# Patient Record
Sex: FEMALE | Race: WHITE | NOT HISPANIC OR LATINO | URBAN - METROPOLITAN AREA
[De-identification: names, ages, dates, MRNs, and addresses within clinical notes are randomized per-mention and may not be internally consistent; named-entity substitution may affect disease eponyms.]

---

## 2024-06-07 ENCOUNTER — HOSPITAL ENCOUNTER (INPATIENT)
Facility: HOSPITAL | Age: 59
LOS: 3 days | Discharge: HOME | DRG: 661 | End: 2024-06-10
Attending: STUDENT IN AN ORGANIZED HEALTH CARE EDUCATION/TRAINING PROGRAM | Admitting: INTERNAL MEDICINE
Payer: MEDICAID

## 2024-06-07 ENCOUNTER — ANESTHESIA (OUTPATIENT)
Dept: SURGERY | Facility: HOSPITAL | Age: 59
End: 2024-06-07
Payer: MEDICAID

## 2024-06-07 ENCOUNTER — ANESTHESIA EVENT (OUTPATIENT)
Dept: SURGERY | Facility: HOSPITAL | Age: 59
End: 2024-06-07
Payer: MEDICAID

## 2024-06-07 DIAGNOSIS — N20.1 LEFT URETERAL STONE: ICD-10-CM

## 2024-06-07 DIAGNOSIS — T83.511A URINARY TRACT INFECTION ASSOCIATED WITH CATHETERIZATION OF URINARY TRACT, UNSPECIFIED INDWELLING URINARY CATHETER TYPE, INITIAL ENCOUNTER: Primary | ICD-10-CM

## 2024-06-07 DIAGNOSIS — N39.0 URINARY TRACT INFECTION ASSOCIATED WITH CATHETERIZATION OF URINARY TRACT, UNSPECIFIED INDWELLING URINARY CATHETER TYPE, INITIAL ENCOUNTER: Primary | ICD-10-CM

## 2024-06-07 DIAGNOSIS — N20.1 CALCULUS OF URETER: ICD-10-CM

## 2024-06-07 DIAGNOSIS — N20.1 LEFT URETERAL STONE: Primary | ICD-10-CM

## 2024-06-07 DIAGNOSIS — R07.9 CHEST PAIN: ICD-10-CM

## 2024-06-07 PROBLEM — N13.30 HYDRONEPHROSIS: Status: ACTIVE | Noted: 2024-06-07

## 2024-06-07 PROBLEM — N20.0 NEPHROLITHIASIS: Status: ACTIVE | Noted: 2024-06-07

## 2024-06-07 PROBLEM — E11.9 TYPE 2 DIABETES MELLITUS, WITHOUT LONG-TERM CURRENT USE OF INSULIN: Status: ACTIVE | Noted: 2024-06-07

## 2024-06-07 PROBLEM — N20.0 NEPHROLITHIASIS: Chronic | Status: ACTIVE | Noted: 2024-06-07

## 2024-06-07 PROBLEM — D35.00 ADRENAL ADENOMA: Status: ACTIVE | Noted: 2024-06-07

## 2024-06-07 LAB
ALBUMIN SERPL BCP-MCNC: 3.3 G/DL (ref 3.5–5.2)
ALP SERPL-CCNC: 96 U/L (ref 55–135)
ALT SERPL W/O P-5'-P-CCNC: 12 U/L (ref 10–44)
ANION GAP SERPL CALC-SCNC: 14 MMOL/L (ref 8–16)
AST SERPL-CCNC: 17 U/L (ref 10–40)
BACTERIA #/AREA URNS HPF: ABNORMAL /HPF
BACTERIA #/AREA URNS HPF: ABNORMAL /HPF
BASOPHILS # BLD AUTO: 0.06 K/UL (ref 0–0.2)
BASOPHILS NFR BLD: 0.3 % (ref 0–1.9)
BILIRUB SERPL-MCNC: 0.3 MG/DL (ref 0.1–1)
BILIRUB UR QL STRIP: NEGATIVE
BILIRUB UR QL STRIP: NEGATIVE
BUN SERPL-MCNC: 6 MG/DL (ref 6–20)
CALCIUM SERPL-MCNC: 9.3 MG/DL (ref 8.7–10.5)
CHLORIDE SERPL-SCNC: 107 MMOL/L (ref 95–110)
CLARITY UR: ABNORMAL
CLARITY UR: ABNORMAL
CO2 SERPL-SCNC: 19 MMOL/L (ref 23–29)
COLOR UR: YELLOW
COLOR UR: YELLOW
CREAT SERPL-MCNC: 1 MG/DL (ref 0.5–1.4)
DIFFERENTIAL METHOD BLD: ABNORMAL
EOSINOPHIL # BLD AUTO: 0.1 K/UL (ref 0–0.5)
EOSINOPHIL NFR BLD: 0.3 % (ref 0–8)
ERYTHROCYTE [DISTWIDTH] IN BLOOD BY AUTOMATED COUNT: 14.9 % (ref 11.5–14.5)
EST. GFR  (NO RACE VARIABLE): >60 ML/MIN/1.73 M^2
GLUCOSE SERPL-MCNC: 157 MG/DL (ref 70–110)
GLUCOSE UR QL STRIP: ABNORMAL
GLUCOSE UR QL STRIP: NEGATIVE
HCT VFR BLD AUTO: 39.5 % (ref 37–48.5)
HGB BLD-MCNC: 13.5 G/DL (ref 12–16)
HGB UR QL STRIP: ABNORMAL
HGB UR QL STRIP: ABNORMAL
HYALINE CASTS #/AREA URNS LPF: 0 /LPF
HYALINE CASTS #/AREA URNS LPF: 0 /LPF
IMM GRANULOCYTES # BLD AUTO: 0.06 K/UL (ref 0–0.04)
IMM GRANULOCYTES NFR BLD AUTO: 0.3 % (ref 0–0.5)
KETONES UR QL STRIP: ABNORMAL
KETONES UR QL STRIP: NEGATIVE
LEUKOCYTE ESTERASE UR QL STRIP: ABNORMAL
LEUKOCYTE ESTERASE UR QL STRIP: ABNORMAL
LIPASE SERPL-CCNC: 24 U/L (ref 4–60)
LYMPHOCYTES # BLD AUTO: 1.1 K/UL (ref 1–4.8)
LYMPHOCYTES NFR BLD: 6.2 % (ref 18–48)
MCH RBC QN AUTO: 28.7 PG (ref 27–31)
MCHC RBC AUTO-ENTMCNC: 34.2 G/DL (ref 32–36)
MCV RBC AUTO: 84 FL (ref 82–98)
MICROSCOPIC COMMENT: ABNORMAL
MICROSCOPIC COMMENT: ABNORMAL
MONOCYTES # BLD AUTO: 0.9 K/UL (ref 0.3–1)
MONOCYTES NFR BLD: 5 % (ref 4–15)
NEUTROPHILS # BLD AUTO: 15.4 K/UL (ref 1.8–7.7)
NEUTROPHILS NFR BLD: 87.9 % (ref 38–73)
NITRITE UR QL STRIP: POSITIVE
NITRITE UR QL STRIP: POSITIVE
NRBC BLD-RTO: 0 /100 WBC
PH UR STRIP: 6 [PH] (ref 5–8)
PH UR STRIP: 6 [PH] (ref 5–8)
PLATELET # BLD AUTO: 289 K/UL (ref 150–450)
PMV BLD AUTO: 10.2 FL (ref 9.2–12.9)
POCT GLUCOSE: 110 MG/DL (ref 70–110)
POCT GLUCOSE: 94 MG/DL (ref 70–110)
POTASSIUM SERPL-SCNC: 3.6 MMOL/L (ref 3.5–5.1)
PROT SERPL-MCNC: 7.3 G/DL (ref 6–8.4)
PROT UR QL STRIP: ABNORMAL
PROT UR QL STRIP: ABNORMAL
RBC # BLD AUTO: 4.71 M/UL (ref 4–5.4)
RBC #/AREA URNS HPF: 30 /HPF (ref 0–4)
RBC #/AREA URNS HPF: 43 /HPF (ref 0–4)
SODIUM SERPL-SCNC: 140 MMOL/L (ref 136–145)
SP GR UR STRIP: 1.02 (ref 1–1.03)
SP GR UR STRIP: 1.02 (ref 1–1.03)
SQUAMOUS #/AREA URNS HPF: 10 /HPF
UNIDENT CRYS URNS QL MICRO: 3
URN SPEC COLLECT METH UR: ABNORMAL
URN SPEC COLLECT METH UR: ABNORMAL
UROBILINOGEN UR STRIP-ACNC: NEGATIVE EU/DL
UROBILINOGEN UR STRIP-ACNC: NEGATIVE EU/DL
WBC # BLD AUTO: 17.5 K/UL (ref 3.9–12.7)
WBC #/AREA URNS HPF: >100 /HPF (ref 0–5)
WBC #/AREA URNS HPF: >100 /HPF (ref 0–5)
WBC CLUMPS URNS QL MICRO: ABNORMAL
WBC CLUMPS URNS QL MICRO: ABNORMAL

## 2024-06-07 PROCEDURE — 25500020 PHARM REV CODE 255: Performed by: UROLOGY

## 2024-06-07 PROCEDURE — 80053 COMPREHEN METABOLIC PANEL: CPT | Performed by: STUDENT IN AN ORGANIZED HEALTH CARE EDUCATION/TRAINING PROGRAM

## 2024-06-07 PROCEDURE — 85025 COMPLETE CBC W/AUTO DIFF WBC: CPT | Performed by: STUDENT IN AN ORGANIZED HEALTH CARE EDUCATION/TRAINING PROGRAM

## 2024-06-07 PROCEDURE — 94761 N-INVAS EAR/PLS OXIMETRY MLT: CPT

## 2024-06-07 PROCEDURE — 37000009 HC ANESTHESIA EA ADD 15 MINS: Performed by: UROLOGY

## 2024-06-07 PROCEDURE — 71000039 HC RECOVERY, EACH ADD'L HOUR: Performed by: UROLOGY

## 2024-06-07 PROCEDURE — 25000003 PHARM REV CODE 250: Performed by: INTERNAL MEDICINE

## 2024-06-07 PROCEDURE — 71000033 HC RECOVERY, INTIAL HOUR: Performed by: UROLOGY

## 2024-06-07 PROCEDURE — 36000706: Performed by: UROLOGY

## 2024-06-07 PROCEDURE — 96365 THER/PROPH/DIAG IV INF INIT: CPT

## 2024-06-07 PROCEDURE — 25000003 PHARM REV CODE 250: Performed by: UROLOGY

## 2024-06-07 PROCEDURE — C2617 STENT, NON-COR, TEM W/O DEL: HCPCS | Performed by: UROLOGY

## 2024-06-07 PROCEDURE — C1769 GUIDE WIRE: HCPCS | Performed by: UROLOGY

## 2024-06-07 PROCEDURE — 25000003 PHARM REV CODE 250: Performed by: STUDENT IN AN ORGANIZED HEALTH CARE EDUCATION/TRAINING PROGRAM

## 2024-06-07 PROCEDURE — 63600175 PHARM REV CODE 636 W HCPCS: Performed by: STUDENT IN AN ORGANIZED HEALTH CARE EDUCATION/TRAINING PROGRAM

## 2024-06-07 PROCEDURE — 81000 URINALYSIS NONAUTO W/SCOPE: CPT | Performed by: STUDENT IN AN ORGANIZED HEALTH CARE EDUCATION/TRAINING PROGRAM

## 2024-06-07 PROCEDURE — 0T778DZ DILATION OF LEFT URETER WITH INTRALUMINAL DEVICE, VIA NATURAL OR ARTIFICIAL OPENING ENDOSCOPIC: ICD-10-PCS | Performed by: UROLOGY

## 2024-06-07 PROCEDURE — 52332 CYSTOSCOPY AND TREATMENT: CPT | Mod: LT,,, | Performed by: UROLOGY

## 2024-06-07 PROCEDURE — C1758 CATHETER, URETERAL: HCPCS | Performed by: UROLOGY

## 2024-06-07 PROCEDURE — 87184 SC STD DISK METHOD PER PLATE: CPT | Performed by: STUDENT IN AN ORGANIZED HEALTH CARE EDUCATION/TRAINING PROGRAM

## 2024-06-07 PROCEDURE — 36000707: Performed by: UROLOGY

## 2024-06-07 PROCEDURE — 83690 ASSAY OF LIPASE: CPT | Performed by: STUDENT IN AN ORGANIZED HEALTH CARE EDUCATION/TRAINING PROGRAM

## 2024-06-07 PROCEDURE — 87186 SC STD MICRODIL/AGAR DIL: CPT | Performed by: STUDENT IN AN ORGANIZED HEALTH CARE EDUCATION/TRAINING PROGRAM

## 2024-06-07 PROCEDURE — 87086 URINE CULTURE/COLONY COUNT: CPT | Mod: 59 | Performed by: UROLOGY

## 2024-06-07 PROCEDURE — 37000008 HC ANESTHESIA 1ST 15 MINUTES: Performed by: UROLOGY

## 2024-06-07 PROCEDURE — 87077 CULTURE AEROBIC IDENTIFY: CPT | Performed by: STUDENT IN AN ORGANIZED HEALTH CARE EDUCATION/TRAINING PROGRAM

## 2024-06-07 PROCEDURE — 81000 URINALYSIS NONAUTO W/SCOPE: CPT | Mod: 91 | Performed by: UROLOGY

## 2024-06-07 PROCEDURE — 63600175 PHARM REV CODE 636 W HCPCS: Performed by: UROLOGY

## 2024-06-07 PROCEDURE — 11000001 HC ACUTE MED/SURG PRIVATE ROOM

## 2024-06-07 PROCEDURE — 87088 URINE BACTERIA CULTURE: CPT | Performed by: STUDENT IN AN ORGANIZED HEALTH CARE EDUCATION/TRAINING PROGRAM

## 2024-06-07 PROCEDURE — BT1F1ZZ FLUOROSCOPY OF LEFT KIDNEY, URETER AND BLADDER USING LOW OSMOLAR CONTRAST: ICD-10-PCS | Performed by: UROLOGY

## 2024-06-07 PROCEDURE — 63600175 PHARM REV CODE 636 W HCPCS: Performed by: ANESTHESIOLOGY

## 2024-06-07 PROCEDURE — 99291 CRITICAL CARE FIRST HOUR: CPT

## 2024-06-07 PROCEDURE — 87086 URINE CULTURE/COLONY COUNT: CPT | Performed by: STUDENT IN AN ORGANIZED HEALTH CARE EDUCATION/TRAINING PROGRAM

## 2024-06-07 PROCEDURE — 25000003 PHARM REV CODE 250: Performed by: ANESTHESIOLOGY

## 2024-06-07 PROCEDURE — 99222 1ST HOSP IP/OBS MODERATE 55: CPT | Mod: 25,,, | Performed by: UROLOGY

## 2024-06-07 PROCEDURE — 96375 TX/PRO/DX INJ NEW DRUG ADDON: CPT

## 2024-06-07 PROCEDURE — 74420 UROGRAPHY RTRGR +-KUB: CPT | Mod: 26,,, | Performed by: UROLOGY

## 2024-06-07 DEVICE — STENT URETERAL UNIV 6FR 26CM: Type: IMPLANTABLE DEVICE | Site: URETER | Status: FUNCTIONAL

## 2024-06-07 RX ORDER — GLUCAGON 1 MG
1 KIT INJECTION
Status: DISCONTINUED | OUTPATIENT
Start: 2024-06-07 | End: 2024-06-10 | Stop reason: HOSPADM

## 2024-06-07 RX ORDER — SODIUM CHLORIDE 0.9 % (FLUSH) 0.9 %
10 SYRINGE (ML) INJECTION EVERY 12 HOURS PRN
Status: DISCONTINUED | OUTPATIENT
Start: 2024-06-07 | End: 2024-06-10 | Stop reason: HOSPADM

## 2024-06-07 RX ORDER — ALUMINUM HYDROXIDE, MAGNESIUM HYDROXIDE, AND SIMETHICONE 1200; 120; 1200 MG/30ML; MG/30ML; MG/30ML
30 SUSPENSION ORAL
Status: DISCONTINUED | OUTPATIENT
Start: 2024-06-07 | End: 2024-06-10 | Stop reason: HOSPADM

## 2024-06-07 RX ORDER — MIDAZOLAM HYDROCHLORIDE 1 MG/ML
INJECTION INTRAMUSCULAR; INTRAVENOUS
Status: DISCONTINUED | OUTPATIENT
Start: 2024-06-07 | End: 2024-06-07

## 2024-06-07 RX ORDER — ACETAMINOPHEN 325 MG/1
650 TABLET ORAL EVERY 8 HOURS PRN
Status: DISCONTINUED | OUTPATIENT
Start: 2024-06-07 | End: 2024-06-10 | Stop reason: HOSPADM

## 2024-06-07 RX ORDER — IBUPROFEN 200 MG
16 TABLET ORAL
Status: DISCONTINUED | OUTPATIENT
Start: 2024-06-07 | End: 2024-06-10 | Stop reason: HOSPADM

## 2024-06-07 RX ORDER — MORPHINE SULFATE 4 MG/ML
4 INJECTION, SOLUTION INTRAMUSCULAR; INTRAVENOUS
Status: COMPLETED | OUTPATIENT
Start: 2024-06-07 | End: 2024-06-07

## 2024-06-07 RX ORDER — ONDANSETRON HYDROCHLORIDE 2 MG/ML
4 INJECTION, SOLUTION INTRAVENOUS DAILY PRN
Status: DISCONTINUED | OUTPATIENT
Start: 2024-06-07 | End: 2024-06-07 | Stop reason: HOSPADM

## 2024-06-07 RX ORDER — OXYBUTYNIN CHLORIDE 5 MG/1
5 TABLET ORAL 3 TIMES DAILY PRN
Status: DISCONTINUED | OUTPATIENT
Start: 2024-06-07 | End: 2024-06-07 | Stop reason: HOSPADM

## 2024-06-07 RX ORDER — NALOXONE HCL 0.4 MG/ML
0.02 VIAL (ML) INJECTION
Status: DISCONTINUED | OUTPATIENT
Start: 2024-06-07 | End: 2024-06-10 | Stop reason: HOSPADM

## 2024-06-07 RX ORDER — OXYBUTYNIN CHLORIDE 5 MG/1
5 TABLET ORAL 3 TIMES DAILY PRN
Status: DISCONTINUED | OUTPATIENT
Start: 2024-06-07 | End: 2024-06-07

## 2024-06-07 RX ORDER — PHENAZOPYRIDINE HYDROCHLORIDE 100 MG/1
100 TABLET, FILM COATED ORAL 3 TIMES DAILY PRN
Status: DISCONTINUED | OUTPATIENT
Start: 2024-06-07 | End: 2024-06-07

## 2024-06-07 RX ORDER — IBUPROFEN 200 MG
24 TABLET ORAL
Status: DISCONTINUED | OUTPATIENT
Start: 2024-06-07 | End: 2024-06-10 | Stop reason: HOSPADM

## 2024-06-07 RX ORDER — ONDANSETRON HYDROCHLORIDE 2 MG/ML
4 INJECTION, SOLUTION INTRAVENOUS
Status: COMPLETED | OUTPATIENT
Start: 2024-06-07 | End: 2024-06-07

## 2024-06-07 RX ORDER — PROPOFOL 10 MG/ML
VIAL (ML) INTRAVENOUS CONTINUOUS PRN
Status: DISCONTINUED | OUTPATIENT
Start: 2024-06-07 | End: 2024-06-07

## 2024-06-07 RX ORDER — SUCRALFATE 1 G/10ML
1 SUSPENSION ORAL EVERY 6 HOURS
Status: DISCONTINUED | OUTPATIENT
Start: 2024-06-07 | End: 2024-06-10 | Stop reason: HOSPADM

## 2024-06-07 RX ORDER — PHENAZOPYRIDINE HYDROCHLORIDE 100 MG/1
100 TABLET, FILM COATED ORAL 3 TIMES DAILY PRN
Status: DISCONTINUED | OUTPATIENT
Start: 2024-06-07 | End: 2024-06-07 | Stop reason: HOSPADM

## 2024-06-07 RX ORDER — ACETAMINOPHEN 325 MG/1
650 TABLET ORAL EVERY 4 HOURS PRN
Status: DISCONTINUED | OUTPATIENT
Start: 2024-06-07 | End: 2024-06-10 | Stop reason: HOSPADM

## 2024-06-07 RX ORDER — PROPOFOL 10 MG/ML
VIAL (ML) INTRAVENOUS
Status: DISCONTINUED | OUTPATIENT
Start: 2024-06-07 | End: 2024-06-07

## 2024-06-07 RX ORDER — ONDANSETRON HYDROCHLORIDE 2 MG/ML
4 INJECTION, SOLUTION INTRAVENOUS EVERY 6 HOURS PRN
Status: DISCONTINUED | OUTPATIENT
Start: 2024-06-07 | End: 2024-06-10 | Stop reason: HOSPADM

## 2024-06-07 RX ORDER — OXYCODONE HYDROCHLORIDE 5 MG/1
5 TABLET ORAL
Status: DISCONTINUED | OUTPATIENT
Start: 2024-06-07 | End: 2024-06-07 | Stop reason: HOSPADM

## 2024-06-07 RX ORDER — HYDROMORPHONE HYDROCHLORIDE 2 MG/ML
0.5 INJECTION, SOLUTION INTRAMUSCULAR; INTRAVENOUS; SUBCUTANEOUS EVERY 5 MIN PRN
Status: COMPLETED | OUTPATIENT
Start: 2024-06-07 | End: 2024-06-07

## 2024-06-07 RX ORDER — HYDROCODONE BITARTRATE AND ACETAMINOPHEN 5; 325 MG/1; MG/1
1 TABLET ORAL EVERY 6 HOURS PRN
Status: DISCONTINUED | OUTPATIENT
Start: 2024-06-07 | End: 2024-06-10 | Stop reason: HOSPADM

## 2024-06-07 RX ORDER — TALC
6 POWDER (GRAM) TOPICAL NIGHTLY PRN
Status: DISCONTINUED | OUTPATIENT
Start: 2024-06-07 | End: 2024-06-10 | Stop reason: HOSPADM

## 2024-06-07 RX ORDER — KETOROLAC TROMETHAMINE 30 MG/ML
15 INJECTION, SOLUTION INTRAMUSCULAR; INTRAVENOUS
Status: COMPLETED | OUTPATIENT
Start: 2024-06-07 | End: 2024-06-07

## 2024-06-07 RX ADMIN — HYDROMORPHONE HYDROCHLORIDE 0.5 MG: 2 INJECTION, SOLUTION INTRAMUSCULAR; INTRAVENOUS; SUBCUTANEOUS at 08:06

## 2024-06-07 RX ADMIN — MIDAZOLAM 2 MG: 1 INJECTION INTRAMUSCULAR; INTRAVENOUS at 07:06

## 2024-06-07 RX ADMIN — KETOROLAC TROMETHAMINE 15 MG: 30 INJECTION, SOLUTION INTRAMUSCULAR; INTRAVENOUS at 02:06

## 2024-06-07 RX ADMIN — OXYCODONE 5 MG: 5 TABLET ORAL at 08:06

## 2024-06-07 RX ADMIN — PROPOFOL 125 MCG/KG/MIN: 10 INJECTION, EMULSION INTRAVENOUS at 07:06

## 2024-06-07 RX ADMIN — ONDANSETRON 4 MG: 2 INJECTION INTRAMUSCULAR; INTRAVENOUS at 01:06

## 2024-06-07 RX ADMIN — PROPOFOL 30 MG: 10 INJECTION, EMULSION INTRAVENOUS at 07:06

## 2024-06-07 RX ADMIN — PHENAZOPYRIDINE HYDROCHLORIDE 100 MG: 100 TABLET ORAL at 08:06

## 2024-06-07 RX ADMIN — SUCRALFATE 1 G: 1 SUSPENSION ORAL at 11:06

## 2024-06-07 RX ADMIN — ALUMINUM HYDROXIDE, MAGNESIUM HYDROXIDE, AND SIMETHICONE 30 ML: 1200; 120; 1200 SUSPENSION ORAL at 11:06

## 2024-06-07 RX ADMIN — MORPHINE SULFATE 4 MG: 4 INJECTION INTRAVENOUS at 01:06

## 2024-06-07 RX ADMIN — OXYBUTYNIN CHLORIDE 5 MG: 5 TABLET ORAL at 08:06

## 2024-06-07 RX ADMIN — CEFTRIAXONE SODIUM 1 G: 1 INJECTION, POWDER, FOR SOLUTION INTRAMUSCULAR; INTRAVENOUS at 08:06

## 2024-06-07 RX ADMIN — ACETAMINOPHEN 650 MG: 325 TABLET ORAL at 07:06

## 2024-06-07 RX ADMIN — HYDROCODONE BITARTRATE AND ACETAMINOPHEN 1 TABLET: 5; 325 TABLET ORAL at 11:06

## 2024-06-07 RX ADMIN — HYDROCODONE BITARTRATE AND ACETAMINOPHEN 1 TABLET: 5; 325 TABLET ORAL at 04:06

## 2024-06-07 RX ADMIN — SUCRALFATE 1 G: 1 SUSPENSION ORAL at 05:06

## 2024-06-07 RX ADMIN — HYDROCODONE BITARTRATE AND ACETAMINOPHEN 1 TABLET: 5; 325 TABLET ORAL at 06:06

## 2024-06-07 RX ADMIN — CEFTRIAXONE SODIUM 1 G: 1 INJECTION, POWDER, FOR SOLUTION INTRAMUSCULAR; INTRAVENOUS at 03:06

## 2024-06-07 RX ADMIN — ALUMINUM HYDROXIDE, MAGNESIUM HYDROXIDE, AND SIMETHICONE 30 ML: 1200; 120; 1200 SUSPENSION ORAL at 08:06

## 2024-06-07 RX ADMIN — ALUMINUM HYDROXIDE, MAGNESIUM HYDROXIDE, AND SIMETHICONE 30 ML: 1200; 120; 1200 SUSPENSION ORAL at 04:06

## 2024-06-07 NOTE — SUBJECTIVE & OBJECTIVE
No past medical history on file.    No past surgical history on file.    Review of patient's allergies indicates:   Allergen Reactions    Sulfa (sulfonamide antibiotics)        No current facility-administered medications on file prior to encounter.     No current outpatient medications on file prior to encounter.     Family History    None       Tobacco Use    Smoking status: Not on file    Smokeless tobacco: Not on file   Substance and Sexual Activity    Alcohol use: Not on file    Drug use: Not on file    Sexual activity: Not on file     Review of Systems   Constitutional:  Negative for appetite change and chills.   HENT:  Negative for ear discharge and ear pain.    Respiratory:  Negative for cough and choking.    Cardiovascular:  Negative for chest pain and leg swelling.   Gastrointestinal:  Positive for abdominal pain. Negative for anal bleeding and blood in stool.   Endocrine: Negative for polydipsia and polyphagia.   Genitourinary:  Positive for flank pain. Negative for hematuria.   Musculoskeletal:  Negative for back pain and gait problem.   Skin:  Negative for pallor and rash.   Allergic/Immunologic: Negative for food allergies and immunocompromised state.   Neurological:  Negative for seizures and speech difficulty.   Hematological:  Negative for adenopathy. Does not bruise/bleed easily.   Psychiatric/Behavioral:  Negative for decreased concentration and dysphoric mood.      Objective:     Vital Signs (Most Recent):  Temp: 98.8 °F (37.1 °C) (06/07/24 0159)  Pulse: 81 (06/07/24 0220)  Resp: 16 (06/07/24 0136)  BP: 139/78 (06/07/24 0204)  SpO2: 96 % (06/07/24 0220) Vital Signs (24h Range):  Temp:  [98.8 °F (37.1 °C)-99.4 °F (37.4 °C)] 98.8 °F (37.1 °C)  Pulse:  [78-86] 81  Resp:  [16-18] 16  SpO2:  [95 %-98 %] 96 %  BP: (136-165)/(78-98) 139/78     Weight: 90.7 kg (200 lb)  Body mass index is 29.53 kg/m².     Physical Exam  Vitals reviewed.   Constitutional:       General: She is not in acute distress.      "Appearance: She is not toxic-appearing.   HENT:      Right Ear: There is no impacted cerumen.      Left Ear: There is no impacted cerumen.      Nose: No congestion or rhinorrhea.      Mouth/Throat:      Pharynx: No oropharyngeal exudate or posterior oropharyngeal erythema.   Eyes:      General:         Right eye: No discharge.         Left eye: No discharge.   Cardiovascular:      Rate and Rhythm: Normal rate.      Heart sounds: No murmur heard.     No gallop.   Pulmonary:      Breath sounds: No wheezing or rales.   Abdominal:      General: There is no distension.      Tenderness: There is abdominal tenderness. There is left CVA tenderness.   Musculoskeletal:         General: No swelling or deformity.      Cervical back: No rigidity.   Lymphadenopathy:      Cervical: No cervical adenopathy.   Skin:     Coloration: Skin is not jaundiced.      Findings: No bruising.   Neurological:      General: No focal deficit present.      Cranial Nerves: No cranial nerve deficit.      Motor: No weakness.   Psychiatric:         Mood and Affect: Mood normal.                Significant Labs: All pertinent labs within the past 24 hours have been reviewed.  Blood Culture: No results for input(s): "LABBLOO" in the last 48 hours.  BMP:   Recent Labs   Lab 06/07/24  0137   *      K 3.6      CO2 19*   BUN 6   CREATININE 1.0   CALCIUM 9.3     CBC:   Recent Labs   Lab 06/07/24  0137   WBC 17.50*   HGB 13.5   HCT 39.5        CMP:   Recent Labs   Lab 06/07/24 0137      K 3.6      CO2 19*   *   BUN 6   CREATININE 1.0   CALCIUM 9.3   PROT 7.3   ALBUMIN 3.3*   BILITOT 0.3   ALKPHOS 96   AST 17   ALT 12   ANIONGAP 14     Troponin: No results for input(s): "TROPONINI", "TROPONINIHS" in the last 48 hours.  Urine Culture: No results for input(s): "LABURIN" in the last 48 hours.    Significant Imaging: I have reviewed all pertinent imaging results/findings within the past 24 hours.  I have reviewed and " interpreted all pertinent imaging results/findings within the past 24 hours.

## 2024-06-07 NOTE — ASSESSMENT & PLAN NOTE
CT abdominal pelvis significant for obstructing renal stone on left side, bilateral nonobstructing stones    Plan:  Consult Urology for possible lithotripsy

## 2024-06-07 NOTE — HPI
Josselyn Tarango is a 58-year-old female with a past medical history of stomach ulcer, diabetes who presents with abdominal pain diarrhea.    Patient states that over the past few days she has had several loose nonbloody non mucous bowel movements as well as left lower abdominal pain.  Due to worsening pain she decided to call the ambulance, where she noticed that the lower abdominal pain also radiated to the back.    Triage vitals were remarkable for elevated blood pressures.  Review of systems significant for abdominal pain and diarrhea.  Physical exam significant for some tenderness to palpation in the lower left abdominal and flank pain area.    CBC significant for leukocytosis.  CMP significant for elevated sugars.    UA significant for UTI, with several WBCs, nitrates, leukocyte esterase.  Urine culture pending.    CT abdomen and pelvis was concerning for obstructing calculus in the distal left ureter with moderate hydronephrosis, bilateral nonobstructing calculi, left adrenal adenoma, diverticulosis.    Patient was given dose of IV Rocephin.    Patient admitted for UTI, obstructing nephrolithiasis, and hydronephrosis management.  Urology consultation in the a.m.

## 2024-06-07 NOTE — ED NOTES
PT AMB TO BATHROOM AND BACK WITHOUT COMPLICATION.  VSS PER MONITOR.  NO NEEDS VOICED, COVERED WITH WARM BLANKET X2, CALL LIGHT IN REACH.  WILL CONT TO MONITOR

## 2024-06-07 NOTE — PLAN OF CARE
Pacu discharge criteria met. Report called to Migel Veronica/5A. Transported to room 522 via stretcher,sr upx2 in care of transport staff.

## 2024-06-07 NOTE — ED NOTES
Pt c/o left side/low abd pain.  Medicated with hydrocodone.  Will monitor for effect. Call light in hand

## 2024-06-07 NOTE — H&P
[facility]  History & Physical    Patient Name: Josselyn Tarango  MRN: [id number]  Patient Class: OP- Observation  Admission Date: 6/7/2024  Attending Physician: Edison Echevarria MD   Primary Care Provider: No primary care provider on file.         Patient information was obtained from patient and ER records.     Subjective:     Principal Problem:UTI (urinary tract infection)    Chief Complaint:   Chief Complaint   Patient presents with    Abdominal Pain     Brought to ED from home for abd pain and diarrhea x 2 days. Pt also c/o of trouble urinating but states that this is chronic and unchanged since 2yrs of age.         HPI: Josselyn Tarango is a 58-year-old female with a past medical history of stomach ulcer, diabetes who presents with abdominal pain diarrhea.    Patient states that over the past few days she has had several loose nonbloody non mucous bowel movements as well as left lower abdominal pain.  Due to worsening pain she decided to call the ambulance, where she noticed that the lower abdominal pain also radiated to the back.    Triage vitals were remarkable for elevated blood pressures.  Review of systems significant for abdominal pain and diarrhea.  Physical exam significant for some tenderness to palpation in the lower left abdominal and flank pain area.    CBC significant for leukocytosis.  CMP significant for elevated sugars.    UA significant for UTI, with several WBCs, nitrates, leukocyte esterase.  Urine culture pending.    CT abdomen and pelvis was concerning for obstructing calculus in the distal left ureter with moderate hydronephrosis, bilateral nonobstructing calculi, left adrenal adenoma, diverticulosis.    Patient was given dose of IV Rocephin.    Patient admitted for UTI, obstructing nephrolithiasis, and hydronephrosis management.  Urology consultation in the a.m.    No past medical history on file.    No past surgical history on file.    Review of  patient's allergies indicates:   Allergen Reactions    Sulfa (sulfonamide antibiotics)        No current facility-administered medications on file prior to encounter.     No current outpatient medications on file prior to encounter.     Family History    None       Tobacco Use    Smoking status: Not on file    Smokeless tobacco: Not on file   Substance and Sexual Activity    Alcohol use: Not on file    Drug use: Not on file    Sexual activity: Not on file     Review of Systems   Constitutional:  Negative for appetite change and chills.   HENT:  Negative for ear discharge and ear pain.    Respiratory:  Negative for cough and choking.    Cardiovascular:  Negative for chest pain and leg swelling.   Gastrointestinal:  Positive for abdominal pain. Negative for anal bleeding and blood in stool.   Endocrine: Negative for polydipsia and polyphagia.   Genitourinary:  Positive for flank pain. Negative for hematuria.   Musculoskeletal:  Negative for back pain and gait problem.   Skin:  Negative for pallor and rash.   Allergic/Immunologic: Negative for food allergies and immunocompromised state.   Neurological:  Negative for seizures and speech difficulty.   Hematological:  Negative for adenopathy. Does not bruise/bleed easily.   Psychiatric/Behavioral:  Negative for decreased concentration and dysphoric mood.      Objective:     Vital Signs (Most Recent):  Temp: 98.8 °F (37.1 °C) (06/07/24 0159)  Pulse: 81 (06/07/24 0220)  Resp: 16 (06/07/24 0136)  BP: 139/78 (06/07/24 0204)  SpO2: 96 % (06/07/24 0220) Vital Signs (24h Range):  Temp:  [98.8 °F (37.1 °C)-99.4 °F (37.4 °C)] 98.8 °F (37.1 °C)  Pulse:  [78-86] 81  Resp:  [16-18] 16  SpO2:  [95 %-98 %] 96 %  BP: (136-165)/(78-98) 139/78     Weight: 90.7 kg (200 lb)  Body mass index is 29.53 kg/m².     Physical Exam  Vitals reviewed.   Constitutional:       General: She is not in acute distress.     Appearance: She is not toxic-appearing.   HENT:      Right Ear: There is no impacted  "cerumen.      Left Ear: There is no impacted cerumen.      Nose: No congestion or rhinorrhea.      Mouth/Throat:      Pharynx: No oropharyngeal exudate or posterior oropharyngeal erythema.   Eyes:      General:         Right eye: No discharge.         Left eye: No discharge.   Cardiovascular:      Rate and Rhythm: Normal rate.      Heart sounds: No murmur heard.     No gallop.   Pulmonary:      Breath sounds: No wheezing or rales.   Abdominal:      General: There is no distension.      Tenderness: There is abdominal tenderness. There is left CVA tenderness.   Musculoskeletal:         General: No swelling or deformity.      Cervical back: No rigidity.   Lymphadenopathy:      Cervical: No cervical adenopathy.   Skin:     Coloration: Skin is not jaundiced.      Findings: No bruising.   Neurological:      General: No focal deficit present.      Cranial Nerves: No cranial nerve deficit.      Motor: No weakness.   Psychiatric:         Mood and Affect: Mood normal.                Significant Labs: All pertinent labs within the past 24 hours have been reviewed.  Blood Culture: No results for input(s): "LABBLOO" in the last 48 hours.  BMP:   Recent Labs   Lab 06/07/24  0137   *      K 3.6      CO2 19*   BUN 6   CREATININE 1.0   CALCIUM 9.3     CBC:   Recent Labs   Lab 06/07/24 0137   WBC 17.50*   HGB 13.5   HCT 39.5        CMP:   Recent Labs   Lab 06/07/24 0137      K 3.6      CO2 19*   *   BUN 6   CREATININE 1.0   CALCIUM 9.3   PROT 7.3   ALBUMIN 3.3*   BILITOT 0.3   ALKPHOS 96   AST 17   ALT 12   ANIONGAP 14     Troponin: No results for input(s): "TROPONINI", "TROPONINIHS" in the last 48 hours.  Urine Culture: No results for input(s): "LABURIN" in the last 48 hours.    Significant Imaging: I have reviewed all pertinent imaging results/findings within the past 24 hours.  I have reviewed and interpreted all pertinent imaging results/findings within the past 24 " hours.  Assessment/Plan:     * UTI (urinary tract infection)  UA significant for UTI    Plan:  Urine culture pending  IV ceftriaxone daily for 3-5 day total course, transition to oral agents when sensitivities arrive      Adrenal adenoma  Incidental finding on CT scan  Likely benign    Plan:  Continue to monitor outpatient      Nephrolithiasis  CT abdominal pelvis significant for obstructing renal stone on left side, bilateral nonobstructing stones    Plan:  Consult Urology for possible lithotripsy    Type 2 diabetes mellitus, without long-term current use of insulin  Patient states that she was diagnosed with diabetes over the past year with a provider in [place]    Plan:  Order hemoglobin A1c  Consider sliding scale if blood glucose is greatly above 180      Hydronephrosis  Left-sided hydronephrosis noted    Plan:  Consult Urology for possible PCN due to obstructing stone        VTE Risk Mitigation (From admission, onward)           Ordered     IP VTE LOW RISK PATIENT  Once         06/07/24 0459     Place sequential compression device  Until discontinued         06/07/24 0459                       On 06/07/2024, patient should be placed in hospital observation services under my care.             Steven Encarnacion MD  Department of Hospital Medicine  [facility] - Emergency Dept

## 2024-06-07 NOTE — ED PROVIDER NOTES
"ED Provider Note - 6/7/2024    History     Chief Complaint   Patient presents with    Abdominal Pain     Brought to ED from home for abd pain and diarrhea x 2 days. Pt also c/o of trouble urinating but states that this is chronic and unchanged since 2yrs of age.        HPI     Josselyn Tarango is a 58 y.o. year old female with past medical and surgical history as seen below, presenting with chief complaint of left lower quadrant abdominal pain.  Associated with nausea, vomiting, diarrhea.  Started today.  States history of appendicitis with appendectomy several years ago.      No past medical history on file.  No past surgical history on file.      No family history on file.     Social Determinants of Health with Concerns     Tobacco Use: Not on file   Alcohol Use: Not on file   Financial Resource Strain: Not on file   Food Insecurity: Not on file   Transportation Needs: Not on file   Physical Activity: Not on file   Stress: Not on file   Housing Stability: Not on file   Depression: Not on file   Utilities: Not on file   Health Literacy: Not on file   Social Isolation: Not on file      Review of patient's allergies indicates:   Allergen Reactions    Sulfa (sulfonamide antibiotics)        Review of Systems     A full Review of Systems (ROS) was performed and was negative unless otherwise stated in the HPI.      Physical Exam     Vitals:    06/07/24 0034   BP: (!) 165/98   BP Location: Right arm   Patient Position: Sitting   Pulse: 78   Resp: 18   Temp: 99.4 °F (37.4 °C)   TempSrc: Oral   SpO2: 98%   Weight: 90.7 kg (200 lb)   Height: 5' 9" (1.753 m)        Physical Exam    Nursing note and vitals reviewed.  Constitutional: She appears well-developed and well-nourished. No distress.   HENT:   Head: Normocephalic and atraumatic.   Right Ear: External ear normal.   Left Ear: External ear normal.   Nose: Nose normal.   Mouth/Throat: Oropharynx is clear and moist.   Eyes: Conjunctivae and EOM are normal. Pupils are equal, " round, and reactive to light.   Neck: Neck supple.   Normal range of motion.  Cardiovascular:  Normal rate, regular rhythm, normal heart sounds and intact distal pulses.           Pulmonary/Chest: Breath sounds normal. No stridor. No respiratory distress. She has no wheezes. She has no rhonchi. She has no rales.   Abdominal: Abdomen is soft. Bowel sounds are normal. There is abdominal tenderness in the left lower quadrant. No hernia.   Musculoskeletal:         General: No tenderness or edema. Normal range of motion.      Cervical back: Normal range of motion and neck supple.     Neurological: She is alert and oriented to person, place, and time. She has normal strength. No cranial nerve deficit or sensory deficit.   Skin: Skin is warm and dry. No rash noted.   Psychiatric: She has a normal mood and affect. Thought content normal.         Lab Results- Independently reviewed by myself      Labs Reviewed   CBC W/ AUTO DIFFERENTIAL - Abnormal; Notable for the following components:       Result Value    WBC 17.50 (*)     RDW 14.9 (*)     Gran # (ANC) 15.4 (*)     Immature Grans (Abs) 0.06 (*)     Gran % 87.9 (*)     Lymph % 6.2 (*)     All other components within normal limits   COMPREHENSIVE METABOLIC PANEL - Abnormal; Notable for the following components:    CO2 19 (*)     Glucose 157 (*)     Albumin 3.3 (*)     All other components within normal limits   URINALYSIS, REFLEX TO URINE CULTURE - Abnormal; Notable for the following components:    Appearance, UA Cloudy (*)     Protein, UA 2+ (*)     Ketones, UA Trace (*)     Occult Blood UA 2+ (*)     Nitrite, UA Positive (*)     Leukocytes, UA 3+ (*)     All other components within normal limits    Narrative:     Specimen Source->Urine   URINALYSIS MICROSCOPIC - Abnormal; Notable for the following components:    RBC, UA 43 (*)     WBC, UA >100 (*)     WBC Clumps, UA Moderate (*)     All other components within normal limits    Narrative:     Specimen Source->Urine    CULTURE, URINE   LIPASE           Imaging     Imaging Results              SURG [place] Surgery Retrograde Pyelogram (Final result)  Result time 06/07/24 09:38:29      Final result by Access, Silent  (06/07/24 09:38:29)                   Narrative:    See OP Notes for results.     IMPRESSION: See OP Notes for results.             This procedure was auto-finalized by: Virtual Radiologist                                      CT Abdomen Pelvis  Without Contrast (Final result)  Result time 06/07/24 01:53:25      Final result by Gini Gayle MD (06/07/24 01:53:25)                   Impression:      7 x 4.3 cm obstructing calculus in the distal left ureter with moderate hydronephrosis.    Nonobstructing calculi also noted bilaterally as well as a simple cyst on the right.    Left adrenal adenoma.    Moderate aortic atherosclerosis.  No aneurysm.    Diverticulosis without diverticulitis.    Additional incidental nonacute findings as above.    This report was flagged in Epic as abnormal.      Electronically signed by: Gini Gayle  Date:    06/07/2024  Time:    01:53               Narrative:    EXAMINATION:  CT ABDOMEN PELVIS WITHOUT CONTRAST    CLINICAL HISTORY:  LLQ abdominal pain;Nausea/vomiting;    TECHNIQUE:  Low dose axial images, sagittal and coronal reformations were obtained from the lung bases to the pubic symphysis, Oral contrast was not administered.    COMPARISON:  None    FINDINGS:  Heart: Normal in size. No pericardial effusion.    Lung Bases: Well aerated, without consolidation or pleural fluid.    Liver: Normal in size and attenuation, with no focal hepatic lesions.    Gallbladder: No calcified gallstones.  No CT evidence of cholecystitis.    Bile Ducts: No evidence of dilated ducts.    Pancreas: No mass or peripancreatic fat stranding.    Spleen: Unremarkable.    Adrenals: There is a 4.4 x 3.5 by 3.2 cm left adrenal fatty mass compatible with adenoma.  Right adrenal gland is  unremarkable..    Kidneys/ Ureters: There is a 7 by 4 0.3 mm calculus in the distal left ureter with moderate ureterectasis and hydronephrosis on the left.  A nonobstructing calculus also noted in the left kidney measuring 9 x 3 mm in the midpole.  There is a punctate nonobstructing calculus in the right kidney lower pole.  No other calculi are seen bilaterally.  Vascular calcifications bilaterally.  There is 5 cm simple cyst in the right kidney.    Bladder: No evidence of wall thickening.    Reproductive organs: Patient is post hysterectomy..  No adnexal mass.    GI Tract/Mesentery: There is diverticulosis of the distal colon.  No evidence diverticulitis.  No evidence of bowel obstruction or inflammation.  Sutures around the cecum suggest appendectomy.    Peritoneal Space: No ascites. No free air.    Retroperitoneum: No significant adenopathy.    Abdominal wall: Left lower quadrant fat containing hernia..    Vasculature: There is moderate aortic atherosclerosis.  There is mild ectasia measuring up to 2.2 cm in diameter along the infrarenal aorta.  No significant aneurysm.    Bones: No acute fracture.  Spine appears aligned.                                    X-Rays:   Independently Interpreted Readings:   Abdomen:   Renal Stone Study - Kidney(s): Hydronephrosis present - left ureter(s). Stone(s) present - left ureter(s).  Cyst(s) present - right kidney(s).  Incidental findings of CT scan were discussed with the patient.               ED Course         Critical Care    Date/Time: 6/8/2024 3:37 AM    Performed by: Yossi Pollock MD  Authorized by: Yossi Pollock MD  Direct patient critical care time: 11 minutes  Additional history critical care time: 7 minutes  Ordering / reviewing critical care time: 7 minutes  Documentation critical care time: 8 minutes  Consulting other physicians critical care time: 5 minutes  Total critical care time (exclusive of procedural time) : 38 minutes  Critical care time was exclusive  of separately billable procedures and treating other patients and teaching time.  Critical care was necessary to treat or prevent imminent or life-threatening deterioration of the following conditions: infected and obstructing ureteral stone.  Critical care was time spent personally by me on the following activities: blood draw for specimens, discussions with consultants, interpretation of cardiac output measurements, evaluation of patient's response to treatment, examination of patient, obtaining history from patient or surrogate, ordering and review of laboratory studies, ordering and performing treatments and interventions, ordering and review of radiographic studies, pulse oximetry, re-evaluation of patient's condition and review of old charts.               Orders Placed This Encounter    CT Abdomen Pelvis  Without Contrast    CBC W/ AUTO DIFFERENTIAL    Comp. Metabolic Panel    Lipase    Urinalysis, Reflex to Urine Culture Urine, Clean Catch    Diet NPO    Vital signs    Insert peripheral IV    morphine injection 4 mg    ondansetron injection 4 mg                      Medical Decision Making       The patient's list of active medical problems, social history, medications, and allergies as documented per RN staff has been reviewed.           Medical Decision Making  58-year-old female with left-sided abdomen pain.  CT scan showing large urolith associated with urinalysis that appears infected.  Given findings concerning for obstructed and infected urolith, have discussed case with Hospital Medicine who will continue patient's evaluation and management.    Problems Addressed:  Calculus of ureter: acute illness or injury    Amount and/or Complexity of Data Reviewed  Labs: ordered.     Details: Leukocytosis on CBC with positive nitrites and leukocytes on urinalysis.  Radiology: ordered and independent interpretation performed.    Risk  Prescription drug management.  Decision regarding hospitalization.          Additional MDM:   Differential Diagnosis:   Symptom: Abdominal pain. <> The follow diagnoses were considered and will be evaluated: Cystitis, Diverticulitis, Gastroenteritis, Gastroparesis, Mesenteric Adenitis, Ovarian Cyst, Peritonitis, Psoas Abscess, Pyelonephritis, Ureteral Calculus and Uterine Fibroids.                  Clinical Impression         Disposition   ED Disposition Condition    Observation Stable            Diagnosis    ICD-10-CM ICD-9-CM   1. Urinary tract infection associated with catheterization of urinary tract, unspecified indwelling urinary catheter type, initial encounter  T83.511A 996.64    N39.0 599.0   2. Calculus of ureter  N20.1 592.1   3. Chest pain  R07.9 786.50   4. Left ureteral stone  N20.1 592.1           Yossi Pollock MD        06/07/2024          DISCLAIMER: This note was prepared with [employer] voice recognition transcription software. Garbled syntax, mangled pronouns, and other bizarre constructions may be attributed to that software system.       Yossi Pollock MD  06/08/24 5232

## 2024-06-07 NOTE — TRANSFER OF CARE
"Anesthesia Transfer of Care Note    Patient: Josselyn Tarango    Procedure(s) Performed: Procedure(s) (LRB):  CYSTOSCOPY, left retrograde pyelogram, left JJ stent (Left)  PYELOGRAM, RETROGRADE (N/A)    Patient location: PACU    Anesthesia Type: general    Transport from OR: Transported from OR on 6-10 L/min O2 by face mask with adequate spontaneous ventilation    Post pain: adequate analgesia    Post assessment: no apparent anesthetic complications and tolerated procedure well    Post vital signs: stable    Level of consciousness: awake    Nausea/Vomiting: no nausea/vomiting    Complications: none    Transfer of care protocol was followed      Last vitals: Visit Vitals  BP (!) 164/88   Pulse 75   Temp 37.1 °C (98.8 °F) (Oral)   Resp 14   Ht 5' 9" (1.753 m)   Wt 90.7 kg (200 lb)   SpO2 97%   Breastfeeding No   BMI 29.53 kg/m²     "

## 2024-06-07 NOTE — OP NOTE
Operative Note    Procedure date: 6/7/24    Preoperative Diagnosis:  left ureteral stone, UTI    Postoperative Diagnosis: same    Procedure:  Cystoscopy, left Retrograde Pyelograms,  left Double J Stent    Surgeon:  Humza Saenz MD    Anesthesia:  MAC  EBL:  minimal  Tubes and Drains:  6F x 26 cm left JJ stent  Specimen(s):  left renal pelvis urine for culture    Complications:  none    Indication:  59 yo F with left ureteral stone, UTI.      Findings:  Left mild hydronephrosis with purulent urine sent for culture.  Left stent placed.      Description of Procedure:  The patient was identified and surgical site verification was performed prior to obtaining consent.  The patient was brought to the cystoscopy suite.  SCDs were placed on the bilateral lower extremities and were cycling during the entire procedure.  Adequate MAC anesthesia was obtained, and the patient was positioned in dorsal lithotomy and prepped and draped in the normal standard fashion.  A preoperative Time Out was performed addressing the anticipated surgical site, procedure, and safety precautions; everyone in the room agreed.  The patient received preoperative antibiotics.      The 22 Fr cystoscope was inserted into the patient's urethral meatus and advanced to the bladder.  The  urethra was normal.      The bladder was inspected with the 30 degree lens in its entirety and not lesions, tumors or stones were noted. There was edema and inflammation consistent with cystitis. The ureteral orifices were in their normal anatomic positions.  The bladder showed no trabeculation.     The left ureteral orifice was intubated with a ureteral catheter and contrast was injected in retrograde fashion. This showed left mild hydronephrosis.  The calyces were not blunted.  Cultures were obtained above the level of obstruction by the open-ended ureteral catheter.    Next, a guidewire was passed through the ureteral catheter into the renal pelvis by visual and  fluoroscopic guidance.  The open ended catheter was removed.  Over the guidewire, a jj stent measuring 6Fr  x  26 cm was advanced over the wire.  The wire was then slowly removed showing a proximal curl in the renal pelvis and a distal curl in the bladder.  This was confirmed fluoroscopically.  A string was not left attached to the stent.    The patient's bladder was drained via the cystoscope sheath.  The patient was awakened and transferred to the recovery room in stable condition having tolerated the procedure well.      Dispo: admit for recover, follow up cultures, home on antibiotic for 2 weeks.  Will schedule definitive stone treatment, no appt needed prior.    Humza Saenz MD

## 2024-06-07 NOTE — PLAN OF CARE
SW met with Pt at bedside. Pt demographics confirmed. Pt independent with ADL's. Pt reports no need for HHS. Pt stated she had a walker but had to leave it in [place] when getting on the bus. Pt unfunded so walker would be self pay.  Pt not a dialysis or Coumadin Pt. Pt lives with a friend Toribio Moya. Pt will need transport home at D/C. No other needs identified. SW to request f/u as needed. SW to assist with any future needs.         [facility] - Med Surg  Discharge Assessment    Primary Care Provider: No primary care provider on file.     Discharge Assessment (most recent)       BRIEF DISCHARGE ASSESSMENT - 06/07/24 1630          Discharge Planning    Assessment Type Discharge Planning Brief Assessment (P)      Support Systems Friends/neighbors (P)      Equipment Currently Used at Home none (P)      Current Living Arrangements home (P)      Patient/Family Anticipates Transition to home;home with family (P)      Patient/Family Anticipated Services at Transition none (P)      DME Needed Upon Discharge  walker, rolling (P)      Discharge Plan A Home (P)      Discharge Plan B Home with family (P)                          Mag Sanchez LMSW  Phone: [phone]  [facility]

## 2024-06-07 NOTE — ANESTHESIA POSTPROCEDURE EVALUATION
Anesthesia Post Evaluation    Patient: Josselyn Tarango    Procedure(s) Performed: Procedure(s) (LRB):  CYSTOSCOPY, left retrograde pyelogram, left JJ stent (Left)  PYELOGRAM, RETROGRADE (N/A)    Final Anesthesia Type: general      Patient location during evaluation: PACU  Patient participation: Yes- Able to Participate  Level of consciousness: awake and alert  Post-procedure vital signs: reviewed and stable  Pain management: adequate  Airway patency: patent    PONV status at discharge: No PONV  Anesthetic complications: no      Cardiovascular status: blood pressure returned to baseline  Respiratory status: unassisted  Hydration status: euvolemic                Vitals Value Taken Time   /63 06/07/24 1141   Temp 36.4 °C (97.5 °F) 06/07/24 0755   Pulse 88 06/07/24 1144   Resp 30 06/07/24 1144   SpO2 95 % 06/07/24 1144   Vitals shown include unfiled device data.      No case tracking events are documented in the log.      Pain/Barbra Score: Pain Rating Prior to Med Admin: 6 (6/7/2024  8:40 AM)  Pain Rating Post Med Admin: 5 (6/7/2024  2:28 AM)  Barbra Score: 9 (6/7/2024  7:55 AM)

## 2024-06-07 NOTE — CARE UPDATE
Urology    Sp left stent for ureteral stone/UTI.    --follow up cultures, home when cultures final and on culture specific antibiotic through planned procedure 6/17/24.    -- recommend dc with pyridium 200 mg TID PRN dysuria for 3 days #6, oxybutynin 5 mg TID PRN stent pain for 5 days #15, toradol 10 mg q6h PRN pain, a few rescue tablets of oxycodone 5 mg for breakthrough.    -- call with questions    Humza Saenz MD

## 2024-06-07 NOTE — ANESTHESIA PREPROCEDURE EVALUATION
06/07/2024  Josselyn Tarango is a 58 y.o., female.    No past medical history on file.  No past surgical history on file.        Pre-op Assessment    I have reviewed the Patient Summary Reports.     I have reviewed the Nursing Notes. I have reviewed the NPO Status.      Review of Systems  Anesthesia Hx:   History of prior surgery of interest to airway management or planning:            Denies Personal Hx of Anesthesia complications.                    Cardiovascular:  Exercise tolerance: good                                           Renal/:  Chronic Renal Disease                Endocrine:  Diabetes               Physical Exam  General: Well nourished    Airway:  Mallampati: II   Mouth Opening: Normal  Neck ROM: Normal ROM    Dental:  Intact        Anesthesia Plan  Type of Anesthesia, risks & benefits discussed:    Anesthesia Type: Gen Natural Airway  Informed Consent: Informed consent signed with the Patient and all parties understand the risks and agree with anesthesia plan.  All questions answered.   ASA Score: 2    Ready For Surgery From Anesthesia Perspective.     .

## 2024-06-07 NOTE — ED NOTES
ROCEPHIN COMPLETE, LINE FLUSHED WITH NS.  VSS PER MONITOR, PT RESTING ON LEFT SIDE IN NAD.  CALL LIGHT IN REACH

## 2024-06-07 NOTE — ASSESSMENT & PLAN NOTE
Patient states that she was diagnosed with diabetes over the past year with a provider in [place]    Plan:  Order hemoglobin A1c  Consider sliding scale if blood glucose is greatly above 180

## 2024-06-07 NOTE — PROGRESS NOTES
VIRTUAL NURSE:  Cued into patient's room.  Permission received per patient to turn camera to view patient.  Introduced as VN that will be working with floor nurse and nursing assistant.  Educated patient on VN's role in patient care and  VIP model.  Plan of care reviewed with patient.  Education per flowsheet.   Informed patient that staff will round on them every 2 hours but to use call light for any other needs they may have; informed of fall risk and fall precautions.  Patient verbalized understanding.  Call light within reach; bed siderails up x3.  Opportunity given for questions and questions answered.  Admission assessment questions answered.  Patient denies complaints or any needs at this time. Instructed to call for assistance.  Will cont to monitor and intervene as needed.    Labs, notes, orders, and careplan reviewed.        06/07/24 1444   Admission   Initial VN Admission Questions Complete   Communication Issues? Patient Vision   Shift   Virtual Nurse - Rounding Complete   Pain Management Interventions quiet environment facilitated;relaxation techniques promoted   Virtual Nurse - Patient Verbalized Approval Of VN Rounding;Camera Use   Type of Frequent Check   Type Patient Rounds   Safety/Activity   Patient Rounds bed in low position;bed wheels locked;call light in patient/parent reach;clutter free environment maintained;ID band on;visualized patient;placement of personal items at bedside   Safety Promotion/Fall Prevention Fall Risk reviewed with patient/family;instructed to call staff for mobility

## 2024-06-07 NOTE — CONSULTS
[facility] - Emergency Dept  Urology  Consult Note    Patient Name: Josselyn Tarango  MRN: [id number]  Admission Date: 6/7/2024  Attending Provider: Freddy Yu MD   Consulting Provider: Humza Saenz MD  Principal Problem:UTI (urinary tract infection)    Inpatient consult to Urology  Consult performed by: Humza Saenz MD  Consult ordered by: Steven Encarnacion MD          Subjective:     HPI:   Josselyn Tarango is a 58 y.o. female who presents with left flank pain.    Has been having left-sided abdominal and flank pain for a few days.  Has had some loose bowel movements.  CT scan of the emergency department showed a left distal ureteral stone, 7 mm in size, and bilateral nonobstructing kidney stones.  UA was concerning for infection with nitrites white blood cells and leukocyte esterase.  White blood cell count was elevated.  Urology was consulted.    States no history of kidney stones in the past.  Currently with left-sided flank pain.  Denies fever or chills.  No nausea or vomiting.    No past medical history on file.    No past surgical history on file.    Review of patient's allergies indicates:   Allergen Reactions    Sulfa (sulfonamide antibiotics)        Family History    None         Tobacco Use    Smoking status: Not on file    Smokeless tobacco: Not on file   Substance and Sexual Activity    Alcohol use: Not on file    Drug use: Not on file    Sexual activity: Not on file       Review of Systems   Constitutional:  Negative for chills, fatigue and fever.   Respiratory:  Negative for shortness of breath.    Cardiovascular:  Negative for chest pain.   Gastrointestinal:  Negative for abdominal pain, nausea and vomiting.   Genitourinary:  Positive for flank pain. Negative for difficulty urinating, dysuria and hematuria.   Neurological:  Negative for dizziness.   Psychiatric/Behavioral:  Negative for behavioral problems.        Objective:     Temp:  [98.8 °F (37.1 °C)-99.4 °F (37.4 °C)] 98.8  °F (37.1 °C)  Pulse:  [75-98] 75  Resp:  [14-18] 14  SpO2:  [95 %-98 %] 97 %  BP: (136-165)/(75-98) 164/88       NAD  RRR  CTAB  ABD S/ND/NTTP       Ext: no edema      Significant Labs:  BMP:  Recent Labs   Lab 06/07/24  0137      K 3.6      CO2 19*   BUN 6   CREATININE 1.0   CALCIUM 9.3       CBC:  Recent Labs   Lab 06/07/24  0137   WBC 17.50*   HGB 13.5   HCT 39.5          Urinalysis  Recent Labs   Lab 06/07/24  0220   COLORU Yellow   SPECGRAV 1.020   PHUR 6.0   PROTEINUA 2+*   BACTERIA Occasional   NITRITE Positive*   LEUKOCYTESUR 3+*   UROBILINOGEN Negative   HYALINECASTS 0       Results for orders placed or performed during the hospital encounter of 06/07/24 (from the past 2160 hour(s))   CT Abdomen Pelvis  Without Contrast    Narrative    EXAMINATION:  CT ABDOMEN PELVIS WITHOUT CONTRAST    CLINICAL HISTORY:  LLQ abdominal pain;Nausea/vomiting;    TECHNIQUE:  Low dose axial images, sagittal and coronal reformations were obtained from the lung bases to the pubic symphysis, Oral contrast was not administered.    COMPARISON:  None    FINDINGS:  Heart: Normal in size. No pericardial effusion.    Lung Bases: Well aerated, without consolidation or pleural fluid.    Liver: Normal in size and attenuation, with no focal hepatic lesions.    Gallbladder: No calcified gallstones.  No CT evidence of cholecystitis.    Bile Ducts: No evidence of dilated ducts.    Pancreas: No mass or peripancreatic fat stranding.    Spleen: Unremarkable.    Adrenals: There is a 4.4 x 3.5 by 3.2 cm left adrenal fatty mass compatible with adenoma.  Right adrenal gland is unremarkable..    Kidneys/ Ureters: There is a 7 by 4 0.3 mm calculus in the distal left ureter with moderate ureterectasis and hydronephrosis on the left.  A nonobstructing calculus also noted in the left kidney measuring 9 x 3 mm in the midpole.  There is a punctate nonobstructing calculus in the right kidney lower pole.  No other calculi are seen  bilaterally.  Vascular calcifications bilaterally.  There is 5 cm simple cyst in the right kidney.    Bladder: No evidence of wall thickening.    Reproductive organs: Patient is post hysterectomy..  No adnexal mass.    GI Tract/Mesentery: There is diverticulosis of the distal colon.  No evidence diverticulitis.  No evidence of bowel obstruction or inflammation.  Sutures around the cecum suggest appendectomy.    Peritoneal Space: No ascites. No free air.    Retroperitoneum: No significant adenopathy.    Abdominal wall: Left lower quadrant fat containing hernia..    Vasculature: There is moderate aortic atherosclerosis.  There is mild ectasia measuring up to 2.2 cm in diameter along the infrarenal aorta.  No significant aneurysm.    Bones: No acute fracture.  Spine appears aligned.      Impression    7 x 4.3 cm obstructing calculus in the distal left ureter with moderate hydronephrosis.    Nonobstructing calculi also noted bilaterally as well as a simple cyst on the right.    Left adrenal adenoma.    Moderate aortic atherosclerosis.  No aneurysm.    Diverticulosis without diverticulitis.    Additional incidental nonacute findings as above.    This report was flagged in Epic as abnormal.      Electronically signed by: Gini Gayle  Date:    06/07/2024  Time:    01:53       Significant Imaging:  CT: I have reviewed all results within the past 24 hours and my personal findings are:  Per HPI      Assessment:     Problem Noted   Left Ureteral Stone 6/7/2024    Left 7 mm distal ureteral stone, bilateral nonobstructing stones.       Plan:     Given left ureteral stone with concern for infection recommend urgent left double-J stent placement.  Risks, benefits alternatives to procedure discussed with the patient.  She understands she needs to follow up for definitive stone management.  Patient consented  Antibiotics  OR for cystoscopy, left retrograde pyelogram left double-J stent.    Thank you for your consult.     Humza  JOE Saenz MD  [facility]

## 2024-06-07 NOTE — NURSING
Patient transferred to  unit per stretcher. Patient ambulated from stretcher to bed. Patient alert and awake. Vitals taken. Oriented to rm set up. Call light  within reach. Bed alarm on.

## 2024-06-07 NOTE — ASSESSMENT & PLAN NOTE
Left-sided hydronephrosis noted    Plan:  Consult Urology for possible PCN due to obstructing stone

## 2024-06-07 NOTE — ASSESSMENT & PLAN NOTE
UA significant for UTI    Plan:  Urine culture pending  IV ceftriaxone daily for 3-5 day total course, transition to oral agents when sensitivities arrive

## 2024-06-08 LAB
ANION GAP SERPL CALC-SCNC: 9 MMOL/L (ref 8–16)
BACTERIA UR CULT: NO GROWTH
BASOPHILS # BLD AUTO: 0.04 K/UL (ref 0–0.2)
BASOPHILS NFR BLD: 0.3 % (ref 0–1.9)
BUN SERPL-MCNC: 8 MG/DL (ref 6–20)
CALCIUM SERPL-MCNC: 8.5 MG/DL (ref 8.7–10.5)
CHLORIDE SERPL-SCNC: 104 MMOL/L (ref 95–110)
CO2 SERPL-SCNC: 23 MMOL/L (ref 23–29)
CREAT SERPL-MCNC: 1 MG/DL (ref 0.5–1.4)
DIFFERENTIAL METHOD BLD: ABNORMAL
EOSINOPHIL # BLD AUTO: 0.1 K/UL (ref 0–0.5)
EOSINOPHIL NFR BLD: 0.4 % (ref 0–8)
ERYTHROCYTE [DISTWIDTH] IN BLOOD BY AUTOMATED COUNT: 15.1 % (ref 11.5–14.5)
EST. GFR  (NO RACE VARIABLE): >60 ML/MIN/1.73 M^2
ESTIMATED AVG GLUCOSE: 105 MG/DL (ref 68–131)
GLUCOSE SERPL-MCNC: 108 MG/DL (ref 70–110)
HBA1C MFR BLD: 5.3 % (ref 4–5.6)
HCT VFR BLD AUTO: 36.5 % (ref 37–48.5)
HGB BLD-MCNC: 12.1 G/DL (ref 12–16)
IMM GRANULOCYTES # BLD AUTO: 0.04 K/UL (ref 0–0.04)
IMM GRANULOCYTES NFR BLD AUTO: 0.3 % (ref 0–0.5)
LYMPHOCYTES # BLD AUTO: 2 K/UL (ref 1–4.8)
LYMPHOCYTES NFR BLD: 16.4 % (ref 18–48)
MCH RBC QN AUTO: 28.5 PG (ref 27–31)
MCHC RBC AUTO-ENTMCNC: 33.2 G/DL (ref 32–36)
MCV RBC AUTO: 86 FL (ref 82–98)
MONOCYTES # BLD AUTO: 1.1 K/UL (ref 0.3–1)
MONOCYTES NFR BLD: 8.7 % (ref 4–15)
NEUTROPHILS # BLD AUTO: 9.1 K/UL (ref 1.8–7.7)
NEUTROPHILS NFR BLD: 73.9 % (ref 38–73)
NRBC BLD-RTO: 0 /100 WBC
PLATELET # BLD AUTO: 241 K/UL (ref 150–450)
PMV BLD AUTO: 10.3 FL (ref 9.2–12.9)
POTASSIUM SERPL-SCNC: 3.3 MMOL/L (ref 3.5–5.1)
RBC # BLD AUTO: 4.25 M/UL (ref 4–5.4)
SODIUM SERPL-SCNC: 136 MMOL/L (ref 136–145)
WBC # BLD AUTO: 12.33 K/UL (ref 3.9–12.7)

## 2024-06-08 PROCEDURE — 25000003 PHARM REV CODE 250: Performed by: UROLOGY

## 2024-06-08 PROCEDURE — 25000003 PHARM REV CODE 250: Performed by: INTERNAL MEDICINE

## 2024-06-08 PROCEDURE — 80048 BASIC METABOLIC PNL TOTAL CA: CPT | Performed by: UROLOGY

## 2024-06-08 PROCEDURE — 85025 COMPLETE CBC W/AUTO DIFF WBC: CPT | Performed by: UROLOGY

## 2024-06-08 PROCEDURE — 83036 HEMOGLOBIN GLYCOSYLATED A1C: CPT | Performed by: UROLOGY

## 2024-06-08 PROCEDURE — 94761 N-INVAS EAR/PLS OXIMETRY MLT: CPT

## 2024-06-08 PROCEDURE — 36415 COLL VENOUS BLD VENIPUNCTURE: CPT | Performed by: UROLOGY

## 2024-06-08 PROCEDURE — 63600175 PHARM REV CODE 636 W HCPCS: Performed by: UROLOGY

## 2024-06-08 PROCEDURE — 11000001 HC ACUTE MED/SURG PRIVATE ROOM

## 2024-06-08 RX ADMIN — ACETAMINOPHEN 650 MG: 325 TABLET ORAL at 06:06

## 2024-06-08 RX ADMIN — ALUMINUM HYDROXIDE, MAGNESIUM HYDROXIDE, AND SIMETHICONE 30 ML: 1200; 120; 1200 SUSPENSION ORAL at 11:06

## 2024-06-08 RX ADMIN — ALUMINUM HYDROXIDE, MAGNESIUM HYDROXIDE, AND SIMETHICONE 30 ML: 1200; 120; 1200 SUSPENSION ORAL at 04:06

## 2024-06-08 RX ADMIN — HYDROCODONE BITARTRATE AND ACETAMINOPHEN 1 TABLET: 5; 325 TABLET ORAL at 09:06

## 2024-06-08 RX ADMIN — HYDROCODONE BITARTRATE AND ACETAMINOPHEN 1 TABLET: 5; 325 TABLET ORAL at 03:06

## 2024-06-08 RX ADMIN — SUCRALFATE 1 G: 1 SUSPENSION ORAL at 11:06

## 2024-06-08 RX ADMIN — ALUMINUM HYDROXIDE, MAGNESIUM HYDROXIDE, AND SIMETHICONE 30 ML: 1200; 120; 1200 SUSPENSION ORAL at 08:06

## 2024-06-08 RX ADMIN — ALUMINUM HYDROXIDE, MAGNESIUM HYDROXIDE, AND SIMETHICONE 30 ML: 1200; 120; 1200 SUSPENSION ORAL at 06:06

## 2024-06-08 RX ADMIN — ACETAMINOPHEN 650 MG: 325 TABLET ORAL at 08:06

## 2024-06-08 RX ADMIN — SUCRALFATE 1 G: 1 SUSPENSION ORAL at 04:06

## 2024-06-08 RX ADMIN — SUCRALFATE 1 G: 1 SUSPENSION ORAL at 06:06

## 2024-06-08 RX ADMIN — ACETAMINOPHEN 650 MG: 325 TABLET ORAL at 11:06

## 2024-06-08 RX ADMIN — ONDANSETRON 4 MG: 2 INJECTION INTRAMUSCULAR; INTRAVENOUS at 02:06

## 2024-06-08 RX ADMIN — CEFTRIAXONE SODIUM 1 G: 1 INJECTION, POWDER, FOR SOLUTION INTRAMUSCULAR; INTRAVENOUS at 08:06

## 2024-06-08 NOTE — ASSESSMENT & PLAN NOTE
UA significant for UTI    Plan:  Urine culture pending  IV ceftriaxone daily for 3-5 day total course, transition to oral agents when sensitivities arrive  Keep on IV antibiotics, follow up urine culture

## 2024-06-08 NOTE — PROGRESS NOTES
[facility] Medicine  Progress Note    Patient Name: Josselyn Tarango  MRN: [id number]  Patient Class: IP- Inpatient   Admission Date: 6/7/2024  Length of Stay: 1 days  Attending Physician: Freddy Yu MD  Primary Care Provider: No primary care provider on file.        Subjective:     Principal Problem:UTI (urinary tract infection)        HPI:  Josselyn Tarango is a 58-year-old female with a past medical history of stomach ulcer, diabetes who presents with abdominal pain diarrhea.    Patient states that over the past few days she has had several loose nonbloody non mucous bowel movements as well as left lower abdominal pain.  Due to worsening pain she decided to call the ambulance, where she noticed that the lower abdominal pain also radiated to the back.    Triage vitals were remarkable for elevated blood pressures.  Review of systems significant for abdominal pain and diarrhea.  Physical exam significant for some tenderness to palpation in the lower left abdominal and flank pain area.    CBC significant for leukocytosis.  CMP significant for elevated sugars.    UA significant for UTI, with several WBCs, nitrates, leukocyte esterase.  Urine culture pending.    CT abdomen and pelvis was concerning for obstructing calculus in the distal left ureter with moderate hydronephrosis, bilateral nonobstructing calculi, left adrenal adenoma, diverticulosis.    Patient was given dose of IV Rocephin.    Patient admitted for UTI, obstructing nephrolithiasis, and hydronephrosis management.  Urology consultation in the a.m.    Overview/Hospital Course:  No notes on file    Interval History: Patient seen and examined, flank pain , N/V poor appetite. Afebrile, no falls or aspiration. Up in bed.     Review of Systems  Objective:     Vital Signs (Most Recent):  Temp: 98.5 °F (36.9 °C) (06/08/24 1521)  Pulse: 78 (06/08/24 1521)  Resp: 18 (06/08/24 1521)  BP: (!) 143/66 (06/08/24 1521)  SpO2: 97 % (06/08/24 1521)  "Vital Signs (24h Range):  Temp:  [98.4 °F (36.9 °C)-101.1 °F (38.4 °C)] 98.5 °F (36.9 °C)  Pulse:  [] 78  Resp:  [18-20] 18  SpO2:  [92 %-97 %] 97 %  BP: (111-143)/(56-88) 143/66     Weight: 103.4 kg (228 lb)  Body mass index is 33.67 kg/m².    Intake/Output Summary (Last 24 hours) at 6/8/2024 1549  Last data filed at 6/8/2024 0149  Gross per 24 hour   Intake --   Output 500 ml   Net -500 ml         Physical Exam  Constitutional:       Appearance: She is obese.   HENT:      Head: Normocephalic.      Nose: Nose normal.      Mouth/Throat:      Mouth: Mucous membranes are moist.   Eyes:      Extraocular Movements: Extraocular movements intact.      Conjunctiva/sclera: Conjunctivae normal.      Pupils: Pupils are equal, round, and reactive to light.   Cardiovascular:      Rate and Rhythm: Normal rate.   Abdominal:      General: Abdomen is flat. Bowel sounds are normal.      Palpations: Abdomen is soft.   Musculoskeletal:      Cervical back: Normal range of motion.   Skin:     General: Skin is warm.      Capillary Refill: Capillary refill takes less than 2 seconds.   Neurological:      General: No focal deficit present.      Mental Status: She is alert.   Psychiatric:         Mood and Affect: Mood normal.             Significant Labs: All pertinent labs within the past 24 hours have been reviewed.  Blood Culture: No results for input(s): "LABBLOO" in the last 48 hours.  BMP:   Recent Labs   Lab 06/08/24  0626         K 3.3*      CO2 23   BUN 8   CREATININE 1.0   CALCIUM 8.5*     CBC:   Recent Labs   Lab 06/07/24  0137 06/08/24  0626   WBC 17.50* 12.33   HGB 13.5 12.1   HCT 39.5 36.5*    241     Urine Culture:   Recent Labs   Lab 06/07/24  0745   LABURIN No growth     Urine Studies:   Recent Labs   Lab 06/07/24  0220 06/07/24  0745   COLORU Yellow Yellow   APPEARANCEUA Cloudy* Cloudy*   PHUR 6.0 6.0   SPECGRAV 1.020 1.020   PROTEINUA 2+* 3+*   GLUCUA Negative Trace*   KETONESU Trace* " Negative   BILIRUBINUA Negative Negative   OCCULTUA 2+* 3+*   NITRITE Positive* Positive*   UROBILINOGEN Negative Negative   LEUKOCYTESUR 3+* 3+*   RBCUA 43* 30*   WBCUA >100* >100*   BACTERIA Occasional Moderate*   SQUAMEPITHEL 10  --    HYALINECASTS 0 0       Significant Imaging: I have reviewed all pertinent imaging results/findings within the past 24 hours.    Assessment/Plan:      * UTI (urinary tract infection)  UA significant for UTI    Plan:  Urine culture pending  IV ceftriaxone daily for 3-5 day total course, transition to oral agents when sensitivities arrive  Keep on IV antibiotics, follow up urine culture      Adrenal adenoma  Incidental finding on CT scan  Likely benign    Plan:  Continue to monitor outpatient      Nephrolithiasis  CT abdominal pelvis significant for obstructing renal stone on left side, bilateral nonobstructing stones    Plan:  Consult Urology for possible lithotripsy    Type 2 diabetes mellitus, without long-term current use of insulin  Patient states that she was diagnosed with diabetes over the past year with a provider in [place]    Plan:  Order hemoglobin A1c  Consider sliding scale if blood glucose is greatly above 180      Hydronephrosis  Left-sided hydronephrosis noted    Plan:  Consult Urology for possible PCN due to obstructing stone        VTE Risk Mitigation (From admission, onward)           Ordered     IP VTE LOW RISK PATIENT  Once         06/07/24 0459     Place sequential compression device  Until discontinued         06/07/24 0459                    Discharge Planning   PIERO:      Code Status: Full Code   Is the patient medically ready for discharge?:     Reason for patient still in hospital (select all that apply): Patient unstable  Discharge Plan A: Home                  Freddy Yu MD  Department of Hospital Medicine   [facility]

## 2024-06-08 NOTE — SUBJECTIVE & OBJECTIVE
"Interval History: Patient seen and examined, flank pain , N/V poor appetite. Afebrile, no falls or aspiration. Up in bed.     Review of Systems  Objective:     Vital Signs (Most Recent):  Temp: 98.5 °F (36.9 °C) (06/08/24 1521)  Pulse: 78 (06/08/24 1521)  Resp: 18 (06/08/24 1521)  BP: (!) 143/66 (06/08/24 1521)  SpO2: 97 % (06/08/24 1521) Vital Signs (24h Range):  Temp:  [98.4 °F (36.9 °C)-101.1 °F (38.4 °C)] 98.5 °F (36.9 °C)  Pulse:  [] 78  Resp:  [18-20] 18  SpO2:  [92 %-97 %] 97 %  BP: (111-143)/(56-88) 143/66     Weight: 103.4 kg (228 lb)  Body mass index is 33.67 kg/m².    Intake/Output Summary (Last 24 hours) at 6/8/2024 1549  Last data filed at 6/8/2024 0149  Gross per 24 hour   Intake --   Output 500 ml   Net -500 ml         Physical Exam  Constitutional:       Appearance: She is obese.   HENT:      Head: Normocephalic.      Nose: Nose normal.      Mouth/Throat:      Mouth: Mucous membranes are moist.   Eyes:      Extraocular Movements: Extraocular movements intact.      Conjunctiva/sclera: Conjunctivae normal.      Pupils: Pupils are equal, round, and reactive to light.   Cardiovascular:      Rate and Rhythm: Normal rate.   Abdominal:      General: Abdomen is flat. Bowel sounds are normal.      Palpations: Abdomen is soft.   Musculoskeletal:      Cervical back: Normal range of motion.   Skin:     General: Skin is warm.      Capillary Refill: Capillary refill takes less than 2 seconds.   Neurological:      General: No focal deficit present.      Mental Status: She is alert.   Psychiatric:         Mood and Affect: Mood normal.             Significant Labs: All pertinent labs within the past 24 hours have been reviewed.  Blood Culture: No results for input(s): "LABBLOO" in the last 48 hours.  BMP:   Recent Labs   Lab 06/08/24  0626         K 3.3*      CO2 23   BUN 8   CREATININE 1.0   CALCIUM 8.5*     CBC:   Recent Labs   Lab 06/07/24  0137 06/08/24  0626   WBC 17.50* 12.33   HGB 13.5 " 12.1   HCT 39.5 36.5*    241     Urine Culture:   Recent Labs   Lab 06/07/24  0745   LABURIN No growth     Urine Studies:   Recent Labs   Lab 06/07/24  0220 06/07/24 0745   COLORU Yellow Yellow   APPEARANCEUA Cloudy* Cloudy*   PHUR 6.0 6.0   SPECGRAV 1.020 1.020   PROTEINUA 2+* 3+*   GLUCUA Negative Trace*   KETONESU Trace* Negative   BILIRUBINUA Negative Negative   OCCULTUA 2+* 3+*   NITRITE Positive* Positive*   UROBILINOGEN Negative Negative   LEUKOCYTESUR 3+* 3+*   RBCUA 43* 30*   WBCUA >100* >100*   BACTERIA Occasional Moderate*   SQUAMEPITHEL 10  --    HYALINECASTS 0 0       Significant Imaging: I have reviewed all pertinent imaging results/findings within the past 24 hours.

## 2024-06-09 LAB
ANION GAP SERPL CALC-SCNC: 11 MMOL/L (ref 8–16)
BASOPHILS # BLD AUTO: 0.03 K/UL (ref 0–0.2)
BASOPHILS NFR BLD: 0.3 % (ref 0–1.9)
BUN SERPL-MCNC: 7 MG/DL (ref 6–20)
CALCIUM SERPL-MCNC: 8.5 MG/DL (ref 8.7–10.5)
CHLORIDE SERPL-SCNC: 104 MMOL/L (ref 95–110)
CO2 SERPL-SCNC: 25 MMOL/L (ref 23–29)
CREAT SERPL-MCNC: 0.9 MG/DL (ref 0.5–1.4)
DIFFERENTIAL METHOD BLD: ABNORMAL
EOSINOPHIL # BLD AUTO: 0.2 K/UL (ref 0–0.5)
EOSINOPHIL NFR BLD: 1.4 % (ref 0–8)
ERYTHROCYTE [DISTWIDTH] IN BLOOD BY AUTOMATED COUNT: 14.8 % (ref 11.5–14.5)
EST. GFR  (NO RACE VARIABLE): >60 ML/MIN/1.73 M^2
GLUCOSE SERPL-MCNC: 107 MG/DL (ref 70–110)
HCT VFR BLD AUTO: 34.9 % (ref 37–48.5)
HGB BLD-MCNC: 11.7 G/DL (ref 12–16)
IMM GRANULOCYTES # BLD AUTO: 0.03 K/UL (ref 0–0.04)
IMM GRANULOCYTES NFR BLD AUTO: 0.3 % (ref 0–0.5)
LYMPHOCYTES # BLD AUTO: 1.8 K/UL (ref 1–4.8)
LYMPHOCYTES NFR BLD: 16.7 % (ref 18–48)
MCH RBC QN AUTO: 28.7 PG (ref 27–31)
MCHC RBC AUTO-ENTMCNC: 33.5 G/DL (ref 32–36)
MCV RBC AUTO: 86 FL (ref 82–98)
MONOCYTES # BLD AUTO: 0.8 K/UL (ref 0.3–1)
MONOCYTES NFR BLD: 7.6 % (ref 4–15)
NEUTROPHILS # BLD AUTO: 7.9 K/UL (ref 1.8–7.7)
NEUTROPHILS NFR BLD: 73.7 % (ref 38–73)
NRBC BLD-RTO: 0 /100 WBC
PLATELET # BLD AUTO: 249 K/UL (ref 150–450)
PMV BLD AUTO: 10.6 FL (ref 9.2–12.9)
POTASSIUM SERPL-SCNC: 3.2 MMOL/L (ref 3.5–5.1)
RBC # BLD AUTO: 4.08 M/UL (ref 4–5.4)
SODIUM SERPL-SCNC: 140 MMOL/L (ref 136–145)
WBC # BLD AUTO: 10.77 K/UL (ref 3.9–12.7)

## 2024-06-09 PROCEDURE — 25000003 PHARM REV CODE 250: Performed by: INTERNAL MEDICINE

## 2024-06-09 PROCEDURE — 36415 COLL VENOUS BLD VENIPUNCTURE: CPT | Performed by: UROLOGY

## 2024-06-09 PROCEDURE — 25000003 PHARM REV CODE 250: Performed by: UROLOGY

## 2024-06-09 PROCEDURE — 85025 COMPLETE CBC W/AUTO DIFF WBC: CPT | Performed by: UROLOGY

## 2024-06-09 PROCEDURE — 80048 BASIC METABOLIC PNL TOTAL CA: CPT | Performed by: UROLOGY

## 2024-06-09 PROCEDURE — 94761 N-INVAS EAR/PLS OXIMETRY MLT: CPT

## 2024-06-09 PROCEDURE — 63600175 PHARM REV CODE 636 W HCPCS: Performed by: UROLOGY

## 2024-06-09 PROCEDURE — S4991 NICOTINE PATCH NONLEGEND: HCPCS | Performed by: INTERNAL MEDICINE

## 2024-06-09 PROCEDURE — 11000001 HC ACUTE MED/SURG PRIVATE ROOM

## 2024-06-09 RX ORDER — IBUPROFEN 200 MG
1 TABLET ORAL DAILY
Status: DISCONTINUED | OUTPATIENT
Start: 2024-06-09 | End: 2024-06-10 | Stop reason: HOSPADM

## 2024-06-09 RX ADMIN — ALUMINUM HYDROXIDE, MAGNESIUM HYDROXIDE, AND SIMETHICONE 30 ML: 1200; 120; 1200 SUSPENSION ORAL at 09:06

## 2024-06-09 RX ADMIN — HYDROCODONE BITARTRATE AND ACETAMINOPHEN 1 TABLET: 5; 325 TABLET ORAL at 03:06

## 2024-06-09 RX ADMIN — HYDROCODONE BITARTRATE AND ACETAMINOPHEN 1 TABLET: 5; 325 TABLET ORAL at 05:06

## 2024-06-09 RX ADMIN — SUCRALFATE 1 G: 1 SUSPENSION ORAL at 01:06

## 2024-06-09 RX ADMIN — SUCRALFATE 1 G: 1 SUSPENSION ORAL at 05:06

## 2024-06-09 RX ADMIN — HYDROCODONE BITARTRATE AND ACETAMINOPHEN 1 TABLET: 5; 325 TABLET ORAL at 11:06

## 2024-06-09 RX ADMIN — Medication 6 MG: at 09:06

## 2024-06-09 RX ADMIN — NICOTINE 1 PATCH: 21 PATCH, EXTENDED RELEASE TRANSDERMAL at 02:06

## 2024-06-09 RX ADMIN — ALUMINUM HYDROXIDE, MAGNESIUM HYDROXIDE, AND SIMETHICONE 30 ML: 1200; 120; 1200 SUSPENSION ORAL at 05:06

## 2024-06-09 RX ADMIN — ACETAMINOPHEN 650 MG: 325 TABLET ORAL at 09:06

## 2024-06-09 RX ADMIN — SUCRALFATE 1 G: 1 SUSPENSION ORAL at 11:06

## 2024-06-09 RX ADMIN — ALUMINUM HYDROXIDE, MAGNESIUM HYDROXIDE, AND SIMETHICONE 30 ML: 1200; 120; 1200 SUSPENSION ORAL at 11:06

## 2024-06-09 RX ADMIN — ONDANSETRON 4 MG: 2 INJECTION INTRAMUSCULAR; INTRAVENOUS at 07:06

## 2024-06-09 RX ADMIN — CEFTRIAXONE SODIUM 1 G: 1 INJECTION, POWDER, FOR SOLUTION INTRAMUSCULAR; INTRAVENOUS at 09:06

## 2024-06-09 NOTE — PLAN OF CARE
Problem: Adult Inpatient Plan of Care  Goal: Plan of Care Review  Outcome: Progressing     VIRTUAL NURSE:  Labs, notes, orders, and careplan reviewed.     0

## 2024-06-09 NOTE — SUBJECTIVE & OBJECTIVE
Interval History: Seen and examined at bedside, up in bed, diarrhea subsided, still has flank pain , not in acute distress, afebrile     Review of Systems  Objective:     Vital Signs (Most Recent):  Temp: 98.9 °F (37.2 °C) (06/09/24 1223)  Pulse: 74 (06/09/24 1223)  Resp: 18 (06/09/24 1223)  BP: 128/60 (06/09/24 1223)  SpO2: 95 % (06/09/24 1223) Vital Signs (24h Range):  Temp:  [98.4 °F (36.9 °C)-98.9 °F (37.2 °C)] 98.9 °F (37.2 °C)  Pulse:  [74-83] 74  Resp:  [18-19] 18  SpO2:  [95 %-97 %] 95 %  BP: (107-143)/(56-66) 128/60     Weight: 103.4 kg (228 lb)  Body mass index is 33.67 kg/m².  No intake or output data in the 24 hours ending 06/09/24 1358      Physical Exam  Constitutional:       Appearance: She is normal weight.   HENT:      Head: Normocephalic.      Nose: Nose normal.      Mouth/Throat:      Mouth: Mucous membranes are dry.      Pharynx: Oropharynx is clear.   Eyes:      Conjunctiva/sclera: Conjunctivae normal.      Pupils: Pupils are equal, round, and reactive to light.   Cardiovascular:      Rate and Rhythm: Normal rate.      Pulses: Normal pulses.   Pulmonary:      Effort: Pulmonary effort is normal.   Abdominal:      General: Abdomen is flat. Bowel sounds are normal.      Palpations: Abdomen is soft.   Musculoskeletal:         General: Normal range of motion.      Cervical back: Normal range of motion.   Skin:     General: Skin is warm.      Capillary Refill: Capillary refill takes less than 2 seconds.   Neurological:      General: No focal deficit present.      Mental Status: She is alert. Mental status is at baseline. She is disoriented.   Psychiatric:         Mood and Affect: Mood normal.             Significant Labs: All pertinent labs within the past 24 hours have been reviewed.  BMP:   Recent Labs   Lab 06/09/24  0629         K 3.2*      CO2 25   BUN 7   CREATININE 0.9   CALCIUM 8.5*     CBC:   Recent Labs   Lab 06/08/24  0626 06/09/24  0629   WBC 12.33 10.77   HGB 12.1 11.7*  "  HCT 36.5* 34.9*    249     Magnesium: No results for input(s): "MG" in the last 48 hours.    Significant Imaging: I have reviewed all pertinent imaging results/findings within the past 24 hours.  "

## 2024-06-09 NOTE — PLAN OF CARE
Problem: Adult Inpatient Plan of Care  Goal: Plan of Care Review  Outcome: Progressing     Problem: UTI (Urinary Tract Infection)  Goal: Improved Infection Symptoms  Outcome: Progressing     Problem: Pain Acute  Goal: Optimal Pain Control and Function  Outcome: Progressing     Problem: Fall Injury Risk  Goal: Absence of Fall and Fall-Related Injury  Outcome: Progressing       Patient is AAOx4 with complaints of abdomen pain with relief from norco. Pt with diarrhea that she states is slowing down. Intermittent nausea reported. Pt up in room walking independently.

## 2024-06-09 NOTE — ASSESSMENT & PLAN NOTE
UA significant for UTI    Plan:  Urine culture pending  IV ceftriaxone daily for 3-5 day total course, transition to oral agents when sensitivities arrive  Keep on IV antibiotics, follow up urine culture  Urine culture gram negative rods - await sensitivity

## 2024-06-09 NOTE — PROGRESS NOTES
[facility] Medicine  Progress Note    Patient Name: Josselyn Tarango  MRN: [id number]  Patient Class: IP- Inpatient   Admission Date: 6/7/2024  Length of Stay: 2 days  Attending Physician: Freddy uY MD  Primary Care Provider: No primary care provider on file.        Subjective:     Principal Problem:UTI (urinary tract infection)        HPI:  Josselyn Tarango is a 58-year-old female with a past medical history of stomach ulcer, diabetes who presents with abdominal pain diarrhea.    Patient states that over the past few days she has had several loose nonbloody non mucous bowel movements as well as left lower abdominal pain.  Due to worsening pain she decided to call the ambulance, where she noticed that the lower abdominal pain also radiated to the back.    Triage vitals were remarkable for elevated blood pressures.  Review of systems significant for abdominal pain and diarrhea.  Physical exam significant for some tenderness to palpation in the lower left abdominal and flank pain area.    CBC significant for leukocytosis.  CMP significant for elevated sugars.    UA significant for UTI, with several WBCs, nitrates, leukocyte esterase.  Urine culture pending.    CT abdomen and pelvis was concerning for obstructing calculus in the distal left ureter with moderate hydronephrosis, bilateral nonobstructing calculi, left adrenal adenoma, diverticulosis.    Patient was given dose of IV Rocephin.    Patient admitted for UTI, obstructing nephrolithiasis, and hydronephrosis management.  Urology consultation in the a.m.    Overview/Hospital Course:  No notes on file    Interval History: Seen and examined at bedside, up in bed, diarrhea subsided, still has flank pain , not in acute distress, afebrile     Review of Systems  Objective:     Vital Signs (Most Recent):  Temp: 98.9 °F (37.2 °C) (06/09/24 1223)  Pulse: 74 (06/09/24 1223)  Resp: 18 (06/09/24 1223)  BP: 128/60 (06/09/24 1223)  SpO2: 95 % (06/09/24  "1223) Vital Signs (24h Range):  Temp:  [98.4 °F (36.9 °C)-98.9 °F (37.2 °C)] 98.9 °F (37.2 °C)  Pulse:  [74-83] 74  Resp:  [18-19] 18  SpO2:  [95 %-97 %] 95 %  BP: (107-143)/(56-66) 128/60     Weight: 103.4 kg (228 lb)  Body mass index is 33.67 kg/m².  No intake or output data in the 24 hours ending 06/09/24 1358      Physical Exam  Constitutional:       Appearance: She is normal weight.   HENT:      Head: Normocephalic.      Nose: Nose normal.      Mouth/Throat:      Mouth: Mucous membranes are dry.      Pharynx: Oropharynx is clear.   Eyes:      Conjunctiva/sclera: Conjunctivae normal.      Pupils: Pupils are equal, round, and reactive to light.   Cardiovascular:      Rate and Rhythm: Normal rate.      Pulses: Normal pulses.   Pulmonary:      Effort: Pulmonary effort is normal.   Abdominal:      General: Abdomen is flat. Bowel sounds are normal.      Palpations: Abdomen is soft.   Musculoskeletal:         General: Normal range of motion.      Cervical back: Normal range of motion.   Skin:     General: Skin is warm.      Capillary Refill: Capillary refill takes less than 2 seconds.   Neurological:      General: No focal deficit present.      Mental Status: She is alert. Mental status is at baseline. She is disoriented.   Psychiatric:         Mood and Affect: Mood normal.             Significant Labs: All pertinent labs within the past 24 hours have been reviewed.  BMP:   Recent Labs   Lab 06/09/24  0629         K 3.2*      CO2 25   BUN 7   CREATININE 0.9   CALCIUM 8.5*     CBC:   Recent Labs   Lab 06/08/24  0626 06/09/24  0629   WBC 12.33 10.77   HGB 12.1 11.7*   HCT 36.5* 34.9*    249     Magnesium: No results for input(s): "MG" in the last 48 hours.    Significant Imaging: I have reviewed all pertinent imaging results/findings within the past 24 hours.    Assessment/Plan:      * UTI (urinary tract infection)  UA significant for UTI    Plan:  Urine culture pending  IV ceftriaxone daily " for 3-5 day total course, transition to oral agents when sensitivities arrive  Keep on IV antibiotics, follow up urine culture  Urine culture gram negative rods - await sensitivity       Adrenal adenoma  Incidental finding on CT scan  Likely benign    Plan:  Continue to monitor outpatient      Nephrolithiasis  CT abdominal pelvis significant for obstructing renal stone on left side, bilateral nonobstructing stones    Plan:  Consult Urology for possible lithotripsy    Type 2 diabetes mellitus, without long-term current use of insulin  Patient states that she was diagnosed with diabetes over the past year with a provider in [place]    Plan:  Order hemoglobin A1c  Consider sliding scale if blood glucose is greatly above 180      Hydronephrosis  Left-sided hydronephrosis noted    Plan:  Consult Urology for possible PCN due to obstructing stone        VTE Risk Mitigation (From admission, onward)           Ordered     IP VTE LOW RISK PATIENT  Once         06/07/24 0459     Place sequential compression device  Until discontinued         06/07/24 0459                    Discharge Planning   PIERO:      Code Status: Full Code   Is the patient medically ready for discharge?:     Reason for patient still in hospital (select all that apply): Patient unstable  Discharge Plan A: Home                  Freddy Yu MD  Department of Hospital Medicine   [facility]

## 2024-06-10 VITALS
HEART RATE: 67 BPM | HEIGHT: 69 IN | TEMPERATURE: 98 F | RESPIRATION RATE: 20 BRPM | BODY MASS INDEX: 33.77 KG/M2 | OXYGEN SATURATION: 97 % | WEIGHT: 228 LBS | DIASTOLIC BLOOD PRESSURE: 71 MMHG | SYSTOLIC BLOOD PRESSURE: 124 MMHG

## 2024-06-10 LAB
ANION GAP SERPL CALC-SCNC: 14 MMOL/L (ref 8–16)
BASOPHILS # BLD AUTO: 0.04 K/UL (ref 0–0.2)
BASOPHILS NFR BLD: 0.5 % (ref 0–1.9)
BUN SERPL-MCNC: 8 MG/DL (ref 6–20)
CALCIUM SERPL-MCNC: 9.1 MG/DL (ref 8.7–10.5)
CHLORIDE SERPL-SCNC: 105 MMOL/L (ref 95–110)
CO2 SERPL-SCNC: 25 MMOL/L (ref 23–29)
CREAT SERPL-MCNC: 0.9 MG/DL (ref 0.5–1.4)
DIFFERENTIAL METHOD BLD: ABNORMAL
EOSINOPHIL # BLD AUTO: 0.3 K/UL (ref 0–0.5)
EOSINOPHIL NFR BLD: 4.5 % (ref 0–8)
ERYTHROCYTE [DISTWIDTH] IN BLOOD BY AUTOMATED COUNT: 15 % (ref 11.5–14.5)
EST. GFR  (NO RACE VARIABLE): >60 ML/MIN/1.73 M^2
GLUCOSE SERPL-MCNC: 83 MG/DL (ref 70–110)
HCT VFR BLD AUTO: 39.7 % (ref 37–48.5)
HGB BLD-MCNC: 12.8 G/DL (ref 12–16)
IMM GRANULOCYTES # BLD AUTO: 0.02 K/UL (ref 0–0.04)
IMM GRANULOCYTES NFR BLD AUTO: 0.3 % (ref 0–0.5)
LYMPHOCYTES # BLD AUTO: 2.4 K/UL (ref 1–4.8)
LYMPHOCYTES NFR BLD: 32.4 % (ref 18–48)
MCH RBC QN AUTO: 28 PG (ref 27–31)
MCHC RBC AUTO-ENTMCNC: 32.2 G/DL (ref 32–36)
MCV RBC AUTO: 87 FL (ref 82–98)
MONOCYTES # BLD AUTO: 0.6 K/UL (ref 0.3–1)
MONOCYTES NFR BLD: 8.4 % (ref 4–15)
NEUTROPHILS # BLD AUTO: 4 K/UL (ref 1.8–7.7)
NEUTROPHILS NFR BLD: 53.9 % (ref 38–73)
NRBC BLD-RTO: 0 /100 WBC
PLATELET # BLD AUTO: 267 K/UL (ref 150–450)
PMV BLD AUTO: 11.1 FL (ref 9.2–12.9)
POTASSIUM SERPL-SCNC: 3.5 MMOL/L (ref 3.5–5.1)
RBC # BLD AUTO: 4.57 M/UL (ref 4–5.4)
SODIUM SERPL-SCNC: 144 MMOL/L (ref 136–145)
WBC # BLD AUTO: 7.48 K/UL (ref 3.9–12.7)

## 2024-06-10 PROCEDURE — 25000003 PHARM REV CODE 250: Performed by: UROLOGY

## 2024-06-10 PROCEDURE — 36415 COLL VENOUS BLD VENIPUNCTURE: CPT | Performed by: UROLOGY

## 2024-06-10 PROCEDURE — 85025 COMPLETE CBC W/AUTO DIFF WBC: CPT | Performed by: UROLOGY

## 2024-06-10 PROCEDURE — 99232 SBSQ HOSP IP/OBS MODERATE 35: CPT | Mod: ,,, | Performed by: UROLOGY

## 2024-06-10 PROCEDURE — S4991 NICOTINE PATCH NONLEGEND: HCPCS | Performed by: INTERNAL MEDICINE

## 2024-06-10 PROCEDURE — 25000003 PHARM REV CODE 250: Performed by: INTERNAL MEDICINE

## 2024-06-10 PROCEDURE — 80048 BASIC METABOLIC PNL TOTAL CA: CPT | Performed by: UROLOGY

## 2024-06-10 RX ORDER — OXYBUTYNIN CHLORIDE 5 MG/1
5 TABLET ORAL 3 TIMES DAILY
Qty: 90 TABLET | Refills: 11 | Status: SHIPPED | OUTPATIENT
Start: 2024-06-10 | End: 2025-06-10

## 2024-06-10 RX ORDER — CIPROFLOXACIN 500 MG/1
500 TABLET ORAL EVERY 12 HOURS
Status: DISCONTINUED | OUTPATIENT
Start: 2024-06-10 | End: 2024-06-10 | Stop reason: HOSPADM

## 2024-06-10 RX ORDER — PHENAZOPYRIDINE HYDROCHLORIDE 100 MG/1
100 TABLET, FILM COATED ORAL 3 TIMES DAILY PRN
Qty: 5 TABLET | Refills: 0 | Status: SHIPPED | OUTPATIENT
Start: 2024-06-10 | End: 2024-06-20

## 2024-06-10 RX ORDER — CIPROFLOXACIN 500 MG/1
500 TABLET ORAL EVERY 12 HOURS
Qty: 14 TABLET | Refills: 0 | Status: SHIPPED | OUTPATIENT
Start: 2024-06-10 | End: 2024-06-17

## 2024-06-10 RX ORDER — KETOROLAC TROMETHAMINE 10 MG/1
10 TABLET, FILM COATED ORAL EVERY 6 HOURS PRN
Qty: 20 TABLET | Refills: 0 | Status: SHIPPED | OUTPATIENT
Start: 2024-06-10 | End: 2024-06-15

## 2024-06-10 RX ORDER — IBUPROFEN 200 MG
1 TABLET ORAL DAILY
Qty: 30 PATCH | Refills: 0 | Status: SHIPPED | OUTPATIENT
Start: 2024-06-10 | End: 2024-07-10

## 2024-06-10 RX ADMIN — HYDROCODONE BITARTRATE AND ACETAMINOPHEN 1 TABLET: 5; 325 TABLET ORAL at 11:06

## 2024-06-10 RX ADMIN — SUCRALFATE 1 G: 1 SUSPENSION ORAL at 11:06

## 2024-06-10 RX ADMIN — ALUMINUM HYDROXIDE, MAGNESIUM HYDROXIDE, AND SIMETHICONE 30 ML: 1200; 120; 1200 SUSPENSION ORAL at 06:06

## 2024-06-10 RX ADMIN — NICOTINE 1 PATCH: 21 PATCH, EXTENDED RELEASE TRANSDERMAL at 08:06

## 2024-06-10 RX ADMIN — SUCRALFATE 1 G: 1 SUSPENSION ORAL at 05:06

## 2024-06-10 RX ADMIN — ALUMINUM HYDROXIDE, MAGNESIUM HYDROXIDE, AND SIMETHICONE 30 ML: 1200; 120; 1200 SUSPENSION ORAL at 11:06

## 2024-06-10 RX ADMIN — HYDROCODONE BITARTRATE AND ACETAMINOPHEN 1 TABLET: 5; 325 TABLET ORAL at 04:06

## 2024-06-10 RX ADMIN — CIPROFLOXACIN HYDROCHLORIDE 500 MG: 500 TABLET, FILM COATED ORAL at 11:06

## 2024-06-10 RX ADMIN — ACETAMINOPHEN 650 MG: 325 TABLET ORAL at 08:06

## 2024-06-10 NOTE — PLAN OF CARE
VN reviewed discharge instructions with pt. Using teach back method.  AVS printed and handed to pt by bedside nurse.  Reviewed follow-up appointments, medications, diet, and importance of medication compliance.  Reviewed home care instructions, treatment plan, self-management, and when to seek medical attention.  Allowed time for questions.  All questions answered.  Patient verbalized complete understanding of discharge instructions and voices no concerns.     Discharge instructions complete.  Bedside delivery done.  Pt waiting on transport.  Bedside nurse notified.

## 2024-06-10 NOTE — DISCHARGE SUMMARY
[facility] Medicine  Discharge Summary      Patient Name: Josselyn Tarango  MRN: [id number]  [facility]: [id number]  Patient Class: IP- Inpatient  Admission Date: 6/7/2024  Hospital Length of Stay: 3 days  Discharge Date and Time:  06/10/2024 11:51 AM  Attending Physician: Freddy Yu MD   Discharging Provider: Freddy Yu MD  Primary Care Provider: No primary care provider on file.    Primary Care Team: Networked reference to record PCT     HPI:   Josselyn Tarango is a 58-year-old female with a past medical history of stomach ulcer, diabetes who presents with abdominal pain diarrhea.    Patient states that over the past few days she has had several loose nonbloody non mucous bowel movements as well as left lower abdominal pain.  Due to worsening pain she decided to call the ambulance, where she noticed that the lower abdominal pain also radiated to the back.    Triage vitals were remarkable for elevated blood pressures.  Review of systems significant for abdominal pain and diarrhea.  Physical exam significant for some tenderness to palpation in the lower left abdominal and flank pain area.    CBC significant for leukocytosis.  CMP significant for elevated sugars.    UA significant for UTI, with several WBCs, nitrates, leukocyte esterase.  Urine culture pending.    CT abdomen and pelvis was concerning for obstructing calculus in the distal left ureter with moderate hydronephrosis, bilateral nonobstructing calculi, left adrenal adenoma, diverticulosis.    Patient was given dose of IV Rocephin.    Patient admitted for UTI, obstructing nephrolithiasis, and hydronephrosis management.  Urology consultation in the a.m.    Procedure(s) (LRB):  CYSTOSCOPY, left retrograde pyelogram, left JJ stent (Left)  PYELOGRAM, RETROGRADE (N/A)      Hospital Course:   No notes on file     Goals of Care Treatment Preferences:  Code Status: Full Code      Consults:   Consults (From admission, onward)          Status  Ordering Provider     Inpatient consult to Urology  Once        Provider:  (Not yet assigned)    Completed IOANA PRUETT            Renal/  * UTI (urinary tract infection)  UA significant for UTI    Plan:  Urine culture pending  IV ceftriaxone daily for 3-5 day total course, transition to oral agents when sensitivities arrive  Keep on IV antibiotics, follow up urine culture  Urine culture gram negative rods - await sensitivity   E coli pan sensitive - dc on Cipro for 7 days   DC on Toradol , Pyridium , Oxybutin         Final Active Diagnoses:    Diagnosis Date Noted POA    PRINCIPAL PROBLEM:  UTI (urinary tract infection) [N39.0] 06/07/2024 Yes     Chronic    Hydronephrosis [N13.30] 06/07/2024 Yes    Type 2 diabetes mellitus, without long-term current use of insulin [E11.9] 06/07/2024 Yes    Nephrolithiasis [N20.0] 06/07/2024 Yes     Chronic    Adrenal adenoma [D35.00] 06/07/2024 Yes    Left ureteral stone [N20.1] 06/07/2024 Yes      Problems Resolved During this Admission:       Discharged Condition: good    Disposition: Home or Self Care    Follow Up:   Follow-up Information       PCP Follow up.    Why: Requested             Surgery Follow Up Follow up.    Why: Requested.             [facility]. Go on 6/19/2024.    Why: Patient has PCP follow up  at [facility] with Dr. Rachel Montana on 6/19/24 at 1:00pm  NOTE:  Patient needs to bring in Discharge Summary, Medicine bottles, and Photo ID.  Contact information:  [employer] [address] [address]  PH: [phone]                         Patient Instructions:      Ambulatory referral/consult to Smoking Cessation Program   Standing Status: Future   Referral Priority: Routine Referral Type: Consultation   Referral Reason: Specialty Services Required   Requested Specialty: CTTS   Number of Visits Requested: 1     Diet Adult Regular       Significant Diagnostic Studies: Labs: BMP:   Recent Labs   Lab 06/09/24  0629 06/10/24  0505   GLU  "107 83    144   K 3.2* 3.5    105   CO2 25 25   BUN 7 8   CREATININE 0.9 0.9   CALCIUM 8.5* 9.1   , CMP   Recent Labs   Lab 06/09/24  0629 06/10/24  0505    144   K 3.2* 3.5    105   CO2 25 25    83   BUN 7 8   CREATININE 0.9 0.9   CALCIUM 8.5* 9.1   ANIONGAP 11 14   , CBC   Recent Labs   Lab 06/09/24  0629 06/10/24  0505   WBC 10.77 7.48   HGB 11.7* 12.8   HCT 34.9* 39.7    267   , INR No results found for: "INR", "PROTIME", Lipid Panel No results found for: "CHOL", "HDL", "LDLCALC", "TRIG", "CHOLHDL", and Troponin No results for input(s): "TROPONINI" in the last 168 hours.  Microbiology: Urine Culture    Lab Results   Component Value Date    LABURIN No growth 06/07/2024       Pending Diagnostic Studies:       None           Medications:  Reconciled Home Medications:      Medication List        START taking these medications      ciprofloxacin HCl 500 MG tablet  Commonly known as: CIPRO  Take 1 tablet (500 mg total) by mouth every 12 (twelve) hours. for 7 days     ketorolac 10 mg tablet  Commonly known as: TORADOL  Take 1 tablet (10 mg total) by mouth every 6 (six) hours as needed for Pain.     nicotine 21 mg/24 hr  Commonly known as: NICODERM CQ  Place 1 patch onto the skin once daily.     oxybutynin 5 MG Tab  Commonly known as: DITROPAN  Take 1 tablet (5 mg total) by mouth 3 (three) times daily.     phenazopyridine 100 MG tablet  Commonly known as: PYRIDIUM  Take 1 tablet (100 mg total) by mouth 3 (three) times daily as needed for Pain.              Indwelling Lines/Drains at time of discharge:   Lines/Drains/Airways       None                   Time spent on the discharge of patient: 50 minutes         Freddy Yu MD  Department of Hospital Medicine  [facility]  "

## 2024-06-10 NOTE — H&P (VIEW-ONLY)
[facility]  Urology  Progress Note    Patient Name: Josselyn Tarango  MRN: [id number]  Admission Date: 6/7/2024  Hospital Length of Stay: 3 days  Code Status: Full Code   Attending Provider: Freddy Yu MD   Primary Care Physician: No primary care provider on file.    Subjective:     HPI:  No notes on file    Interval History: NAOE. AFVSS. Some dysuria at termination of voiding. Overall felling well.    Review of Systems   Constitutional:  Negative for chills and fever.   Genitourinary:  Positive for hematuria. Negative for dysuria and frequency.     Objective:     Temp:  [97.7 °F (36.5 °C)-98.9 °F (37.2 °C)] 97.8 °F (36.6 °C)  Pulse:  [60-74] 60  Resp:  [18-20] 18  SpO2:  [94 %-96 %] 96 %  BP: (114-136)/(56-67) 136/67     Body mass index is 33.67 kg/m².           Drains       None                    Physical Exam  Constitutional:       General: She is not in acute distress.     Appearance: Normal appearance.   HENT:      Head: Normocephalic.   Eyes:      Pupils: Pupils are equal, round, and reactive to light.   Cardiovascular:      Rate and Rhythm: Normal rate.   Pulmonary:      Effort: Pulmonary effort is normal. No respiratory distress.   Chest:      Chest wall: No tenderness.   Abdominal:      General: Abdomen is flat. There is no distension.      Palpations: Abdomen is soft.      Tenderness: There is no abdominal tenderness. There is no right CVA tenderness or left CVA tenderness.   Musculoskeletal:         General: Normal range of motion.      Cervical back: Normal range of motion.   Neurological:      General: No focal deficit present.      Mental Status: She is alert and oriented to person, place, and time.   Psychiatric:         Mood and Affect: Mood normal.         Behavior: Behavior normal.           Significant Labs:    BMP:  Recent Labs   Lab 06/08/24  0626 06/09/24  0629 06/10/24  0505    140 144   K 3.3* 3.2* 3.5    104 105   CO2 23 25 25   BUN 8 7 8   CREATININE 1.0 0.9 0.9    CALCIUM 8.5* 8.5* 9.1       CBC:   Recent Labs   Lab 06/08/24  0626 06/09/24  0629 06/10/24  0505   WBC 12.33 10.77 7.48   HGB 12.1 11.7* 12.8   HCT 36.5* 34.9* 39.7    249 267       All pertinent labs results from the past 24 hours have been reviewed.    Significant Imaging:  All pertinent imaging results/findings from the past 24 hours have been reviewed.                  Assessment/Plan:     Left ureteral stone  -- Planned procedure 6/17/24 for definitive stone management.  -- Urine culture growing pan-sensitive proteus. Discharge on oral antibiotics through 6/17/24   -- Recommend dc with pyridium 200 mg TID PRN dysuria for 3 days #6, oxybutynin 5 mg TID PRN stent pain for 5 days #15, toradol 10 mg q6h PRN pain, a few rescue tablets of oxycodone 5 mg for breakthrough.    -- call with questions  -- Okay for discharge        VTE Risk Mitigation (From admission, onward)           Ordered     IP VTE LOW RISK PATIENT  Once         06/07/24 0459     Place sequential compression device  Until discontinued         06/07/24 0459                    Regis Polk MD  Urology  [facility]

## 2024-06-10 NOTE — ASSESSMENT & PLAN NOTE
-- Planned procedure 6/17/24 for definitive stone management.  -- Urine culture growing pan-sensitive proteus. Discharge on oral antibiotics through 6/17/24   -- Recommend dc with pyridium 200 mg TID PRN dysuria for 3 days #6, oxybutynin 5 mg TID PRN stent pain for 5 days #15, toradol 10 mg q6h PRN pain, a few rescue tablets of oxycodone 5 mg for breakthrough.    -- call with questions  -- Okay for discharge

## 2024-06-10 NOTE — ASSESSMENT & PLAN NOTE
UA significant for UTI    Plan:  Urine culture pending  IV ceftriaxone daily for 3-5 day total course, transition to oral agents when sensitivities arrive  Keep on IV antibiotics, follow up urine culture  Urine culture gram negative rods - await sensitivity   E coli pan sensitive - dc on Cipro for 7 days   DC on Toradol , Pyridium , Oxybutin

## 2024-06-10 NOTE — PLAN OF CARE
Pt will dc with no needs noted. Pt will require transport home. Pt that her roommate will be home upon arrive. Pt aware to call her roommate to let them know to be at home upon dc. SW will contact pt to follow pt throughout her transitions of care and assist with any dc needs.     Confirmed address:    [address] [place] [id number]     Transportation:SW setup [facility] (Ms. KYLE)wheelchair transport for now.   Facesheet: Transport facesheet at bedside.     SW requested PCP follow up.     Cleared from  . Bedside Nurse and VN notified.    SCHED PCP at [facility] w/Dr Rachel Montana on 6/19 at 1:00  NOTE:  Patient needs to bring in Discharge Summary, Medicine bottles, and Photo ID.  [facility] - [address] [address]  PH: [phone]    Cleared from  . Bedside Nurse and VN notified.     06/10/24 0926   Final Note   Assessment Type Final Discharge Note   Anticipated Discharge Disposition Home   Hospital Resources/Appts/Education Provided Appointments scheduled and added to AVS   Post-Acute Status   Discharge Delays None known at this time

## 2024-06-10 NOTE — SUBJECTIVE & OBJECTIVE
Interval History: NAOE. AFVSS. Some dysuria at termination of voiding. Overall felling well.    Review of Systems   Constitutional:  Negative for chills and fever.   Genitourinary:  Positive for hematuria. Negative for dysuria and frequency.     Objective:     Temp:  [97.7 °F (36.5 °C)-98.9 °F (37.2 °C)] 97.8 °F (36.6 °C)  Pulse:  [60-74] 60  Resp:  [18-20] 18  SpO2:  [94 %-96 %] 96 %  BP: (114-136)/(56-67) 136/67     Body mass index is 33.67 kg/m².           Drains       None                    Physical Exam  Constitutional:       General: She is not in acute distress.     Appearance: Normal appearance.   HENT:      Head: Normocephalic.   Eyes:      Pupils: Pupils are equal, round, and reactive to light.   Cardiovascular:      Rate and Rhythm: Normal rate.   Pulmonary:      Effort: Pulmonary effort is normal. No respiratory distress.   Chest:      Chest wall: No tenderness.   Abdominal:      General: Abdomen is flat. There is no distension.      Palpations: Abdomen is soft.      Tenderness: There is no abdominal tenderness. There is no right CVA tenderness or left CVA tenderness.   Musculoskeletal:         General: Normal range of motion.      Cervical back: Normal range of motion.   Neurological:      General: No focal deficit present.      Mental Status: She is alert and oriented to person, place, and time.   Psychiatric:         Mood and Affect: Mood normal.         Behavior: Behavior normal.           Significant Labs:    BMP:  Recent Labs   Lab 06/08/24  0626 06/09/24  0629 06/10/24  0505    140 144   K 3.3* 3.2* 3.5    104 105   CO2 23 25 25   BUN 8 7 8   CREATININE 1.0 0.9 0.9   CALCIUM 8.5* 8.5* 9.1       CBC:   Recent Labs   Lab 06/08/24  0626 06/09/24  0629 06/10/24  0505   WBC 12.33 10.77 7.48   HGB 12.1 11.7* 12.8   HCT 36.5* 34.9* 39.7    249 267       All pertinent labs results from the past 24 hours have been reviewed.    Significant Imaging:  All pertinent imaging results/findings  from the past 24 hours have been reviewed.

## 2024-06-10 NOTE — PROGRESS NOTES
[facility]  Urology  Progress Note    Patient Name: Josselyn Tarango  MRN: [id number]  Admission Date: 6/7/2024  Hospital Length of Stay: 3 days  Code Status: Full Code   Attending Provider: Freddy Yu MD   Primary Care Physician: No primary care provider on file.    Subjective:     HPI:  No notes on file    Interval History: NAOE. AFVSS. Some dysuria at termination of voiding. Overall felling well.    Review of Systems   Constitutional:  Negative for chills and fever.   Genitourinary:  Positive for hematuria. Negative for dysuria and frequency.     Objective:     Temp:  [97.7 °F (36.5 °C)-98.9 °F (37.2 °C)] 97.8 °F (36.6 °C)  Pulse:  [60-74] 60  Resp:  [18-20] 18  SpO2:  [94 %-96 %] 96 %  BP: (114-136)/(56-67) 136/67     Body mass index is 33.67 kg/m².           Drains       None                    Physical Exam  Constitutional:       General: She is not in acute distress.     Appearance: Normal appearance.   HENT:      Head: Normocephalic.   Eyes:      Pupils: Pupils are equal, round, and reactive to light.   Cardiovascular:      Rate and Rhythm: Normal rate.   Pulmonary:      Effort: Pulmonary effort is normal. No respiratory distress.   Chest:      Chest wall: No tenderness.   Abdominal:      General: Abdomen is flat. There is no distension.      Palpations: Abdomen is soft.      Tenderness: There is no abdominal tenderness. There is no right CVA tenderness or left CVA tenderness.   Musculoskeletal:         General: Normal range of motion.      Cervical back: Normal range of motion.   Neurological:      General: No focal deficit present.      Mental Status: She is alert and oriented to person, place, and time.   Psychiatric:         Mood and Affect: Mood normal.         Behavior: Behavior normal.           Significant Labs:    BMP:  Recent Labs   Lab 06/08/24  0626 06/09/24  0629 06/10/24  0505    140 144   K 3.3* 3.2* 3.5    104 105   CO2 23 25 25   BUN 8 7 8   CREATININE 1.0 0.9 0.9    CALCIUM 8.5* 8.5* 9.1       CBC:   Recent Labs   Lab 06/08/24  0626 06/09/24  0629 06/10/24  0505   WBC 12.33 10.77 7.48   HGB 12.1 11.7* 12.8   HCT 36.5* 34.9* 39.7    249 267       All pertinent labs results from the past 24 hours have been reviewed.    Significant Imaging:  All pertinent imaging results/findings from the past 24 hours have been reviewed.                  Assessment/Plan:     Left ureteral stone  -- Planned procedure 6/17/24 for definitive stone management.  -- Urine culture growing pan-sensitive proteus. Discharge on oral antibiotics through 6/17/24   -- Recommend dc with pyridium 200 mg TID PRN dysuria for 3 days #6, oxybutynin 5 mg TID PRN stent pain for 5 days #15, toradol 10 mg q6h PRN pain, a few rescue tablets of oxycodone 5 mg for breakthrough.    -- call with questions  -- Okay for discharge        VTE Risk Mitigation (From admission, onward)           Ordered     IP VTE LOW RISK PATIENT  Once         06/07/24 0459     Place sequential compression device  Until discontinued         06/07/24 0459                    Regis Polk MD  Urology  [facility]

## 2024-06-11 LAB
BACTERIA UR CULT: ABNORMAL
BACTERIA UR CULT: ABNORMAL

## 2024-06-14 ENCOUNTER — TELEPHONE (OUTPATIENT)
Dept: UROLOGY | Facility: CLINIC | Age: 59
End: 2024-06-14
Payer: MEDICAID

## 2024-06-14 NOTE — TELEPHONE ENCOUNTER
----- Message from Humza Saenz MD sent at 6/14/2024  1:34 PM CDT -----  Regarding: RE: Questions and concerns  Contact: [phone]  Not sure what we can do here... can she get uber or taxi to hospital and have someone take her home?  ----- Message -----  From: Jose Cruz Velasco MA  Sent: 6/14/2024  12:22 PM CDT  To: Humza Saenz MD  Subject: FW: Questions and concerns                         ----- Message -----  From: Deanna Kim  Sent: 6/14/2024  11:40 AM CDT  To: Letty Ching Staff  Subject: Questions and concerns                           Type:  Needs Medical Advice    Who Called: Josselyn  Would the patient rather a call back or a response via [facility]? Call  Best Call Back Number:  [phone]  Additional Information: Patient is calling in ref to her procedure scheduled on Monday. Patient doesn't have transportation. Would like to speak to the office in ref to transportation.

## 2024-06-17 ENCOUNTER — HOSPITAL ENCOUNTER (OUTPATIENT)
Facility: HOSPITAL | Age: 59
Discharge: HOME | End: 2024-06-17
Attending: UROLOGY | Admitting: UROLOGY
Payer: MEDICAID

## 2024-06-17 ENCOUNTER — ANESTHESIA (OUTPATIENT)
Dept: SURGERY | Facility: HOSPITAL | Age: 59
End: 2024-06-17
Payer: MEDICAID

## 2024-06-17 ENCOUNTER — ANESTHESIA EVENT (OUTPATIENT)
Dept: SURGERY | Facility: HOSPITAL | Age: 59
End: 2024-06-17
Payer: MEDICAID

## 2024-06-17 VITALS
HEART RATE: 64 BPM | DIASTOLIC BLOOD PRESSURE: 74 MMHG | BODY MASS INDEX: 34.56 KG/M2 | WEIGHT: 228 LBS | TEMPERATURE: 97 F | OXYGEN SATURATION: 97 % | SYSTOLIC BLOOD PRESSURE: 164 MMHG | RESPIRATION RATE: 16 BRPM | HEIGHT: 68 IN

## 2024-06-17 DIAGNOSIS — N20.1 LEFT URETERAL STONE: ICD-10-CM

## 2024-06-17 DIAGNOSIS — N20.1 URETERAL STONE: Primary | ICD-10-CM

## 2024-06-17 LAB
POCT GLUCOSE: 101 MG/DL (ref 70–110)
SPECIMEN SOURCE: NORMAL

## 2024-06-17 PROCEDURE — C1747 OPTIME ENDOSCOPE, SINGLE, URINARY TRACT: HCPCS | Performed by: UROLOGY

## 2024-06-17 PROCEDURE — 63600175 PHARM REV CODE 636 W HCPCS: Performed by: ANESTHESIOLOGY

## 2024-06-17 PROCEDURE — 63600175 PHARM REV CODE 636 W HCPCS: Performed by: NURSE ANESTHETIST, CERTIFIED REGISTERED

## 2024-06-17 PROCEDURE — 25000003 PHARM REV CODE 250: Performed by: ANESTHESIOLOGY

## 2024-06-17 PROCEDURE — 36000706: Performed by: UROLOGY

## 2024-06-17 PROCEDURE — 82365 CALCULUS SPECTROSCOPY: CPT | Performed by: UROLOGY

## 2024-06-17 PROCEDURE — C1769 GUIDE WIRE: HCPCS | Performed by: UROLOGY

## 2024-06-17 PROCEDURE — 71000015 HC POSTOP RECOV 1ST HR: Performed by: UROLOGY

## 2024-06-17 PROCEDURE — C1758 CATHETER, URETERAL: HCPCS | Performed by: UROLOGY

## 2024-06-17 PROCEDURE — 36000707: Performed by: UROLOGY

## 2024-06-17 PROCEDURE — 71000033 HC RECOVERY, INTIAL HOUR: Performed by: UROLOGY

## 2024-06-17 PROCEDURE — 37000008 HC ANESTHESIA 1ST 15 MINUTES: Performed by: UROLOGY

## 2024-06-17 PROCEDURE — 52356 CYSTO/URETERO W/LITHOTRIPSY: CPT | Mod: LT,,, | Performed by: UROLOGY

## 2024-06-17 PROCEDURE — 71000016 HC POSTOP RECOV ADDL HR: Performed by: UROLOGY

## 2024-06-17 PROCEDURE — 25000003 PHARM REV CODE 250: Performed by: NURSE ANESTHETIST, CERTIFIED REGISTERED

## 2024-06-17 PROCEDURE — C1773 RET DEV, INSERTABLE: HCPCS | Performed by: UROLOGY

## 2024-06-17 PROCEDURE — 74420 UROGRAPHY RTRGR +-KUB: CPT | Mod: 26,,, | Performed by: UROLOGY

## 2024-06-17 PROCEDURE — 63600175 PHARM REV CODE 636 W HCPCS: Performed by: STUDENT IN AN ORGANIZED HEALTH CARE EDUCATION/TRAINING PROGRAM

## 2024-06-17 PROCEDURE — 25500020 PHARM REV CODE 255: Performed by: UROLOGY

## 2024-06-17 PROCEDURE — 37000009 HC ANESTHESIA EA ADD 15 MINS: Performed by: UROLOGY

## 2024-06-17 PROCEDURE — C2617 STENT, NON-COR, TEM W/O DEL: HCPCS | Performed by: UROLOGY

## 2024-06-17 DEVICE — URETERAL STENT
Type: IMPLANTABLE DEVICE | Site: URETER | Status: FUNCTIONAL
Brand: POLARIS™ ULTRA

## 2024-06-17 RX ORDER — LIDOCAINE HYDROCHLORIDE 20 MG/ML
INJECTION INTRAVENOUS
Status: DISCONTINUED | OUTPATIENT
Start: 2024-06-17 | End: 2024-06-17

## 2024-06-17 RX ORDER — PROPOFOL 10 MG/ML
INJECTION, EMULSION INTRAVENOUS
Status: DISCONTINUED | OUTPATIENT
Start: 2024-06-17 | End: 2024-06-17

## 2024-06-17 RX ORDER — PHENAZOPYRIDINE HYDROCHLORIDE 100 MG/1
100 TABLET, FILM COATED ORAL 3 TIMES DAILY PRN
Qty: 30 TABLET | Refills: 0 | Status: SHIPPED | OUTPATIENT
Start: 2024-06-17 | End: 2024-06-27

## 2024-06-17 RX ORDER — CEFAZOLIN SODIUM 2 G/50ML
2 SOLUTION INTRAVENOUS
Status: COMPLETED | OUTPATIENT
Start: 2024-06-17 | End: 2024-06-17

## 2024-06-17 RX ORDER — FENTANYL CITRATE 50 UG/ML
INJECTION, SOLUTION INTRAMUSCULAR; INTRAVENOUS
Status: DISCONTINUED | OUTPATIENT
Start: 2024-06-17 | End: 2024-06-17

## 2024-06-17 RX ORDER — ONDANSETRON HYDROCHLORIDE 2 MG/ML
INJECTION, SOLUTION INTRAVENOUS
Status: DISCONTINUED | OUTPATIENT
Start: 2024-06-17 | End: 2024-06-17

## 2024-06-17 RX ORDER — HYDROMORPHONE HYDROCHLORIDE 2 MG/ML
0.2 INJECTION, SOLUTION INTRAMUSCULAR; INTRAVENOUS; SUBCUTANEOUS EVERY 5 MIN PRN
Status: DISCONTINUED | OUTPATIENT
Start: 2024-06-17 | End: 2024-06-17 | Stop reason: HOSPADM

## 2024-06-17 RX ORDER — OXYCODONE HYDROCHLORIDE 5 MG/1
5 TABLET ORAL
Status: DISCONTINUED | OUTPATIENT
Start: 2024-06-17 | End: 2024-06-17 | Stop reason: HOSPADM

## 2024-06-17 RX ORDER — OXYBUTYNIN CHLORIDE 5 MG/1
5 TABLET ORAL 3 TIMES DAILY PRN
Qty: 30 TABLET | Refills: 0 | Status: SHIPPED | OUTPATIENT
Start: 2024-06-17 | End: 2024-06-27

## 2024-06-17 RX ORDER — SODIUM CHLORIDE, SODIUM LACTATE, POTASSIUM CHLORIDE, CALCIUM CHLORIDE 600; 310; 30; 20 MG/100ML; MG/100ML; MG/100ML; MG/100ML
INJECTION, SOLUTION INTRAVENOUS CONTINUOUS PRN
Status: DISCONTINUED | OUTPATIENT
Start: 2024-06-17 | End: 2024-06-17

## 2024-06-17 RX ORDER — PROCHLORPERAZINE EDISYLATE 5 MG/ML
5 INJECTION INTRAMUSCULAR; INTRAVENOUS EVERY 30 MIN PRN
Status: DISCONTINUED | OUTPATIENT
Start: 2024-06-17 | End: 2024-06-17 | Stop reason: HOSPADM

## 2024-06-17 RX ORDER — KETOROLAC TROMETHAMINE 10 MG/1
10 TABLET, FILM COATED ORAL EVERY 6 HOURS
Qty: 20 TABLET | Refills: 0 | Status: SHIPPED | OUTPATIENT
Start: 2024-06-17 | End: 2024-06-22

## 2024-06-17 RX ORDER — ACETAMINOPHEN 10 MG/ML
INJECTION, SOLUTION INTRAVENOUS
Status: DISCONTINUED | OUTPATIENT
Start: 2024-06-17 | End: 2024-06-17

## 2024-06-17 RX ORDER — OXYCODONE AND ACETAMINOPHEN 5; 325 MG/1; MG/1
1 TABLET ORAL EVERY 4 HOURS PRN
Qty: 5 EACH | Refills: 0 | Status: SHIPPED | OUTPATIENT
Start: 2024-06-17

## 2024-06-17 RX ORDER — TAMSULOSIN HYDROCHLORIDE 0.4 MG/1
0.4 CAPSULE ORAL DAILY
Qty: 10 CAPSULE | Refills: 0 | Status: SHIPPED | OUTPATIENT
Start: 2024-06-17 | End: 2024-06-27

## 2024-06-17 RX ORDER — DEXAMETHASONE SODIUM PHOSPHATE 4 MG/ML
INJECTION, SOLUTION INTRA-ARTICULAR; INTRALESIONAL; INTRAMUSCULAR; INTRAVENOUS; SOFT TISSUE
Status: DISCONTINUED | OUTPATIENT
Start: 2024-06-17 | End: 2024-06-17

## 2024-06-17 RX ORDER — MIDAZOLAM HYDROCHLORIDE 1 MG/ML
INJECTION INTRAMUSCULAR; INTRAVENOUS
Status: DISCONTINUED | OUTPATIENT
Start: 2024-06-17 | End: 2024-06-17

## 2024-06-17 RX ORDER — SODIUM CHLORIDE 0.9 % (FLUSH) 0.9 %
10 SYRINGE (ML) INJECTION
Status: DISCONTINUED | OUTPATIENT
Start: 2024-06-17 | End: 2024-06-17 | Stop reason: HOSPADM

## 2024-06-17 RX ADMIN — ONDANSETRON 8 MG: 2 INJECTION, SOLUTION INTRAMUSCULAR; INTRAVENOUS at 01:06

## 2024-06-17 RX ADMIN — CEFAZOLIN SODIUM 2 G: 2 SOLUTION INTRAVENOUS at 01:06

## 2024-06-17 RX ADMIN — PROPOFOL 150 MG: 10 INJECTION, EMULSION INTRAVENOUS at 01:06

## 2024-06-17 RX ADMIN — HYPROMELLOSE 2910 2 DROP: 5 SOLUTION OPHTHALMIC at 01:06

## 2024-06-17 RX ADMIN — MIDAZOLAM HYDROCHLORIDE 2 MG: 1 INJECTION, SOLUTION INTRAMUSCULAR; INTRAVENOUS at 01:06

## 2024-06-17 RX ADMIN — FENTANYL CITRATE 50 MCG: 50 INJECTION INTRAMUSCULAR; INTRAVENOUS at 02:06

## 2024-06-17 RX ADMIN — ACETAMINOPHEN 1000 MG: 10 INJECTION, SOLUTION INTRAVENOUS at 01:06

## 2024-06-17 RX ADMIN — FENTANYL CITRATE 50 MCG: 50 INJECTION INTRAMUSCULAR; INTRAVENOUS at 01:06

## 2024-06-17 RX ADMIN — SODIUM CHLORIDE, SODIUM LACTATE, POTASSIUM CHLORIDE, AND CALCIUM CHLORIDE: .6; .31; .03; .02 INJECTION, SOLUTION INTRAVENOUS at 01:06

## 2024-06-17 RX ADMIN — HYDROMORPHONE HYDROCHLORIDE 0.2 MG: 2 INJECTION INTRAMUSCULAR; INTRAVENOUS; SUBCUTANEOUS at 03:06

## 2024-06-17 RX ADMIN — HYDROMORPHONE HYDROCHLORIDE 0.2 MG: 2 INJECTION INTRAMUSCULAR; INTRAVENOUS; SUBCUTANEOUS at 02:06

## 2024-06-17 RX ADMIN — DEXAMETHASONE SODIUM PHOSPHATE 4 MG: 4 INJECTION, SOLUTION INTRA-ARTICULAR; INTRALESIONAL; INTRAMUSCULAR; INTRAVENOUS; SOFT TISSUE at 01:06

## 2024-06-17 RX ADMIN — LIDOCAINE HYDROCHLORIDE 100 MG: 20 INJECTION, SOLUTION INTRAVENOUS at 01:06

## 2024-06-17 RX ADMIN — OXYCODONE HYDROCHLORIDE 5 MG: 5 TABLET ORAL at 02:06

## 2024-06-17 NOTE — ANESTHESIA PROCEDURE NOTES
Intubation    Date/Time: 6/17/2024 1:31 PM    Performed by: Aliyah Turcios CRNA  Authorized by: Aliyah Turcios CRNA    Intubation:     Induction:  Intravenous    Intubated:  Postinduction    Mask Ventilation:  N/a    Attempted By:  CRNA    Method of Intubation:  Other (see comments)    Difficult Airway Encountered?: No      Complications:  None    Airway Device:  Supraglottic airway/LMA    Airway Device Size:  4.0    Style/Cuff Inflation:  Cuffed    Inflation Amount (mL):  10    Secured at:  The lips    Placement Verified By:  Capnometry    Complicating Factors:  None    Findings Post-Intubation:  Atraumatic/condition of teeth unchanged

## 2024-06-17 NOTE — OP NOTE
[facility]  Operative Note    Date: 06/17/2024    Pre-Op Diagnosis: left ureteral stone    Patient Active Problem List    Diagnosis Date Noted    UTI (urinary tract infection) 06/07/2024    Hydronephrosis 06/07/2024    Type 2 diabetes mellitus, without long-term current use of insulin 06/07/2024    Nephrolithiasis 06/07/2024    Adrenal adenoma 06/07/2024    Left ureteral stone 06/07/2024       Post-Op Diagnosis: same    Procedure(s) Performed:   1. Left ureteroscopy  2. Laser lithotripsy  3. Stone extraction  4. Left stent exchange  5. Fluoro < 1 hr    Specimen(s): left ureteral stone fragments    Staff Surgeon: Humza Saenz MD    Assistant Surgeon: Regis Polk MD    Anesthesia: General LMA anesthesia    Indications: Josselyn Tarango is a 58 y.o. female with a left ureteral stone presenting for definitive stone management. She is pre-stented.    Findings:  -Left ureteral stone, 7mm, lasered and removed from the ureter  -Small mid pole stones, up to 5mm in size, left kidney dusted    Estimated Blood Loss: min    Drains:   1. Left 6 Fr x 26 cm JJ ureteral stent with strings    Procedure in detail:  After risks, benefits, and possible complications were explained, the patient elected to undergo the procedure and informed consent was obtained. All questions were answered in the marifer-operative area. The patient was transferred to the cystoscopy suite and placed in the supine position. SCDs were applied and working. Anesthesia was administered. The patient was then placed in the dorsal lithotomy position and prepped and draped in the usual sterile fashion. Time out was performed, and marifer-procedural antibiotics were confirmed.     A rigid cystoscope in a 22 Fr sheath was introduced into the patient's urethra. This passed easily. The entire urethra was visualized which showed no strictures or masses. Cystoscopy revealed the ureteral orifices in the normal anatomic location bilaterally.    The patient's  left ureteral stent was grasped with alligator graspers and brought to the meatus. A motion wire was passed through the stent and into the kidney with fluoroscopic confirmation. The stent was was removed keeping the wire in place.    An 8 Fr rigid ureteroscope was passed into the patient's bladder alongside the wire under direct vision. It was then passed through the left ureteral orifice alongside the wire. A stone was encountered at the level of the distal.  A 200 micron laser fiber was passed through the ureteroscope. The stone was fragmented using the laser. The laser fiber was removed and an NCircle basket was introduced through the ureteroscope. Stone fragments were removed and sent for analysis. The ureteroscope was reinserted and the entire length of the ureter was surveyed and no additional stone fragments were visualized. The ureteroscope was removed    An 8 Fr flexible ureteroscope was advanced next to the  wire to the level of the renal pelvis under continuous fluoroscopy. This passed easily.     Stones were encountered in midpole. The laser fiber was inserted per the scope and the stones were dusted. Systematic pyeloscopy was performed and all remaining stone fragments were deemed small enough to pass spontaneously, <1 mm. The scope was removed keeping the wire in place.      A 6 Fr x 26 cm JJ ureteral stent without strings was passed over the wire and up into the renal pelvis using fluoro. When the coil appeared to be in good position in the kidney and the radio-opaque marker of the pusher was at the inferior pubis, the wire was removed under continuous fluoro. Good coils were seen in the kidney and the bladder using fluoro.    Cystoscope reinserted and the bladder was drained.    The patient tolerated the procedure well and was transferred to the recovery room in stable condition.    Disposition:  The patient will be discharged home from PACU. She follow up with Dr. Saenz in 1 week for cysto stent  pull in office.      Regis Polk MD    I was present and scrubbed for the entirety of the procedure.  I agree with the operative note and have edited as needed.    Humza Saenz MD

## 2024-06-17 NOTE — TRANSFER OF CARE
"Anesthesia Transfer of Care Note    Patient: Josselyn Tarango    Procedure(s) Performed: Procedure(s) (LRB):  Cystoscopy, left retrograde pyelogram, left ureteroscopy with holmium laser lithotripsy, stone basket extraction, left JJ stent (Left)  PYELOGRAM, RETROGRADE (N/A)  EXTRACTION - STONE (Left)    Patient location: PACU    Anesthesia Type: general    Transport from OR: Transported from OR on 6-10 L/min O2 by face mask with adequate spontaneous ventilation    Post pain: adequate analgesia    Post assessment: no apparent anesthetic complications    Post vital signs: stable    Level of consciousness: awake    Nausea/Vomiting: no nausea/vomiting    Complications: none    Transfer of care protocol was followed      Last vitals: Visit Vitals  /74   Pulse 72   Temp 36.7 °C (98.1 °F) (Temporal)   Resp 16   Ht 5' 8" (1.727 m)   Wt 103.4 kg (228 lb)   SpO2 99%   Breastfeeding No   BMI 34.67 kg/m²     "

## 2024-06-17 NOTE — PLAN OF CARE
Patient requested to be taken to front of the hospital to wait for her ride who will be arriving shortly.  Patient A/O x 3.

## 2024-06-17 NOTE — ANESTHESIA POSTPROCEDURE EVALUATION
Anesthesia Post Evaluation    Patient: Josselyn Tarango    Procedure(s) Performed: Procedure(s) (LRB):  Cystoscopy, left retrograde pyelogram, left ureteroscopy with holmium laser lithotripsy, stone basket extraction, left JJ stent (Left)  PYELOGRAM, RETROGRADE (N/A)  EXTRACTION - STONE (Left)    Final Anesthesia Type: general      Patient location during evaluation: PACU  Patient participation: Yes- Able to Participate  Level of consciousness: awake and alert  Post-procedure vital signs: reviewed and stable  Pain management: adequate  Airway patency: patent    PONV status at discharge: No PONV  Anesthetic complications: no      Cardiovascular status: blood pressure returned to baseline and hemodynamically stable  Respiratory status: unassisted  Hydration status: euvolemic  Follow-up not needed.              Vitals Value Taken Time   /67 06/17/24 1521   Temp 36.9 °C (98.5 °F) 06/17/24 1430   Pulse 62 06/17/24 1525   Resp 16 06/17/24 1525   SpO2 96 % 06/17/24 1525   Vitals shown include unfiled device data.      Event Time   Out of Recovery 15:22:01         Pain/Barbra Score: Pain Rating Prior to Med Admin: 6 (6/17/2024  3:10 PM)  Barbra Score: 9 (6/17/2024  3:10 PM)

## 2024-06-17 NOTE — DISCHARGE INSTRUCTIONS
Post Cystoscopy Instructions  Do not strain to have a bowel movement  No strenuous exercise x 7 days  No driving while you are on narcotic pain medications or if your santana  catheter is in place    You can expect:  To pass stone fragments if you had a stone procedure  Have pain when you void from your stent if you have a stent in place  See blood in your urine if you have a stent in place    If you have a catheter, please return to the ER if your catheter stops draining or you are having abdominal pain.    Call the doctor if:  Temperature is greater than 101F  Persistent vomiting and inability to keep food down  Inability to void if you do not have a catheter

## 2024-06-17 NOTE — DISCHARGE SUMMARY
[facility] - Surgery (Hospital)  Discharge Note  Short Stay    Procedure(s) (LRB):  Cystoscopy, left retrograde pyelogram, left ureteroscopy with holmium laser lithotripsy, stone basket extraction, left JJ stent (Left)  PYELOGRAM, RETROGRADE (N/A)  EXTRACTION - STONE (Left)      OUTCOME: Patient tolerated treatment/procedure well without complication and is now ready for discharge.    DISPOSITION: Home or Self Care    FINAL DIAGNOSIS:  Left ureteral stone    FOLLOWUP: In clinic    DISCHARGE INSTRUCTIONS:    Discharge Procedure Orders   Cystoscopy w/ stent removal   Standing Status: Future Standing Exp. Date: 08/17/24        TIME SPENT ON DISCHARGE: 15 minutes

## 2024-06-17 NOTE — INTERVAL H&P NOTE
The patient has been examined and the H&P has been reviewed:    I concur with the findings and no changes have occurred since H&P was written.    Anesthesia/Surgery risks, benefits and alternative options discussed and understood by patient/family.      Problem Noted   Left Ureteral Stone 6/7/2024    Left 7 mm distal ureteral stone, bilateral nonobstructing stones.       OR for cysto, left RPG, left URS/HLL/SBE/stent  The risks, benefits, alteratives of the procedure were discussed with the patient.  The patient was given time for questions, all questions were answered.  Written, informed consent was obtained.      Humza Saenz MD

## 2024-06-17 NOTE — ANESTHESIA PREPROCEDURE EVALUATION
06/17/2024  Josselyn Tarango is a 58 y.o., female here for cystoscopy w/laser lithotripsy of L ureteral stone.       Pre-op Assessment    I have reviewed the Patient Summary Reports.    I have reviewed the NPO Status.   I have reviewed the Medications.     Review of Systems  Anesthesia Hx:  No problems with previous Anesthesia               Denies Personal Hx of Anesthesia complications.                    Social:  Non-Smoker, No Alcohol Use       Renal/:  Chronic Renal Disease renal calculi               Endocrine:  Diabetes         Obesity / BMI > 30      Physical Exam  General: Well nourished, Cooperative, Alert and Oriented    Airway:  Mallampati: II   Mouth Opening: Normal  TM Distance: Normal  Tongue: Normal  Neck ROM: Normal ROM        Anesthesia Plan  Type of Anesthesia, risks & benefits discussed:    Anesthesia Type: Gen ETT, Gen Supraglottic Airway  Intra-op Monitoring Plan: Standard ASA Monitors  Post Op Pain Control Plan: multimodal analgesia  Induction:  IV  Airway Plan: Video and Direct, Post-Induction  Informed Consent: Informed consent signed with the Patient and all parties understand the risks and agree with anesthesia plan.  All questions answered.   ASA Score: 2    Ready For Surgery From Anesthesia Perspective.     .

## 2024-06-17 NOTE — PLAN OF CARE
Patient discharge criteria met. IV removed per order with catheter intact.  Patient voided per protocol.  Pain well controlled, VSS.  Patient given discharge instructions, verbalized understanding and able to teach back.  No complications noted.  Patient awaiting ride home

## 2024-06-24 NOTE — PROGRESS NOTES
Subjective:       Patient ID: Josselyn Tarango is a 58 y.o. female.    Chief Complaint: No chief complaint on file.     This is a 58 y.o.  female patient that is an established patient of mine.    Left 7mm distal ureteral stone, kidney stones on CT 6/2024, concern for UTI.    Left stent 6/7/24 then left URS/HLL/SBE/stent 6/17/24--stone analysis pending.  Stent removd 6/25/24.         Lab Results   Component Value Date    CREATININE 0.9 06/10/2024       ---  PMH/PSH/Medications/Allergies/Social history reviewed and as in chart.    Review of Systems   Constitutional:  Negative for activity change, chills, fatigue and fever.   Respiratory:  Negative for cough, chest tightness and shortness of breath.    Cardiovascular:  Negative for chest pain.   Gastrointestinal:  Negative for abdominal distention, abdominal pain, nausea and vomiting.   Genitourinary:  Positive for flank pain. Negative for difficulty urinating, hematuria and pelvic pain.   Musculoskeletal:  Negative for back pain and gait problem.       Objective:      Physical Exam  HENT:      Head: Normocephalic.   Pulmonary:      Effort: Pulmonary effort is normal.   Abdominal:      General: Abdomen is flat.   Musculoskeletal:      Cervical back: Normal range of motion.   Skin:     General: Skin is warm.   Neurological:      General: No focal deficit present.      Mental Status: She is alert.         Assessment:     Problem Noted   Left Ureteral Stone 6/7/2024    Left 7 mm distal ureteral stone, bilateral nonobstructing stones.  Left stent 6/7/24 then left URS/HLL/SBE/stent 6/17/24--stone analysis pending.     Nephrolithiasis 6/7/2024    Bilateral non obstructing stones         Plan:     Stent removed, antibiotic given  Follow up in 1 month with RIVKA Saenz MD    The above referenced imaging and interpretations were personally reviewed.  Disclaimer: This note has been generated using voice-recognition software. There may be typographical errors that  have been missed during proof-reading.

## 2024-06-25 ENCOUNTER — PROCEDURE VISIT (OUTPATIENT)
Dept: UROLOGY | Facility: CLINIC | Age: 59
End: 2024-06-25
Payer: MEDICAID

## 2024-06-25 VITALS
BODY MASS INDEX: 33.73 KG/M2 | DIASTOLIC BLOOD PRESSURE: 85 MMHG | HEIGHT: 68 IN | SYSTOLIC BLOOD PRESSURE: 141 MMHG | HEART RATE: 120 BPM | WEIGHT: 222.56 LBS

## 2024-06-25 DIAGNOSIS — N20.0 NEPHROLITHIASIS: Chronic | ICD-10-CM

## 2024-06-25 DIAGNOSIS — N20.1 URETERAL STONE: ICD-10-CM

## 2024-06-25 DIAGNOSIS — N20.1 LEFT URETERAL STONE: Primary | ICD-10-CM

## 2024-06-25 PROCEDURE — 52310 CYSTOSCOPY AND TREATMENT: CPT | Mod: PBBFAC,PO | Performed by: UROLOGY

## 2024-06-25 RX ORDER — CIPROFLOXACIN 500 MG/1
500 TABLET ORAL
Status: COMPLETED | OUTPATIENT
Start: 2024-06-25 | End: 2024-06-25

## 2024-06-25 RX ADMIN — CIPROFLOXACIN 500 MG: 500 TABLET ORAL at 09:06

## 2024-06-25 NOTE — PROCEDURES
Procedure details:    Patient prepped and draped in normal sterile fashion.  17F flexible cystoscope introduced.  There was some edema around the stent.  The stent was then grasped with grasping forceps and removed in its entirety.  Patient tolerated the procedure well.    Humza Saenz MD

## 2024-07-13 ENCOUNTER — HOSPITAL ENCOUNTER (EMERGENCY)
Facility: HOSPITAL | Age: 59
Discharge: HOME | End: 2024-07-14
Attending: EMERGENCY MEDICINE
Payer: MEDICAID
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
## 2024-07-13 DIAGNOSIS — N20.1 RIGHT URETERAL STONE: ICD-10-CM

## 2024-07-13 DIAGNOSIS — N30.01 ACUTE CYSTITIS WITH HEMATURIA: Primary | ICD-10-CM

## 2024-07-13 LAB
ALBUMIN SERPL BCP-MCNC: 3.3 G/DL (ref 3.5–5.2)
ALP SERPL-CCNC: 104 U/L (ref 55–135)
ALT SERPL W/O P-5'-P-CCNC: <5 U/L (ref 10–44)
ANION GAP SERPL CALC-SCNC: 16 MMOL/L (ref 8–16)
AST SERPL-CCNC: 9 U/L (ref 10–40)
BACTERIA #/AREA URNS HPF: ABNORMAL /HPF
BASOPHILS # BLD AUTO: 0.07 K/UL (ref 0–0.2)
BASOPHILS NFR BLD: 0.6 % (ref 0–1.9)
BILIRUB SERPL-MCNC: 0.2 MG/DL (ref 0.1–1)
BILIRUB UR QL STRIP: NEGATIVE
BUN SERPL-MCNC: 13 MG/DL (ref 6–20)
CALCIUM SERPL-MCNC: 9.8 MG/DL (ref 8.7–10.5)
CHLORIDE SERPL-SCNC: 106 MMOL/L (ref 95–110)
CLARITY UR: CLEAR
CO2 SERPL-SCNC: 19 MMOL/L (ref 23–29)
COLOR UR: YELLOW
CREAT SERPL-MCNC: 1.2 MG/DL (ref 0.5–1.4)
DIFFERENTIAL METHOD BLD: ABNORMAL
EOSINOPHIL # BLD AUTO: 0.3 K/UL (ref 0–0.5)
EOSINOPHIL NFR BLD: 2.2 % (ref 0–8)
ERYTHROCYTE [DISTWIDTH] IN BLOOD BY AUTOMATED COUNT: 15.1 % (ref 11.5–14.5)
EST. GFR  (NO RACE VARIABLE): 52 ML/MIN/1.73 M^2
GLUCOSE SERPL-MCNC: 124 MG/DL (ref 70–110)
GLUCOSE UR QL STRIP: NEGATIVE
HCT VFR BLD AUTO: 37.2 % (ref 37–48.5)
HGB BLD-MCNC: 12.5 G/DL (ref 12–16)
HGB UR QL STRIP: ABNORMAL
HYALINE CASTS #/AREA URNS LPF: 1 /LPF
IMM GRANULOCYTES # BLD AUTO: 0.05 K/UL (ref 0–0.04)
IMM GRANULOCYTES NFR BLD AUTO: 0.4 % (ref 0–0.5)
KETONES UR QL STRIP: ABNORMAL
LEUKOCYTE ESTERASE UR QL STRIP: ABNORMAL
LIPASE SERPL-CCNC: 48 U/L (ref 4–60)
LYMPHOCYTES # BLD AUTO: 1.7 K/UL (ref 1–4.8)
LYMPHOCYTES NFR BLD: 14.5 % (ref 18–48)
MAGNESIUM SERPL-MCNC: 1.9 MG/DL (ref 1.6–2.6)
MCH RBC QN AUTO: 28.5 PG (ref 27–31)
MCHC RBC AUTO-ENTMCNC: 33.6 G/DL (ref 32–36)
MCV RBC AUTO: 85 FL (ref 82–98)
MICROSCOPIC COMMENT: ABNORMAL
MONOCYTES # BLD AUTO: 0.6 K/UL (ref 0.3–1)
MONOCYTES NFR BLD: 5.4 % (ref 4–15)
NEUTROPHILS # BLD AUTO: 9.2 K/UL (ref 1.8–7.7)
NEUTROPHILS NFR BLD: 76.9 % (ref 38–73)
NITRITE UR QL STRIP: NEGATIVE
NRBC BLD-RTO: 0 /100 WBC
PH UR STRIP: 6 [PH] (ref 5–8)
PLATELET # BLD AUTO: 289 K/UL (ref 150–450)
PMV BLD AUTO: 10 FL (ref 9.2–12.9)
POTASSIUM SERPL-SCNC: 2.7 MMOL/L (ref 3.5–5.1)
PROT SERPL-MCNC: 7 G/DL (ref 6–8.4)
PROT UR QL STRIP: NEGATIVE
RBC # BLD AUTO: 4.39 M/UL (ref 4–5.4)
RBC #/AREA URNS HPF: 2 /HPF (ref 0–4)
SARS-COV-2 RDRP RESP QL NAA+PROBE: NEGATIVE
SODIUM SERPL-SCNC: 141 MMOL/L (ref 136–145)
SP GR UR STRIP: 1.01 (ref 1–1.03)
SQUAMOUS #/AREA URNS HPF: 1 /HPF
URN SPEC COLLECT METH UR: ABNORMAL
UROBILINOGEN UR STRIP-ACNC: NEGATIVE EU/DL
WBC # BLD AUTO: 11.94 K/UL (ref 3.9–12.7)
WBC #/AREA URNS HPF: 75 /HPF (ref 0–5)
WBC CLUMPS URNS QL MICRO: ABNORMAL

## 2024-07-13 PROCEDURE — 85025 COMPLETE CBC W/AUTO DIFF WBC: CPT | Performed by: EMERGENCY MEDICINE

## 2024-07-13 PROCEDURE — 96365 THER/PROPH/DIAG IV INF INIT: CPT

## 2024-07-13 PROCEDURE — 96368 THER/DIAG CONCURRENT INF: CPT

## 2024-07-13 PROCEDURE — 96375 TX/PRO/DX INJ NEW DRUG ADDON: CPT

## 2024-07-13 PROCEDURE — 96361 HYDRATE IV INFUSION ADD-ON: CPT

## 2024-07-13 PROCEDURE — 87077 CULTURE AEROBIC IDENTIFY: CPT | Performed by: EMERGENCY MEDICINE

## 2024-07-13 PROCEDURE — 83735 ASSAY OF MAGNESIUM: CPT | Performed by: EMERGENCY MEDICINE

## 2024-07-13 PROCEDURE — 87088 URINE BACTERIA CULTURE: CPT | Performed by: EMERGENCY MEDICINE

## 2024-07-13 PROCEDURE — 63600175 PHARM REV CODE 636 W HCPCS: Performed by: EMERGENCY MEDICINE

## 2024-07-13 PROCEDURE — 80053 COMPREHEN METABOLIC PANEL: CPT | Performed by: EMERGENCY MEDICINE

## 2024-07-13 PROCEDURE — 83690 ASSAY OF LIPASE: CPT | Performed by: EMERGENCY MEDICINE

## 2024-07-13 PROCEDURE — 96366 THER/PROPH/DIAG IV INF ADDON: CPT

## 2024-07-13 PROCEDURE — 87086 URINE CULTURE/COLONY COUNT: CPT | Performed by: EMERGENCY MEDICINE

## 2024-07-13 PROCEDURE — 81000 URINALYSIS NONAUTO W/SCOPE: CPT | Performed by: EMERGENCY MEDICINE

## 2024-07-13 PROCEDURE — 25000003 PHARM REV CODE 250: Performed by: EMERGENCY MEDICINE

## 2024-07-13 PROCEDURE — U0002 COVID-19 LAB TEST NON-CDC: HCPCS | Performed by: EMERGENCY MEDICINE

## 2024-07-13 PROCEDURE — 99285 EMERGENCY DEPT VISIT HI MDM: CPT | Mod: 25

## 2024-07-13 PROCEDURE — 87186 SC STD MICRODIL/AGAR DIL: CPT | Performed by: EMERGENCY MEDICINE

## 2024-07-13 RX ORDER — POTASSIUM CHLORIDE 7.45 MG/ML
10 INJECTION INTRAVENOUS
Status: COMPLETED | OUTPATIENT
Start: 2024-07-13 | End: 2024-07-13

## 2024-07-13 RX ORDER — KETOROLAC TROMETHAMINE 30 MG/ML
15 INJECTION, SOLUTION INTRAMUSCULAR; INTRAVENOUS
Status: COMPLETED | OUTPATIENT
Start: 2024-07-13 | End: 2024-07-13

## 2024-07-13 RX ADMIN — CEFTRIAXONE SODIUM 1 G: 1 INJECTION, POWDER, FOR SOLUTION INTRAMUSCULAR; INTRAVENOUS at 10:07

## 2024-07-13 RX ADMIN — SODIUM CHLORIDE 1000 ML: 9 INJECTION, SOLUTION INTRAVENOUS at 08:07

## 2024-07-13 RX ADMIN — POTASSIUM CHLORIDE 10 MEQ: 7.46 INJECTION, SOLUTION INTRAVENOUS at 09:07

## 2024-07-13 RX ADMIN — POTASSIUM BICARBONATE 50 MEQ: 978 TABLET, EFFERVESCENT ORAL at 08:07

## 2024-07-13 RX ADMIN — KETOROLAC TROMETHAMINE 15 MG: 30 INJECTION, SOLUTION INTRAMUSCULAR; INTRAVENOUS at 08:07

## 2024-07-14 VITALS
HEART RATE: 70 BPM | DIASTOLIC BLOOD PRESSURE: 77 MMHG | BODY MASS INDEX: 30.31 KG/M2 | SYSTOLIC BLOOD PRESSURE: 168 MMHG | OXYGEN SATURATION: 97 % | WEIGHT: 200 LBS | RESPIRATION RATE: 19 BRPM | TEMPERATURE: 97 F | HEIGHT: 68 IN

## 2024-07-14 LAB
ANION GAP SERPL CALC-SCNC: 12 MMOL/L (ref 8–16)
BUN SERPL-MCNC: 11 MG/DL (ref 6–20)
CALCIUM SERPL-MCNC: 9 MG/DL (ref 8.7–10.5)
CHLORIDE SERPL-SCNC: 109 MMOL/L (ref 95–110)
CO2 SERPL-SCNC: 22 MMOL/L (ref 23–29)
CREAT SERPL-MCNC: 1.1 MG/DL (ref 0.5–1.4)
EST. GFR  (NO RACE VARIABLE): 58 ML/MIN/1.73 M^2
GLUCOSE SERPL-MCNC: 101 MG/DL (ref 70–110)
POTASSIUM SERPL-SCNC: 3.5 MMOL/L (ref 3.5–5.1)
SODIUM SERPL-SCNC: 143 MMOL/L (ref 136–145)

## 2024-07-14 PROCEDURE — 80048 BASIC METABOLIC PNL TOTAL CA: CPT | Performed by: EMERGENCY MEDICINE

## 2024-07-14 RX ORDER — ONDANSETRON 4 MG/1
4 TABLET, ORALLY DISINTEGRATING ORAL EVERY 6 HOURS PRN
Qty: 20 TABLET | Refills: 0 | Status: SHIPPED | OUTPATIENT
Start: 2024-07-14 | End: 2024-07-17

## 2024-07-14 RX ORDER — CEPHALEXIN 500 MG/1
500 CAPSULE ORAL EVERY 8 HOURS
Qty: 21 CAPSULE | Refills: 0 | Status: SHIPPED | OUTPATIENT
Start: 2024-07-14 | End: 2024-07-17 | Stop reason: ALTCHOICE

## 2024-07-14 RX ORDER — TAMSULOSIN HYDROCHLORIDE 0.4 MG/1
0.4 CAPSULE ORAL DAILY
Qty: 10 CAPSULE | Refills: 0 | Status: SHIPPED | OUTPATIENT
Start: 2024-07-14 | End: 2024-07-24

## 2024-07-14 RX ORDER — HYDROCODONE BITARTRATE AND ACETAMINOPHEN 5; 325 MG/1; MG/1
1 TABLET ORAL EVERY 6 HOURS PRN
Qty: 12 TABLET | Refills: 0 | Status: SHIPPED | OUTPATIENT
Start: 2024-07-14 | End: 2024-07-17

## 2024-07-14 NOTE — ED NOTES
"Introduced self to patient. Patient alert & orientated x4. Patient c/o headache from right cheek to right forehead 6/10 sharp pain, states hx of stroke and TIA approximately 8yrs ago, stopped taking prescribed blood thinner, only takes aspirin at this time. No facial droop or unilateral weakness present on exam, denies new tingling or numbness in extremeties. States has abdominal pain w/hx of kidney stones. Pain only present in lower abdomen at this time. Patient originally came in for "heat exhaustion". States feeling dry, requesting ice water. States feeling nauseous suddenly, emesis bag provided r/t same. No other voiced concerns when asked, no visible distress at this time.  "

## 2024-07-14 NOTE — DISCHARGE INSTRUCTIONS
Follow up with your Urologist.  Take your meds as prescribed.    Return to the emergency department if you have increasing pain, fever, chest pain, difficulty breathing,  nonstop vomiting, or any other concerns. Be sure to drink plenty of fluids to stay hydrated. Get plenty of rest. Please refer to additional educational material for further instructions.

## 2024-07-14 NOTE — ED PROVIDER NOTES
Chief Complaint:  Feeling weak, lower abdominal pain    History of Present Illness:    Josselyn Tarango 58 y.o. with a  has a past medical history of Diabetes mellitus and Digestive disorder. who presents to the emergency department today with a complaint of feeling weak and having lower abdominal pain.  Patient reports that she has been in a trailer that has been without AC for the last 2 days.  She has been doing her best to stay hydrated and cool however tonight started to feel very weak and lightheaded.  Went to take a bath in cool water, however when she got out she just felt weak and dizzy.  She did not pass out.  She has been having some bilateral abdominal pain.  She does have a history of kidney stones.  She reports she has urinary frequency.  She denies any fever, chills or sweats.  Additionally she reports she had an episode of sharp pain that went from the jaw to the temple that that was on the way here but that has resolved.        ROS  Otherwise remaining ROS negative     The history is provided by the patient      Reviewed and verified by myself:   PMH/PSH/SOC/FH REVIEWED :    Past Medical History:   Diagnosis Date    Diabetes mellitus     diet control    Digestive disorder     Hx: gastric ulcer       Past Surgical History:   Procedure Laterality Date    CYSTOSCOPY W/ URETERAL STENT PLACEMENT Left 6/7/2024    Procedure: CYSTOSCOPY, left retrograde pyelogram, left JJ stent;  Surgeon: Humza Saenz MD;  Location: [facility] OR;  Service: Urology;  Laterality: Left;  MAC vs choice    CYSTOURETEROSCOPY, WITH HOLMIUM LASER LITHOTRIPSY OF URETERAL CALCULUS AND STENT INSERTION Left 6/17/2024    Procedure: Cystoscopy, left retrograde pyelogram, left ureteroscopy with holmium laser lithotripsy, stone basket extraction, left JJ stent;  Surgeon: Humza Saenz MD;  Location: [facility] OR;  Service: Urology;  Laterality: Left;  Element    EXTRACTION - STONE Left 6/17/2024    Procedure: EXTRACTION - STONE;   Surgeon: Humza Saenz MD;  Location: [facility];  Service: Urology;  Laterality: Left;    RETROGRADE PYELOGRAPHY N/A 6/7/2024    Procedure: PYELOGRAM, RETROGRADE;  Surgeon: Humza Saenz MD;  Location: [facility] OR;  Service: Urology;  Laterality: N/A;    RETROGRADE PYELOGRAPHY N/A 6/17/2024    Procedure: PYELOGRAM, RETROGRADE;  Surgeon: Humza Saenz MD;  Location: [facility] OR;  Service: Urology;  Laterality: N/A;       Social History     Socioeconomic History    Marital status: Single   Tobacco Use    Smoking status: Every Day     Types: Cigarettes    Smokeless tobacco: Never       No family history on file.      ALLERGIES REVIEWED  Review of patient's allergies indicates:   Allergen Reactions    Sulfa (sulfonamide antibiotics)        MEDICATIONS REVIEWED  Medication List with Changes/Refills   New Medications    CEPHALEXIN (KEFLEX) 500 MG CAPSULE    Take 1 capsule (500 mg total) by mouth every 8 (eight) hours. for 7 days    HYDROCODONE-ACETAMINOPHEN (NORCO) 5-325 MG PER TABLET    Take 1 tablet by mouth every 6 (six) hours as needed for Pain (severe pain).    ONDANSETRON (ZOFRAN-ODT) 4 MG TBDL    Take 1 tablet (4 mg total) by mouth every 6 (six) hours as needed (nausea or vomiting).    TAMSULOSIN (FLOMAX) 0.4 MG CAP    Take 1 capsule (0.4 mg total) by mouth once daily. for 10 days   Current Medications    OXYCODONE-ACETAMINOPHEN (PERCOCET) 5-325 MG PER TABLET    Take 1 tablet by mouth every 4 (four) hours as needed for Pain.    TAMSULOSIN (FLOMAX) 0.4 MG CAP    Take 1 capsule (0.4 mg total) by mouth once daily. for 10 days           VS reviewed    Nursing/Ancillary staff note reviewed.       Physical Exam     ED Triage Vitals [07/13/24 1854]   BP (!) 122/55   Pulse 77   Resp 19   Temp (!) 100.4 °F (38 °C)   SpO2 97 %       Physical Exam    Constitutional: The patient is alert, has no immediate need for airway protection and no signs of toxicity. No acute distress. Lying in bed but able to sit up without  difficulty.   ENT: Mucous membranes are dry. Oropharynx clear.   Neck: Neck is supple non-tender. No lymphadenopathy. No stridor.   Respiratory: There are no retractions, lungs are clear to auscultation. No wheezing, no crackles.   Cardiovascular: Regular rate and rhythm. No murmurs, rubs or gallops.  Gastrointestinal:  Abdomen is soft and non-tender, no masses, bowel sounds normal. No guarding, no rebound.  No pulsatile mass.   Neurological: Alert and oriented x 4. CN II-XII grossly intact. No focal weakness. Strength intact 5/5 bilaterally in upper and lower extremities.   Skin: Warm and dry, no rashes.  Musculoskeletal: Extremities are non-tender, non-swollen and have full range of motion. Back NTTP along the midline.   Psyc: Normal behavior. Linear thought content.          ED Course       Labs Reviewed   CBC W/ AUTO DIFFERENTIAL - Abnormal; Notable for the following components:       Result Value    RDW 15.1 (*)     Gran # (ANC) 9.2 (*)     Immature Grans (Abs) 0.05 (*)     Gran % 76.9 (*)     Lymph % 14.5 (*)     All other components within normal limits   COMPREHENSIVE METABOLIC PANEL - Abnormal; Notable for the following components:    Potassium 2.7 (*)     CO2 19 (*)     Glucose 124 (*)     Albumin 3.3 (*)     AST 9 (*)     ALT <5 (*)     eGFR 52 (*)     All other components within normal limits    Narrative:     K critical result(s) called and verbal readback obtained from   Keke Mulligan RN by AAC2 07/13/2024 20:24   URINALYSIS, REFLEX TO URINE CULTURE - Abnormal; Notable for the following components:    Ketones, UA Trace (*)     Occult Blood UA Trace (*)     Leukocytes, UA 3+ (*)     All other components within normal limits    Narrative:     Specimen Source->Urine   URINALYSIS MICROSCOPIC - Abnormal; Notable for the following components:    WBC, UA 75 (*)     Bacteria Few (*)     All other components within normal limits    Narrative:     Specimen Source->Urine   BASIC METABOLIC PANEL - Abnormal;  Notable for the following components:    CO2 22 (*)     eGFR 58 (*)     All other components within normal limits   CULTURE, URINE   LIPASE   SARS-COV-2 RNA AMPLIFICATION, QUAL   MAGNESIUM   MAGNESIUM     Imaging Results              CT Renal Stone Study ABD Pelvis WO (Final result)  Result time 07/13/24 22:35:42      Final result by Don Jacobsen DO (07/13/24 22:35:42)                   Impression:      1. Mild right-sided hydroureteronephrosis secondary to 3 calculi in the distal ureter measuring 2 mm, 3 mm, and 4 mm respectively.  2. Indeterminate hypodensity in the liver which can be further evaluated with nonemergent MRI of the abdomen with and without contrast.  3. Bilateral adrenal adenomas, stable.  4. Colonic diverticulosis.      Electronically signed by: Don Jacobsen  Date:    07/13/2024  Time:    22:35               Narrative:    EXAMINATION:  CT RENAL STONE STUDY ABD PELVIS WO    CLINICAL HISTORY:  Flank pain, kidney stone suspected;    TECHNIQUE:  Multiplanar images were obtained of the abdomen and pelvis from the hemidiaphragms through the symphysis pubis without intravenous contrast.    COMPARISON:  CT of the abdomen and pelvis from 06/07/2024.    FINDINGS:  Lung Bases: Clear.    Heart: Heart size is normal.  No pericardial effusion.    Liver: There is an indeterminate hypodensity in the right hepatic lobe measuring 1.5 cm, stable from prior.  No additional hepatic lesions are seen.    Biliary tract: No intrahepatic or extrahepatic biliary ductal dilatation.    Gallbladder: No radiodense gallstone. No wall thickening or pericholecystic fluid.    Pancreas: Normal. No pancreatic ductal dilatation.    Spleen: Normal size without focal lesion.    Adrenals: There are stable bilateral adrenal nodules, compatible with adenomas.    Kidneys and urinary collecting systems: There are 3 calculi in the right distal ureter, just proximal to the UVJ, measuring 4 mm, 3 mm, and 2 mm respectively, resulting in  mild hydroureteronephrosis.  There is a simple cyst in the right kidney midpole.  The left kidney is unremarkable without hydronephrosis or nephrolithiasis.    Lymph nodes: None enlarged.    Stomach and bowel: The stomach is normal.  There is duodenal diverticulum.  Loops of small and large bowel are normal in caliber without evidence for inflammation or obstruction.  There is colonic diverticulosis without evidence of acute diverticulitis.    Peritoneum and mesentery: No ascites or free intraperitoneal air. No abdominal fluid collection.  There are surgical clips in the mesentery.    Vasculature: There is moderate atherosclerosis.  There is no aneurysm.    Urinary bladder: Normal.    Reproductive organs: The uterus is absent.    Body wall: There is a fat containing hernia of the left lower anterior abdominal wall.    Musculoskeletal: No aggressive osseous lesion.                                          ED Course as of 07/14/24 0158   Sat Jul 13, 2024 2028 Potassium(!!): 2.7  Low, will replete  [JA]   2119 CBC unremarkable [JA]   2134 SARS-CoV-2 RNA, Amplification, Qual: Negative [JA]   2134 Leukocyte Esterase, UA(!): 3+ [JA]   2134 WBC, UA(!): 75 [JA]   2135 Magnesium : 1.9  Normal [JA]   2135 Lipase: 48  Normal [JA]   2135 CBC unremarkable [JA]   Sun Jul 14, 2024   0101 Potassium: 3.5  Significantly improved.  Appropriate for discharge home. [JA]      ED Course User Index  [JA] Emir Huang MD          Medical Decision Making  Problems Addressed:  Acute cystitis with hematuria: complicated acute illness or injury with systemic symptoms  Exposure to excessive natural heat as cause of accidental injury, initial encounter: complicated acute illness or injury with systemic symptoms  Right ureteral stone: complicated acute illness or injury with systemic symptoms    Amount and/or Complexity of Data Reviewed  External Data Reviewed: notes.     Details:   Patient had a very large kidney stone in the past, 7 x 4  cm that required urologic interventions.  Labs: ordered. Decision-making details documented in ED Course.  Radiology: ordered and independent interpretation performed.     Details: CT scan of the abdomen and pelvis shows right-sided ureteral stone    Risk  OTC drugs.  Prescription drug management.        Social determinants of health taken into consideration during development of our treatment plan include   Body mass index is 30.41 kg/m². - Cumulative social disadvantage, denoted by higher SDOH burden, was associated with increased odds of obesity, independent of clinical and demographic factors. PMID: 68920652 DOI: 10.1002/simin.85851  Smoking/tobacco use -  Smoking was the leading social determinant of health affecting mortality and life expectancy, although income was another strong SDOH predictor, according to researchers from the [facility] at [facility] and the Department of Sociology at [facility]. DAVID Netw Open. 2022;5(4):v542170. doi:10.1001/jamanetworkopen.2022.6547      Pt received the following in the ED:   Medications   ketorolac injection 15 mg (15 mg Intravenous Given 7/13/24 2000)   sodium chloride 0.9% bolus 1,000 mL 1,000 mL (0 mLs Intravenous Stopped 7/13/24 2100)   potassium bicarbonate disintegrating tablet 50 mEq (50 mEq Oral Given 7/13/24 2051)   potassium chloride 10 mEq in 100 mL IVPB (0 mEq Intravenous Stopped 7/13/24 2335)   cefTRIAXone (Rocephin) 1 g in D5W 100 mL IVPB (MB+) (0 g Intravenous Stopped 7/13/24 2335)       After consideration of the pts current home medications, the decision is made to maintain the current medication regimen.      Will start :  New Prescriptions    CEPHALEXIN (KEFLEX) 500 MG CAPSULE    Take 1 capsule (500 mg total) by mouth every 8 (eight) hours. for 7 days    HYDROCODONE-ACETAMINOPHEN (NORCO) 5-325 MG PER TABLET    Take 1 tablet by mouth every 6 (six) hours as needed for Pain (severe pain).    ONDANSETRON (ZOFRAN-ODT) 4 MG TBDL     Take 1 tablet (4 mg total) by mouth every 6 (six) hours as needed (nausea or vomiting).    TAMSULOSIN (FLOMAX) 0.4 MG CAP    Take 1 capsule (0.4 mg total) by mouth once daily. for 10 days       OTC products such as tylenol or ibuprofen discussed for supplemental symptom control.         ED Management:  Differential diagnosis included but not limited to : Infectious such as COVID, flu, UTI,  Metabolic derangements such as electrolyte abnormalities, dehydration/JUAN LUIS, considered but much less likely to be central etiology, spinal cord or peripheral nerve, neuromuscular muscular etiology, Musculoskeletal causes, kidney stone, pyelonephritis, shingles, and intra-abdominal causes such as diverticulitis and appendicitis, aortic dissection, abdominal aortic aneurysm, colitis, PE, pneumonia.       Orders I ordered to further evaluate included:    Orders Placed This Encounter   Procedures    Urine culture    CT Renal Stone Study ABD Pelvis WO    CBC auto differential    Comprehensive metabolic panel    Lipase    Urinalysis, Reflex to Urine Culture Urine, Clean Catch    COVID-19 Rapid Screening    Magnesium    Magnesium    Urinalysis Microscopic    Basic metabolic panel    Orthostatic vital signs    Insert Saline lock IV         Comorbidity impacting this encounter includes:  has a past medical history of Diabetes mellitus and Digestive disorder.      MDM continued:     Josselyn Tarango  presents to the emergency Department today with an acute, complicated problem with systemic symptoms of weakness, some abdominal pain.  Patient has been out in the heat and unlikely has some component of heat injury to her presentation today.  She was feeling much improved following IV fluids.  Additionally she has been having some lower abdominal pain, she has a longstanding history of kidney stones and needed interventions on the left side recently.  Given this a CT scan of the abdomen and pelvis was performed which shows that she has 3  new ureteral stones on the right however they are all small and will likely pass on their own, 2 mm 3 mm 4 mm.  At this time there is no need for any emergent urologic interventions for her.   I will place her on antibiotics given her urinalysis results and have her follow up with her urologist.  The patient was comfortable with this plan.  We discussed warning signs for return.  She feels comfortable going home at this time.       The pt is comfortable with this plan and comfortable going home at this time. After taking into careful account the historical factors and physical exam findings of the patient's presentation today, in conjunction with the empirical and objective data obtained on ED workup, no acute emergent medical condition requiring admission has been identified. The patient appears to be low risk for an emergent medical condition and I feel it is safe and appropriate at this time for the patient to be discharged to follow-up as detailed in their discharge instructions for reevaluation and possible continued outpatient workup and management. Regardless, an unremarkable evaluation in the ED does not preclude the development or presence of a serious or life threatening condition. As such, patient was instructed to return immediately for any worsening or change in current symptoms. Precautions for return discussed at length.  Discharge and follow-up instructions discussed with the patient who expressed understanding and willingness to comply with my recommendations.    Voice recognition software utilized in this note.        Impression      The primary encounter diagnosis was Acute cystitis with hematuria. Diagnoses of Right ureteral stone and Exposure to excessive natural heat as cause of accidental injury, initial encounter were also pertinent to this visit.        New Prescriptions    CEPHALEXIN (KEFLEX) 500 MG CAPSULE    Take 1 capsule (500 mg total) by mouth every 8 (eight) hours. for 7 days     HYDROCODONE-ACETAMINOPHEN (NORCO) 5-325 MG PER TABLET    Take 1 tablet by mouth every 6 (six) hours as needed for Pain (severe pain).    ONDANSETRON (ZOFRAN-ODT) 4 MG TBDL    Take 1 tablet (4 mg total) by mouth every 6 (six) hours as needed (nausea or vomiting).    TAMSULOSIN (FLOMAX) 0.4 MG CAP    Take 1 capsule (0.4 mg total) by mouth once daily. for 10 days        Follow-up Information       Humza Saenz MD. Schedule an appointment as soon as possible for a visit in 2 days.    Specialty: Urology  Contact information:  [address]  [address]  [facility] [id number] [phone]                                      Emir Huang MD  07/15/24 0147

## 2024-07-14 NOTE — ED NOTES
Patient collecting items in room, then agreeable to wait in waiting room for ride home or elsewhere.

## 2024-07-15 ENCOUNTER — HOSPITAL ENCOUNTER (EMERGENCY)
Facility: HOSPITAL | Age: 59
Discharge: HOME | End: 2024-07-15
Attending: FAMILY MEDICINE
Payer: MEDICAID

## 2024-07-15 VITALS
DIASTOLIC BLOOD PRESSURE: 91 MMHG | SYSTOLIC BLOOD PRESSURE: 158 MMHG | RESPIRATION RATE: 19 BRPM | TEMPERATURE: 99 F | BODY MASS INDEX: 30.31 KG/M2 | HEART RATE: 77 BPM | OXYGEN SATURATION: 98 % | HEIGHT: 68 IN | WEIGHT: 200 LBS

## 2024-07-15 DIAGNOSIS — T63.481A INSECT STINGS, ACCIDENTAL OR UNINTENTIONAL, INITIAL ENCOUNTER: Primary | ICD-10-CM

## 2024-07-15 DIAGNOSIS — Z87.892 HISTORY OF ANAPHYLAXIS: ICD-10-CM

## 2024-07-15 PROCEDURE — 25000003 PHARM REV CODE 250: Mod: ER

## 2024-07-15 PROCEDURE — 99283 EMERGENCY DEPT VISIT LOW MDM: CPT | Mod: ER

## 2024-07-15 RX ORDER — EPINEPHRINE 0.3 MG/.3ML
1 INJECTION SUBCUTANEOUS
Qty: 1 EACH | Refills: 0 | Status: SHIPPED | OUTPATIENT
Start: 2024-07-15 | End: 2025-07-15

## 2024-07-15 RX ORDER — DIPHENHYDRAMINE HCL 25 MG
25 CAPSULE ORAL
Status: COMPLETED | OUTPATIENT
Start: 2024-07-15 | End: 2024-07-15

## 2024-07-15 RX ADMIN — DIPHENHYDRAMINE HYDROCHLORIDE 25 MG: 25 CAPSULE ORAL at 11:07

## 2024-07-15 NOTE — ED TRIAGE NOTES
S/p bee sting to right foot about 1 hr PTA with states h/o anaphylaxis. Continues with localized tenderness and itching but no reports of SOB or CP, Presents awake, alert.

## 2024-07-15 NOTE — DISCHARGE INSTRUCTIONS

## 2024-07-15 NOTE — ED PROVIDER NOTES
Encounter Date: 7/15/2024       History     Chief Complaint   Patient presents with    Insect Bite     Pt C/O bee sting to R foot between 3rd and 4th toe. Erythema and swelling noted.  Pt C/O itching.  Pt reports she has had an anaphylactic reaction to a bee sting in her past but is exhibiting no symptoms today. CANDICE.      58-year-old female with history of diabetes and gastric ulcer presents today for evaluation of suspected bee sting between the 3rd and 4th digits of the right foot that occurred just prior to arrival.  Patient states she saw a bee flying around her room and then felt a sting between her toes.  She expresses concern because she has had anaphylactic reaction to a bee sting in the past. She has an EpiPen that is , she is requesting refill today.  She does have some discomfort to the toes, denies taking any medications prior to arrival.  She also denies shortness of breath, nausea, vomiting, chest pain.  Of note, patient was seen in this ED yesterday where she was diagnosed with kidney stones and acute cystitis.  She has yet to  her prescriptions to the pharmacy due to transportation issues.    The history is provided by the patient and medical records. No  was used.     Review of patient's allergies indicates:   Allergen Reactions    Sulfa (sulfonamide antibiotics)      Past Medical History:   Diagnosis Date    Diabetes mellitus     diet control    Digestive disorder     Hx: gastric ulcer     Past Surgical History:   Procedure Laterality Date    CYSTOSCOPY W/ URETERAL STENT PLACEMENT Left 2024    Procedure: CYSTOSCOPY, left retrograde pyelogram, left JJ stent;  Surgeon: Humza Saenz MD;  Location: [facility];  Service: Urology;  Laterality: Left;  MAC vs choice    CYSTOURETEROSCOPY, WITH HOLMIUM LASER LITHOTRIPSY OF URETERAL CALCULUS AND STENT INSERTION Left 2024    Procedure: Cystoscopy, left retrograde pyelogram, left ureteroscopy with holmium laser  lithotripsy, stone basket extraction, left JJ stent;  Surgeon: Humza Saenz MD;  Location: [facility] OR;  Service: Urology;  Laterality: Left;  Element    EXTRACTION - STONE Left 6/17/2024    Procedure: EXTRACTION - STONE;  Surgeon: Humza Saenz MD;  Location: [facility] OR;  Service: Urology;  Laterality: Left;    RETROGRADE PYELOGRAPHY N/A 6/7/2024    Procedure: PYELOGRAM, RETROGRADE;  Surgeon: Humza Saenz MD;  Location: [facility] OR;  Service: Urology;  Laterality: N/A;    RETROGRADE PYELOGRAPHY N/A 6/17/2024    Procedure: PYELOGRAM, RETROGRADE;  Surgeon: Humza Saenz MD;  Location: [facility] OR;  Service: Urology;  Laterality: N/A;     No family history on file.  Social History     Tobacco Use    Smoking status: Every Day     Types: Cigarettes    Smokeless tobacco: Never     Review of Systems   Constitutional:  Negative for fever.   HENT:  Negative for sore throat.    Respiratory:  Negative for shortness of breath.    Cardiovascular:  Negative for chest pain.   Gastrointestinal:  Negative for nausea.   Genitourinary:  Negative for dysuria.   Musculoskeletal:  Negative for back pain.   Skin:  Negative for rash.        Bee sting between 3rd and 4th digits of right foot   Neurological:  Negative for weakness.   Hematological:  Does not bruise/bleed easily.       Physical Exam     Initial Vitals [07/15/24 1117]   BP Pulse Resp Temp SpO2   (!) 158/91 87 19 99.4 °F (37.4 °C) 97 %      MAP       --         Physical Exam    Nursing note and vitals reviewed.  Constitutional: She appears well-developed and well-nourished. She is not diaphoretic.  Non-toxic appearance.   HENT:   Head: Normocephalic.   Nose: Nose normal.   Mouth/Throat: Uvula is midline, oropharynx is clear and moist and mucous membranes are normal.   Eyes: Conjunctivae are normal.   Neck: Neck supple.   Cardiovascular:  Normal rate and intact distal pulses.           Pulmonary/Chest: Breath sounds normal. No respiratory distress. She has no  wheezes.   Abdominal: Abdomen is soft. She exhibits no distension. There is no abdominal tenderness.   Musculoskeletal:      Cervical back: Normal and neck supple.      Thoracic back: Normal.      Lumbar back: Normal.     Neurological: She is alert and oriented to person, place, and time. GCS score is 15. GCS eye subscore is 4. GCS verbal subscore is 5. GCS motor subscore is 6.   Skin: Skin is warm and dry. Capillary refill takes less than 2 seconds.   Distal erythema noted to all digits of the left and right feet, patient notes this is chronic.  No obvious swelling, warmth, circumferential erythema.  Skin is clean, dry, intact. No evidence of retained stinger.    Psychiatric: She has a normal mood and affect. Her behavior is normal.            ED Course   Procedures  Labs Reviewed - No data to display       Imaging Results    None          Medications   diphenhydrAMINE capsule 25 mg (25 mg Oral Given 7/15/24 1521)     Medical Decision Making  58-year-old female with history of diabetes and gastric ulcer presents today for evaluation of suspected bee sting between the 3rd and 4th digits of the right foot that occurred just prior to arrival.  On exam she appears to be resting comfortably in no acute distress and non-toxic appearing. VSS, she is afebrile at 99.4°, oxygenating well at 98% on room air. Distal erythema noted to all digits of both feet, patient notes this is chronic.  No obvious swelling, warmth, circumferential erythema.  Skin is clean, dry, intact. No evidence of retained stinger.  Please see photo above.  Oropharynx is clear and moist, no angioedema.  Patient is speaking clearly and tolerating oral secretions.  Heart and lungs are clear to auscultation, respirations are even and unlabored.     Differential includes but isn't limited to:  Insect sting/bite, cellulitis, osteomyelitis, allergic reaction, anaphylaxis    Patient reports some itching, she was given Benadryl.  Vitals have remained stable in  the ED after about 2 hours of observation.  She continues to deny shortness of breath, nausea, vomiting.  Exam is unremarkable today.  Patient does report history of anaphylaxis, I will refill her prescription for an EpiPen.  I emphasized importance of taking prescriptions, specifically ones from yesterday.  Patient verbalized understanding.  She was encouraged to follow up with her PCP, otherwise return to the ED for new or worsening symptoms.  All questions answered at this time.    Risk  OTC drugs.               ED Course as of 07/15/24 1356   Mon Jul 15, 2024   1126 BP(!): 158/91 [EP]   1126 Temp: 99.4 °F (37.4 °C) [EP]   1126 Pulse: 87 [EP]   1126 Resp: 19 [EP]   1126 SpO2: 97 % [EP]   1236 Pulse: 77 [EP]   1236 SpO2: 98 % [EP]      ED Course User Index  [EP] Yamileth Galeano PA-C                           Clinical Impression:  Final diagnoses:  [T63.481A] Insect stings, accidental or unintentional, initial encounter (Primary)  [Z87.892] History of anaphylaxis          ED Disposition Condition    Discharge Stable          ED Prescriptions       Medication Sig Dispense Start Date End Date Auth. Provider    EPINEPHrine (EPIPEN) 0.3 mg/0.3 mL AtIn Inject 0.3 mLs (0.3 mg total) into the muscle as needed (anaphylaxis). 1 each 7/15/2024 7/15/2025 Yamileth Galeano PA-C          Follow-up Information       Follow up With Specialties Details Why Contact [facility] - Emergency Dept Emergency Medicine  If symptoms worsen [address] [facility] [address] [phone]    Your PCP   As needed              Yamileth Galeano PA-C  07/15/24 9592

## 2024-07-16 LAB — BACTERIA UR CULT: ABNORMAL

## 2024-07-17 RX ORDER — NITROFURANTOIN 25; 75 MG/1; MG/1
100 CAPSULE ORAL 2 TIMES DAILY
Qty: 10 CAPSULE | Refills: 0 | Status: SHIPPED | OUTPATIENT
Start: 2024-07-17 | End: 2024-07-22

## 2024-07-17 RX ORDER — ONDANSETRON 4 MG/1
4 TABLET, ORALLY DISINTEGRATING ORAL EVERY 6 HOURS PRN
Qty: 28 TABLET | Refills: 0 | Status: SHIPPED | OUTPATIENT
Start: 2024-07-17

## 2024-07-23 ENCOUNTER — PATIENT MESSAGE (OUTPATIENT)
Dept: UROLOGY | Facility: CLINIC | Age: 59
End: 2024-07-23
Payer: COMMERCIAL

## 2024-09-09 PROBLEM — N39.0 UTI (URINARY TRACT INFECTION): Chronic | Status: RESOLVED | Noted: 2024-06-07 | Resolved: 2024-09-09

## 2024-10-10 ENCOUNTER — HOSPITAL ENCOUNTER (EMERGENCY)
Facility: HOSPITAL | Age: 59
Discharge: HOME | End: 2024-10-11
Attending: EMERGENCY MEDICINE
Payer: MEDICAID

## 2024-10-10 VITALS
RESPIRATION RATE: 14 BRPM | TEMPERATURE: 98 F | DIASTOLIC BLOOD PRESSURE: 74 MMHG | WEIGHT: 200 LBS | BODY MASS INDEX: 30.31 KG/M2 | HEIGHT: 68 IN | HEART RATE: 63 BPM | SYSTOLIC BLOOD PRESSURE: 137 MMHG | OXYGEN SATURATION: 99 %

## 2024-10-10 DIAGNOSIS — K30 INDIGESTION: ICD-10-CM

## 2024-10-10 DIAGNOSIS — N20.0 KIDNEY STONE ON RIGHT SIDE: Primary | ICD-10-CM

## 2024-10-10 LAB
ALBUMIN SERPL BCP-MCNC: 3.6 G/DL (ref 3.5–5.2)
ALP SERPL-CCNC: 102 U/L (ref 55–135)
ALT SERPL W/O P-5'-P-CCNC: 8 U/L (ref 10–44)
ANION GAP SERPL CALC-SCNC: 11 MMOL/L (ref 8–16)
AST SERPL-CCNC: 12 U/L (ref 10–40)
BACTERIA #/AREA URNS HPF: ABNORMAL /HPF
BASOPHILS # BLD AUTO: 0.05 K/UL (ref 0–0.2)
BASOPHILS NFR BLD: 0.7 % (ref 0–1.9)
BILIRUB SERPL-MCNC: 0.3 MG/DL (ref 0.1–1)
BILIRUB UR QL STRIP: NEGATIVE
BUN SERPL-MCNC: 7 MG/DL (ref 6–20)
CALCIUM SERPL-MCNC: 9.5 MG/DL (ref 8.7–10.5)
CHLORIDE SERPL-SCNC: 107 MMOL/L (ref 95–110)
CLARITY UR: CLEAR
CO2 SERPL-SCNC: 23 MMOL/L (ref 23–29)
COLOR UR: COLORLESS
CREAT SERPL-MCNC: 1 MG/DL (ref 0.5–1.4)
DIFFERENTIAL METHOD BLD: ABNORMAL
EOSINOPHIL # BLD AUTO: 0.1 K/UL (ref 0–0.5)
EOSINOPHIL NFR BLD: 1.8 % (ref 0–8)
ERYTHROCYTE [DISTWIDTH] IN BLOOD BY AUTOMATED COUNT: 16.5 % (ref 11.5–14.5)
EST. GFR  (NO RACE VARIABLE): >60 ML/MIN/1.73 M^2
GLUCOSE SERPL-MCNC: 82 MG/DL (ref 70–110)
GLUCOSE UR QL STRIP: NEGATIVE
HCT VFR BLD AUTO: 41.8 % (ref 37–48.5)
HGB BLD-MCNC: 14 G/DL (ref 12–16)
HGB UR QL STRIP: ABNORMAL
IMM GRANULOCYTES # BLD AUTO: 0.02 K/UL (ref 0–0.04)
IMM GRANULOCYTES NFR BLD AUTO: 0.3 % (ref 0–0.5)
KETONES UR QL STRIP: NEGATIVE
LEUKOCYTE ESTERASE UR QL STRIP: ABNORMAL
LIPASE SERPL-CCNC: 24 U/L (ref 4–60)
LYMPHOCYTES # BLD AUTO: 2.3 K/UL (ref 1–4.8)
LYMPHOCYTES NFR BLD: 31.6 % (ref 18–48)
MCH RBC QN AUTO: 29.4 PG (ref 27–31)
MCHC RBC AUTO-ENTMCNC: 33.5 G/DL (ref 32–36)
MCV RBC AUTO: 88 FL (ref 82–98)
MICROSCOPIC COMMENT: ABNORMAL
MONOCYTES # BLD AUTO: 0.4 K/UL (ref 0.3–1)
MONOCYTES NFR BLD: 5.1 % (ref 4–15)
NEUTROPHILS # BLD AUTO: 4.4 K/UL (ref 1.8–7.7)
NEUTROPHILS NFR BLD: 60.5 % (ref 38–73)
NITRITE UR QL STRIP: NEGATIVE
NRBC BLD-RTO: 0 /100 WBC
PH UR STRIP: 6 [PH] (ref 5–8)
PLATELET # BLD AUTO: 293 K/UL (ref 150–450)
PMV BLD AUTO: 9.8 FL (ref 9.2–12.9)
POTASSIUM SERPL-SCNC: 3.3 MMOL/L (ref 3.5–5.1)
PROT SERPL-MCNC: 7.3 G/DL (ref 6–8.4)
PROT UR QL STRIP: NEGATIVE
RBC # BLD AUTO: 4.77 M/UL (ref 4–5.4)
RBC #/AREA URNS HPF: 1 /HPF (ref 0–4)
SODIUM SERPL-SCNC: 141 MMOL/L (ref 136–145)
SP GR UR STRIP: <1.005 (ref 1–1.03)
SQUAMOUS #/AREA URNS HPF: 2 /HPF
TROPONIN I SERPL DL<=0.01 NG/ML-MCNC: <0.006 NG/ML (ref 0–0.03)
URN SPEC COLLECT METH UR: ABNORMAL
UROBILINOGEN UR STRIP-ACNC: NEGATIVE EU/DL
WBC # BLD AUTO: 7.27 K/UL (ref 3.9–12.7)
WBC #/AREA URNS HPF: 17 /HPF (ref 0–5)

## 2024-10-10 PROCEDURE — 96375 TX/PRO/DX INJ NEW DRUG ADDON: CPT

## 2024-10-10 PROCEDURE — 93010 ELECTROCARDIOGRAM REPORT: CPT | Mod: ,,, | Performed by: INTERNAL MEDICINE

## 2024-10-10 PROCEDURE — 83690 ASSAY OF LIPASE: CPT | Performed by: PHYSICIAN ASSISTANT

## 2024-10-10 PROCEDURE — 85025 COMPLETE CBC W/AUTO DIFF WBC: CPT | Performed by: PHYSICIAN ASSISTANT

## 2024-10-10 PROCEDURE — 81000 URINALYSIS NONAUTO W/SCOPE: CPT | Performed by: PHYSICIAN ASSISTANT

## 2024-10-10 PROCEDURE — 99285 EMERGENCY DEPT VISIT HI MDM: CPT | Mod: 25

## 2024-10-10 PROCEDURE — 84484 ASSAY OF TROPONIN QUANT: CPT | Performed by: PHYSICIAN ASSISTANT

## 2024-10-10 PROCEDURE — 96365 THER/PROPH/DIAG IV INF INIT: CPT

## 2024-10-10 PROCEDURE — 87086 URINE CULTURE/COLONY COUNT: CPT | Performed by: PHYSICIAN ASSISTANT

## 2024-10-10 PROCEDURE — 93005 ELECTROCARDIOGRAM TRACING: CPT

## 2024-10-10 PROCEDURE — 63600175 PHARM REV CODE 636 W HCPCS: Performed by: PHYSICIAN ASSISTANT

## 2024-10-10 PROCEDURE — 63600175 PHARM REV CODE 636 W HCPCS

## 2024-10-10 PROCEDURE — 25000003 PHARM REV CODE 250

## 2024-10-10 PROCEDURE — 80053 COMPREHEN METABOLIC PANEL: CPT | Performed by: PHYSICIAN ASSISTANT

## 2024-10-10 RX ORDER — ONDANSETRON 4 MG/1
4 TABLET, FILM COATED ORAL EVERY 6 HOURS
Qty: 12 TABLET | Refills: 0 | Status: ON HOLD | OUTPATIENT
Start: 2024-10-10 | End: 2024-10-18

## 2024-10-10 RX ORDER — KETOROLAC TROMETHAMINE 30 MG/ML
15 INJECTION, SOLUTION INTRAMUSCULAR; INTRAVENOUS
Status: COMPLETED | OUTPATIENT
Start: 2024-10-10 | End: 2024-10-10

## 2024-10-10 RX ORDER — TAMSULOSIN HYDROCHLORIDE 0.4 MG/1
0.4 CAPSULE ORAL DAILY
Qty: 10 CAPSULE | Refills: 0 | Status: ON HOLD | OUTPATIENT
Start: 2024-10-10 | End: 2024-10-18

## 2024-10-10 RX ORDER — OXYCODONE AND ACETAMINOPHEN 7.5; 325 MG/1; MG/1
1 TABLET ORAL EVERY 6 HOURS PRN
Qty: 12 TABLET | Refills: 0 | Status: ON HOLD | OUTPATIENT
Start: 2024-10-10 | End: 2024-10-18

## 2024-10-10 RX ORDER — ONDANSETRON HYDROCHLORIDE 2 MG/ML
4 INJECTION, SOLUTION INTRAVENOUS
Status: COMPLETED | OUTPATIENT
Start: 2024-10-10 | End: 2024-10-10

## 2024-10-10 RX ORDER — AMPICILLIN 500 MG/1
500 CAPSULE ORAL EVERY 6 HOURS
Qty: 28 CAPSULE | Refills: 0 | Status: ON HOLD | OUTPATIENT
Start: 2024-10-10 | End: 2024-10-18 | Stop reason: HOSPADM

## 2024-10-10 RX ORDER — OXYCODONE AND ACETAMINOPHEN 7.5; 325 MG/1; MG/1
1 TABLET ORAL EVERY 4 HOURS PRN
Status: DISCONTINUED | OUTPATIENT
Start: 2024-10-10 | End: 2024-10-11 | Stop reason: HOSPADM

## 2024-10-10 RX ADMIN — AMPICILLIN 1 G: 1 INJECTION, POWDER, FOR SOLUTION INTRAMUSCULAR; INTRAVENOUS at 07:10

## 2024-10-10 RX ADMIN — KETOROLAC TROMETHAMINE 15 MG: 30 INJECTION, SOLUTION INTRAMUSCULAR; INTRAVENOUS at 05:10

## 2024-10-10 RX ADMIN — OXYCODONE HYDROCHLORIDE AND ACETAMINOPHEN 1 TABLET: 7.5; 325 TABLET ORAL at 08:10

## 2024-10-10 RX ADMIN — ONDANSETRON 4 MG: 2 INJECTION INTRAMUSCULAR; INTRAVENOUS at 05:10

## 2024-10-10 NOTE — ED PROVIDER NOTES
Encounter Date: 10/10/2024       History     Chief Complaint   Patient presents with    Flank Pain     Pt presents c/o RLQ pain that radiates around to flank, hx of kidney stones, followed by urology. Denies N/V/D     Josselyn Tarango is a 59 y.o. female who has a past medical history of Diabetes mellitus and Digestive disorder. presenting to the Emergency Department for right-sided flank pain.  Patient reports she has had multiple kidney stones in the last few months.  She reports right flank pain started yesterday and wraps around the right lower abdomen.  She has had some nausea but no vomiting.  She does have some dysuria.  No gross hematuria.  She also reports some indigestion.  Reports she has been feeling so often she doesn't always notice it.  Denies shortness or breath, cough, upper respiratory symptoms.  Reports normal bowel movements.        The history is provided by the patient.     Review of patient's allergies indicates:   Allergen Reactions    Sulfa (sulfonamide antibiotics)      Past Medical History:   Diagnosis Date    Diabetes mellitus     diet control    Digestive disorder     Hx: gastric ulcer     Past Surgical History:   Procedure Laterality Date    CYSTOSCOPY W/ URETERAL STENT PLACEMENT Left 6/7/2024    Procedure: CYSTOSCOPY, left retrograde pyelogram, left JJ stent;  Surgeon: Humza Saenz MD;  Location: [facility] OR;  Service: Urology;  Laterality: Left;  MAC vs choice    CYSTOURETEROSCOPY, WITH HOLMIUM LASER LITHOTRIPSY OF URETERAL CALCULUS AND STENT INSERTION Left 6/17/2024    Procedure: Cystoscopy, left retrograde pyelogram, left ureteroscopy with holmium laser lithotripsy, stone basket extraction, left JJ stent;  Surgeon: Humza Saenz MD;  Location: [facility] OR;  Service: Urology;  Laterality: Left;  Element    EXTRACTION - STONE Left 6/17/2024    Procedure: EXTRACTION - STONE;  Surgeon: Humza Saenz MD;  Location: [facility] OR;  Service: Urology;  Laterality: Left;     "RETROGRADE PYELOGRAPHY N/A 6/7/2024    Procedure: PYELOGRAM, RETROGRADE;  Surgeon: Humza Saenz MD;  Location: [facility] OR;  Service: Urology;  Laterality: N/A;    RETROGRADE PYELOGRAPHY N/A 6/17/2024    Procedure: PYELOGRAM, RETROGRADE;  Surgeon: Humza Saenz MD;  Location: [facility] OR;  Service: Urology;  Laterality: N/A;     No family history on file.  Social History     Tobacco Use    Smoking status: Every Day     Types: Cigarettes    Smokeless tobacco: Never     Review of Systems   Constitutional:  Negative for chills, diaphoresis and fever.   Respiratory:  Negative for cough and shortness of breath.    Cardiovascular:  Positive for chest pain ("indigestion").   Gastrointestinal:  Positive for abdominal pain and nausea. Negative for vomiting.   Genitourinary:  Positive for dysuria and flank pain. Negative for difficulty urinating and hematuria.   Neurological:  Negative for headaches.   Psychiatric/Behavioral:  Negative for agitation and confusion.        Physical Exam     Initial Vitals [10/10/24 1446]   BP Pulse Resp Temp SpO2   (!) 149/87 82 18 98.1 °F (36.7 °C) 98 %      MAP       --         Physical Exam    Nursing note and vitals reviewed.  Constitutional: She appears well-developed and well-nourished. She is not diaphoretic.  Non-toxic appearance. No distress.   HENT:   Head: Normocephalic and atraumatic.   Right Ear: Hearing and external ear normal.   Left Ear: Hearing and external ear normal.   Eyes: EOM are normal.   Neck: Neck supple.   Normal range of motion.  Cardiovascular:  Normal rate and regular rhythm.           Pulmonary/Chest: Breath sounds normal. No respiratory distress. She has no wheezes.   Abdominal: Abdomen is soft. She exhibits no distension. There is abdominal tenderness (right abdominal lumbar).   R CVA tenderness There is no rebound and no guarding.   Musculoskeletal:         General: Normal range of motion.      Cervical back: Normal range of motion and neck supple. Normal " range of motion.     Neurological: She is alert and oriented to person, place, and time. GCS score is 15. GCS eye subscore is 4. GCS verbal subscore is 5. GCS motor subscore is 6.   Skin: Skin is warm and dry.   Psychiatric: She has a normal mood and affect. Her behavior is normal. Judgment and thought content normal.         ED Course   Procedures  Labs Reviewed   CBC W/ AUTO DIFFERENTIAL - Abnormal       Result Value    WBC 7.27      RBC 4.77      Hemoglobin 14.0      Hematocrit 41.8      MCV 88      MCH 29.4      MCHC 33.5      RDW 16.5 (*)     Platelets 293      MPV 9.8      Immature Granulocytes 0.3      Gran # (ANC) 4.4      Immature Grans (Abs) 0.02      Lymph # 2.3      Mono # 0.4      Eos # 0.1      Baso # 0.05      nRBC 0      Gran % 60.5      Lymph % 31.6      Mono % 5.1      Eosinophil % 1.8      Basophil % 0.7      Differential Method Automated     COMPREHENSIVE METABOLIC PANEL - Abnormal    Sodium 141      Potassium 3.3 (*)     Chloride 107      CO2 23      Glucose 82      BUN 7      Creatinine 1.0      Calcium 9.5      Total Protein 7.3      Albumin 3.6      Total Bilirubin 0.3      Alkaline Phosphatase 102      AST 12      ALT 8 (*)     eGFR >60      Anion Gap 11     URINALYSIS, REFLEX TO URINE CULTURE - Abnormal    Specimen UA Urine, Clean Catch      Color, UA Colorless (*)     Appearance, UA Clear      pH, UA 6.0      Specific Gravity, UA <1.005 (*)     Protein, UA Negative      Glucose, UA Negative      Ketones, UA Negative      Bilirubin (UA) Negative      Occult Blood UA 1+ (*)     Nitrite, UA Negative      Urobilinogen, UA Negative      Leukocytes, UA 2+ (*)     Narrative:     Specimen Source->Urine   URINALYSIS MICROSCOPIC - Abnormal    RBC, UA 1      WBC, UA 17 (*)     Bacteria Moderate (*)     Squam Epithel, UA 2      Microscopic Comment SEE COMMENT      Narrative:     Specimen Source->Urine   CULTURE, URINE    Urine Culture, Routine        Value: Multiple organisms isolated. None in  predominance.  Repeat if    Urine Culture, Routine clinically necessary.      Narrative:     Specimen Source->Urine   LIPASE    Lipase 24     TROPONIN I   TROPONIN I    Troponin I <0.006            Imaging Results              CT Renal Stone Study ABD Pelvis WO (Final result)  Result time 10/10/24 18:23:58      Final result by Al Santos MD (10/10/24 18:23:58)                   Impression:      1. There is mild prominence of the right renal collecting system/ureter secondary to a 5-6 mm calculus within the distal aspect of the right ureter.  Correlation with urinalysis recommended to exclude superimposed infection.  2. Additional bilateral nonobstructive nephrolithiasis.  3. Please see above for several additional findings.      Electronically signed by: Al Santos MD  Date:    10/10/2024  Time:    18:23               Narrative:    EXAMINATION:  CT RENAL STONE STUDY ABD PELVIS WO    CLINICAL HISTORY:  Flank pain, kidney stone suspected;    TECHNIQUE:  Low dose axial images, sagittal and coronal reformations were obtained from the lung bases to the pubic symphysis.  Contrast was not administered.    COMPARISON:  07/13/2024    FINDINGS:  Images of the lower thorax are remarkable for minimal dependent atelectasis.    The liver, spleen, and pancreas are grossly unremarkable noting vague hypodensity within the right hepatic lobe appears stable.  Please see exam 07/13/2024..  There is questionable high attenuating layering material within the gallbladder, could reflect calculi or sludge.  The stomach is decompressed without wall thickening.  The common duct is mildly prominent measuring 8 mm however tapers normally toward the ampulla without intraluminal filling defect.  There is low attenuating thickening of the right adrenal gland.  There are low attenuating nodules arising from the left adrenal gland, measuring 2.3 cm and 3.4 cm with attenuation suggesting adenoma.  No significant abdominal  lymphadenopathy.    There is bilateral nonobstructive nephrolithiasis.  A cyst arises from the interpolar region of the right kidney measuring 5.1 cm.  There is mild prominence of the right ureter.  There is a 5-6 mm calculus within the distal aspect of the right ureter.  The left ureter is unremarkable.  There is a small focus of air within the urinary bladder, may reflect sequela of catheterization, correlation is advised.  The uterus is absent the adnexa is unremarkable.    There are a few scattered colonic diverticula without inflammation.  The terminal ileum is unremarkable.  The appendix is not confidently identified, no pericecal inflammation.  The small bowel is grossly unremarkable.  There is atherosclerotic calcification of the aorta and its branches noting mild infrarenal abdominal aortic ectasia.  There is a left fat containing sidewall hernia without inflammation.    There is osteopenia.  There are degenerative changes of the bilateral sacroiliac joints and spine.  No significant inguinal lymphadenopathy.                                       Medications   ketorolac injection 15 mg (15 mg Intravenous Given 10/10/24 1729)   ondansetron injection 4 mg (4 mg Intravenous Given 10/10/24 1730)   ampicillin (OMNIPEN) 1 g in 0.9% NaCl 100 mL IVPB (MB+) (0 g Intravenous Stopped 10/10/24 1955)     Medical Decision Making  59-year-old female presents right flank and right-sided abdominal pain that started yesterday and worsened.  Significant history of kidney stones.  She does have some associated urinary symptoms.  She is mildly hypertensive otherwise with normal vitals, afebrile.  She appears to be in pain, however is not toxic in appearance.  Does not appear septic.  She does have CVA tenderness. Belly labs including troponin given patient's reported indigestion. Will rule out ACS with troponin, EKG. CT renal study. Pain control    Differential Diagnosis includes, but is not limited to:  UTI/pyelonephritis,  kidney stone, urinary retention, urethritis, appendicitis, prostatitis, testicular torsion/mass, incarcerated/strangulated hernia.        Problems Addressed:  Indigestion: acute illness or injury  Kidney stone on right side: acute illness or injury    Amount and/or Complexity of Data Reviewed  Labs:  Decision-making details documented in ED Course.  Radiology: ordered. Decision-making details documented in ED Course.    Risk  Prescription drug management.               ED Course as of 10/12/24 0138   u Oct 10, 2024   1700 BP(!): 149/87 [CS]   1700 Temp: 98.1 °F (36.7 °C) [CS]   1700 Pulse: 82 [CS]   1700 Resp: 18 [CS]   1700 SpO2: 98 % [CS]   1700 Urinalysis, Reflex to Urine Culture Urine, Clean Catch(!)  2+ leuks, moderate bacteria. Nitrite negative. 1+ occult blood.  [CS]   1737 CBC auto differential(!)  No leukocytosis. Normal platelets. No anemia [CS]   1821 Troponin I: <0.006  negative [CS]   1821 Lipase: 24  negative [CS]   1821 Comprehensive metabolic panel(!)  Mild hypokalemia.  Otherwise normal lytes.  No significant renal hepatic abnormality [CS]   1830 CT Renal Stone Study ABD Pelvis WO  1. There is mild prominence of the right renal collecting system/ureter secondary to a 5-6 mm calculus within the distal aspect of the right ureter.  Correlation with urinalysis recommended to exclude superimposed infection.  2. Additional bilateral nonobstructive nephrolithiasis.  3. Please see above for several additional findings.   [CS]   1842 Discussed patient with urology Dr. Saenz. R sided kidney stone within the distal aspect of the right ureter. No fever, leukocytosis. Normal renal function. She does have mild UTI, 2+ leuks, 17 WBC. Nitrite negative. Agrees with ampicillin based on last two positive cultures. Pain controlled, okay to discharge. Outpatient follow up tomorrow.     All results were discussed with patient and she is agreeable to this plan.  All questions were answered.  Strict ED return  precautions discussed with patient for severe pain, fever, intractable nausea vomiting    Care and management of this patient was additionally discussed with my attending Dr. Mcdaniels. [CS]      ED Course User Index  [CS] Giovanna Bob PA-C                           Clinical Impression:  Final diagnoses:  [K30] Indigestion  [N20.0] Kidney stone on right side (Primary)          ED Disposition Condition    Discharge Stable          ED Prescriptions       Medication Sig Dispense Start Date End Date Auth. Provider    tamsulosin (FLOMAX) 0.4 mg Cap Take 1 capsule (0.4 mg total) by mouth once daily. for 10 days 10 capsule 10/10/2024 10/20/2024 Giovanna Bob PA-C    oxyCODONE-acetaminophen (PERCOCET) 7.5-325 mg per tablet Take 1 tablet by mouth every 6 (six) hours as needed for Pain. 12 tablet 10/10/2024 -- Giovanna Bob PA-C    ondansetron (ZOFRAN) 4 MG tablet Take 1 tablet (4 mg total) by mouth every 6 (six) hours. 12 tablet 10/10/2024 -- Giovanna Bob PA-C    ampicillin (PRINCIPEN) 500 MG capsule Take 1 capsule (500 mg total) by mouth every 6 (six) hours. for 7 days 28 capsule 10/10/2024 10/17/2024 Giovanna Bob PA-C          Follow-up Information       Follow up With Specialties Details Why Contact Info    Humza Saenz MD Urology Schedule an appointment as soon as possible for a visit   [address] [facility] [place]  [address]  [facility] [address] [phone]               Giovanna Bob PA-C  10/12/24 0138

## 2024-10-10 NOTE — ED NOTES
Pt states she feels like she might have kidney stones again. Pt has a hx of kidney stones, and she states it feels like they might be back. Pt c/o of  RLQ pain that radiates to her flank. Pt denies N/V. Pt states she does have diarrhea.

## 2024-10-10 NOTE — ED NOTES
Rounded on pt. Pt laying in bed with eyes closed, chest rising and falling equally. Pt states her pain has decreased to an 8/10,. Pt denies any other complaints at this time

## 2024-10-11 ENCOUNTER — OFFICE VISIT (OUTPATIENT)
Dept: UROLOGY | Facility: CLINIC | Age: 59
End: 2024-10-11
Payer: MEDICAID

## 2024-10-11 DIAGNOSIS — N20.1 RIGHT URETERAL CALCULUS: ICD-10-CM

## 2024-10-11 DIAGNOSIS — N20.1 URETERAL STONE: Primary | ICD-10-CM

## 2024-10-11 DIAGNOSIS — N20.0 NEPHROLITHIASIS: ICD-10-CM

## 2024-10-11 LAB
BACTERIA UR CULT: NORMAL
BACTERIA UR CULT: NORMAL

## 2024-10-11 RX ORDER — ACETAMINOPHEN 500 MG
1000 TABLET ORAL
OUTPATIENT
Start: 2024-10-11

## 2024-10-11 RX ORDER — CEFAZOLIN SODIUM 2 G/50ML
2 SOLUTION INTRAVENOUS
OUTPATIENT
Start: 2024-10-11

## 2024-10-11 RX ORDER — LIDOCAINE HYDROCHLORIDE 10 MG/ML
1 INJECTION, SOLUTION EPIDURAL; INFILTRATION; INTRACAUDAL; PERINEURAL ONCE
OUTPATIENT
Start: 2024-10-11 | End: 2024-10-11

## 2024-10-11 NOTE — DISCHARGE INSTRUCTIONS
Start taking antibiotics as prescribed, and continue taking medication until the entire prescription has been completed.  Avoid using drugs/alcohol while on antibiotics, and for the next 72 hours after finishing the prescription.  Obtain an appointment or return to the ED for any symptoms of worsening infection, including fever, nausea/vomiting or inability to take the medication, antibiotic side effects, allergic reaction, or any other concerns.    Please follow up with Urology Dr. Saenz. I did speak with him in the ER and he knows you have another kidney stone    Take the antibiotics as prescribed. Take percocet as needed for pain and zofran as needed for nausea. Take the flomax daily to increase the stream of your urine. Drink plenty of fluids.

## 2024-10-11 NOTE — H&P (VIEW-ONLY)
Established Patient - Audio Only Telehealth Visit     The patient location is:  [place], [place]  The chief complaint leading to consultation is:  ER visit  Visit type: Virtual visit with audio only (telephone)  Total time spent with patient:  5       The reason for the audio only service rather than synchronous audio and video virtual visit was related to technical difficulties or patient preference/necessity.     Each patient to whom I provide medical services by telemedicine is:  (1) informed of the relationship between the physician and patient and the respective role of any other health care provider with respect to management of the patient; and (2) notified that they may decline to receive medical services by telemedicine and may withdraw from such care at any time. Patient verbally consented to receive this service via voice-only telephone call.       HPI:  See below     Assessment and plan:  See below          This service was not originating from a related E/M service provided within the previous 7 days nor will  to an E/M service or procedure within the next 24 hours or my soonest available appointment.  Prevailing standard of care was able to be met in this audio-only visit.        Subjective:       Patient ID: Josselyn Tarango is a 59 y.o. female.    Chief Complaint: No chief complaint on file.     This is a 59 y.o.  female patient that is an established patient of mine.    Left 7mm distal ureteral stone, kidney stones on CT 6/2024, concern for UTI.    Left stent 6/7/24 then left URS/HLL/SBE/stent 6/17/24.    Stent removed 6/25/24.     Never follow up for ultrasound as ordered after left-sided ureteroscopy.  Was seen in the emergency department 07/2024 with right-sided flank pain, CT scan at that time showed multiple stones in the right distal ureter, she reportedly passed and with the stones.  No follow up after above ED visit.  Emergency department 10/10/2024 with a right-sided flank pain, CT  scan with right 6 mm distal ureteral stone with hydronephrosis.  She has had a few urinary tract infections recently.  She denies fever or chills.  Her white blood cell count was normal emergency department her urinalysis showed some leukocytes and some white blood cells, culture is pending.  She has continued to have right-sided flank pain with some nausea and vomiting.          Lab Results   Component Value Date    CREATININE 1.0 10/10/2024       ---  PMH/PSH/Medications/Allergies/Social history reviewed and as in chart.    Review of Systems   Constitutional:  Negative for activity change, chills, fatigue and fever.   Respiratory:  Negative for cough, chest tightness and shortness of breath.    Cardiovascular:  Negative for chest pain.   Gastrointestinal:  Negative for abdominal distention, abdominal pain, nausea and vomiting.   Genitourinary:  Positive for flank pain. Negative for difficulty urinating, hematuria, pelvic pain and vaginal pain.   Musculoskeletal:  Negative for back pain and gait problem.       Objective:      Physical Exam  HENT:      Head: Normocephalic.   Pulmonary:      Effort: Pulmonary effort is normal.   Abdominal:      General: Abdomen is flat.   Musculoskeletal:      Cervical back: Normal range of motion.   Skin:     General: Skin is warm.   Neurological:      General: No focal deficit present.      Mental Status: She is alert.         Assessment:     Problem Noted   Left Ureteral Stone 6/7/2024    Left 7 mm distal ureteral stone, bilateral nonobstructing stones.  Left stent 6/7/24 then left URS/HLL/SBE/stent 6/17/24--stone analysis pending.     Nephrolithiasis 6/7/2024    Bilateral non obstructing stones         Plan:     Discussed options including observation, medical expulsive therapy, ureteroscopy.  Patient wishes to have stone treated given her pain.  We will plan for procedure on Monday.  Discussed with the patient if culture is positive we will just place a stent and allow treatment  of her infection.  If culture is negative we will proceed with ureteroscopy on the right.  Stressed importance of follow up for her to try to prevent stones in the future.  She is not follow up in the past after procedures.      I have explained the indication, risks, benefits, and alternatives of the procedure in detail.  Risks including but not limited to bleeding, infection, injury to the urethra, bladder, ureter, need for additional treatments, inability or incomplete removal of kidney stones, pain, and discomfort related to the stent were discussed in depth with the patient.  The patient voices understanding and all questions have been answered.  The patient agrees to proceed as planned with right ureteroscopy, laser lithotripsy, and ureteral stent placement.    Humza Saenz MD    The above referenced imaging and interpretations were personally reviewed.  Disclaimer: This note has been generated using voice-recognition software. There may be typographical errors that have been missed during proof-reading.

## 2024-10-11 NOTE — PROGRESS NOTES
Established Patient - Audio Only Telehealth Visit     The patient location is:  [place], [place]  The chief complaint leading to consultation is:  ER visit  Visit type: Virtual visit with audio only (telephone)  Total time spent with patient:  5       The reason for the audio only service rather than synchronous audio and video virtual visit was related to technical difficulties or patient preference/necessity.     Each patient to whom I provide medical services by telemedicine is:  (1) informed of the relationship between the physician and patient and the respective role of any other health care provider with respect to management of the patient; and (2) notified that they may decline to receive medical services by telemedicine and may withdraw from such care at any time. Patient verbally consented to receive this service via voice-only telephone call.       HPI:  See below     Assessment and plan:  See below          This service was not originating from a related E/M service provided within the previous 7 days nor will  to an E/M service or procedure within the next 24 hours or my soonest available appointment.  Prevailing standard of care was able to be met in this audio-only visit.        Subjective:       Patient ID: Josselyn Tarango is a 59 y.o. female.    Chief Complaint: No chief complaint on file.     This is a 59 y.o.  female patient that is an established patient of mine.    Left 7mm distal ureteral stone, kidney stones on CT 6/2024, concern for UTI.    Left stent 6/7/24 then left URS/HLL/SBE/stent 6/17/24.    Stent removed 6/25/24.     Never follow up for ultrasound as ordered after left-sided ureteroscopy.  Was seen in the emergency department 07/2024 with right-sided flank pain, CT scan at that time showed multiple stones in the right distal ureter, she reportedly passed and with the stones.  No follow up after above ED visit.  Emergency department 10/10/2024 with a right-sided flank pain, CT  scan with right 6 mm distal ureteral stone with hydronephrosis.  She has had a few urinary tract infections recently.  She denies fever or chills.  Her white blood cell count was normal emergency department her urinalysis showed some leukocytes and some white blood cells, culture is pending.  She has continued to have right-sided flank pain with some nausea and vomiting.          Lab Results   Component Value Date    CREATININE 1.0 10/10/2024       ---  PMH/PSH/Medications/Allergies/Social history reviewed and as in chart.    Review of Systems   Constitutional:  Negative for activity change, chills, fatigue and fever.   Respiratory:  Negative for cough, chest tightness and shortness of breath.    Cardiovascular:  Negative for chest pain.   Gastrointestinal:  Negative for abdominal distention, abdominal pain, nausea and vomiting.   Genitourinary:  Positive for flank pain. Negative for difficulty urinating, hematuria, pelvic pain and vaginal pain.   Musculoskeletal:  Negative for back pain and gait problem.       Objective:      Physical Exam  HENT:      Head: Normocephalic.   Pulmonary:      Effort: Pulmonary effort is normal.   Abdominal:      General: Abdomen is flat.   Musculoskeletal:      Cervical back: Normal range of motion.   Skin:     General: Skin is warm.   Neurological:      General: No focal deficit present.      Mental Status: She is alert.         Assessment:     Problem Noted   Left Ureteral Stone 6/7/2024    Left 7 mm distal ureteral stone, bilateral nonobstructing stones.  Left stent 6/7/24 then left URS/HLL/SBE/stent 6/17/24--stone analysis pending.     Nephrolithiasis 6/7/2024    Bilateral non obstructing stones         Plan:     Discussed options including observation, medical expulsive therapy, ureteroscopy.  Patient wishes to have stone treated given her pain.  We will plan for procedure on Monday.  Discussed with the patient if culture is positive we will just place a stent and allow treatment  of her infection.  If culture is negative we will proceed with ureteroscopy on the right.  Stressed importance of follow up for her to try to prevent stones in the future.  She is not follow up in the past after procedures.      I have explained the indication, risks, benefits, and alternatives of the procedure in detail.  Risks including but not limited to bleeding, infection, injury to the urethra, bladder, ureter, need for additional treatments, inability or incomplete removal of kidney stones, pain, and discomfort related to the stent were discussed in depth with the patient.  The patient voices understanding and all questions have been answered.  The patient agrees to proceed as planned with right ureteroscopy, laser lithotripsy, and ureteral stent placement.    Humza Saenz MD    The above referenced imaging and interpretations were personally reviewed.  Disclaimer: This note has been generated using voice-recognition software. There may be typographical errors that have been missed during proof-reading.

## 2024-10-13 ENCOUNTER — HOSPITAL ENCOUNTER (EMERGENCY)
Facility: HOSPITAL | Age: 59
Discharge: HOME | End: 2024-10-14
Attending: STUDENT IN AN ORGANIZED HEALTH CARE EDUCATION/TRAINING PROGRAM
Payer: MEDICAID

## 2024-10-13 DIAGNOSIS — N20.1 URETEROLITHIASIS: Primary | ICD-10-CM

## 2024-10-13 LAB
ALBUMIN SERPL BCP-MCNC: 3.8 G/DL (ref 3.5–5.2)
ALP SERPL-CCNC: 100 U/L (ref 55–135)
ALT SERPL W/O P-5'-P-CCNC: 13 U/L (ref 10–44)
ANION GAP SERPL CALC-SCNC: 11 MMOL/L (ref 8–16)
AST SERPL-CCNC: 13 U/L (ref 10–40)
BACTERIA #/AREA URNS HPF: ABNORMAL /HPF
BILIRUB SERPL-MCNC: 0.3 MG/DL (ref 0.1–1)
BILIRUB UR QL STRIP: NEGATIVE
BUN SERPL-MCNC: 6 MG/DL (ref 6–20)
CALCIUM SERPL-MCNC: 9.6 MG/DL (ref 8.7–10.5)
CHLORIDE SERPL-SCNC: 107 MMOL/L (ref 95–110)
CLARITY UR: CLEAR
CO2 SERPL-SCNC: 24 MMOL/L (ref 23–29)
COLOR UR: COLORLESS
CREAT SERPL-MCNC: 1 MG/DL (ref 0.5–1.4)
EST. GFR  (NO RACE VARIABLE): >60 ML/MIN/1.73 M^2
GLUCOSE SERPL-MCNC: 80 MG/DL (ref 70–110)
GLUCOSE UR QL STRIP: NEGATIVE
HGB UR QL STRIP: ABNORMAL
KETONES UR QL STRIP: NEGATIVE
LEUKOCYTE ESTERASE UR QL STRIP: ABNORMAL
MICROSCOPIC COMMENT: ABNORMAL
NITRITE UR QL STRIP: NEGATIVE
PH UR STRIP: 6 [PH] (ref 5–8)
POTASSIUM SERPL-SCNC: 3.4 MMOL/L (ref 3.5–5.1)
PROT SERPL-MCNC: 7.7 G/DL (ref 6–8.4)
PROT UR QL STRIP: NEGATIVE
RBC #/AREA URNS HPF: 1 /HPF (ref 0–4)
SODIUM SERPL-SCNC: 142 MMOL/L (ref 136–145)
SP GR UR STRIP: 1 (ref 1–1.03)
SQUAMOUS #/AREA URNS HPF: 1 /HPF
URN SPEC COLLECT METH UR: ABNORMAL
UROBILINOGEN UR STRIP-ACNC: NEGATIVE EU/DL
WBC #/AREA URNS HPF: 18 /HPF (ref 0–5)

## 2024-10-13 PROCEDURE — 96374 THER/PROPH/DIAG INJ IV PUSH: CPT

## 2024-10-13 PROCEDURE — 81000 URINALYSIS NONAUTO W/SCOPE: CPT | Performed by: STUDENT IN AN ORGANIZED HEALTH CARE EDUCATION/TRAINING PROGRAM

## 2024-10-13 PROCEDURE — 85025 COMPLETE CBC W/AUTO DIFF WBC: CPT | Performed by: STUDENT IN AN ORGANIZED HEALTH CARE EDUCATION/TRAINING PROGRAM

## 2024-10-13 PROCEDURE — 63600175 PHARM REV CODE 636 W HCPCS: Performed by: STUDENT IN AN ORGANIZED HEALTH CARE EDUCATION/TRAINING PROGRAM

## 2024-10-13 PROCEDURE — 87086 URINE CULTURE/COLONY COUNT: CPT | Performed by: STUDENT IN AN ORGANIZED HEALTH CARE EDUCATION/TRAINING PROGRAM

## 2024-10-13 PROCEDURE — 80053 COMPREHEN METABOLIC PANEL: CPT | Performed by: STUDENT IN AN ORGANIZED HEALTH CARE EDUCATION/TRAINING PROGRAM

## 2024-10-13 PROCEDURE — 99284 EMERGENCY DEPT VISIT MOD MDM: CPT | Mod: 25

## 2024-10-13 PROCEDURE — 96375 TX/PRO/DX INJ NEW DRUG ADDON: CPT

## 2024-10-13 RX ORDER — MORPHINE SULFATE 4 MG/ML
4 INJECTION, SOLUTION INTRAMUSCULAR; INTRAVENOUS EVERY 4 HOURS PRN
Status: DISCONTINUED | OUTPATIENT
Start: 2024-10-14 | End: 2024-10-14 | Stop reason: HOSPADM

## 2024-10-13 RX ORDER — ONDANSETRON HYDROCHLORIDE 2 MG/ML
4 INJECTION, SOLUTION INTRAVENOUS EVERY 6 HOURS PRN
Status: DISCONTINUED | OUTPATIENT
Start: 2024-10-13 | End: 2024-10-14 | Stop reason: HOSPADM

## 2024-10-13 RX ORDER — FENTANYL CITRATE 50 UG/ML
50 INJECTION, SOLUTION INTRAMUSCULAR; INTRAVENOUS
Status: COMPLETED | OUTPATIENT
Start: 2024-10-13 | End: 2024-10-13

## 2024-10-13 RX ADMIN — FENTANYL CITRATE 50 MCG: 50 INJECTION INTRAMUSCULAR; INTRAVENOUS at 11:10

## 2024-10-13 RX ADMIN — ONDANSETRON 4 MG: 2 INJECTION INTRAMUSCULAR; INTRAVENOUS at 11:10

## 2024-10-14 ENCOUNTER — ANESTHESIA (OUTPATIENT)
Dept: SURGERY | Facility: HOSPITAL | Age: 59
End: 2024-10-14
Payer: MEDICAID

## 2024-10-14 ENCOUNTER — HOSPITAL ENCOUNTER (OUTPATIENT)
Facility: HOSPITAL | Age: 59
Discharge: HOME | End: 2024-10-14
Attending: UROLOGY | Admitting: UROLOGY
Payer: MEDICAID

## 2024-10-14 ENCOUNTER — ANESTHESIA EVENT (OUTPATIENT)
Dept: SURGERY | Facility: HOSPITAL | Age: 59
End: 2024-10-14
Payer: MEDICAID

## 2024-10-14 VITALS
HEIGHT: 68 IN | TEMPERATURE: 98 F | WEIGHT: 200 LBS | OXYGEN SATURATION: 100 % | BODY MASS INDEX: 30.31 KG/M2 | SYSTOLIC BLOOD PRESSURE: 117 MMHG | RESPIRATION RATE: 18 BRPM | HEART RATE: 74 BPM | DIASTOLIC BLOOD PRESSURE: 70 MMHG

## 2024-10-14 VITALS
DIASTOLIC BLOOD PRESSURE: 72 MMHG | BODY MASS INDEX: 30.31 KG/M2 | HEIGHT: 68 IN | WEIGHT: 200 LBS | RESPIRATION RATE: 16 BRPM | TEMPERATURE: 98 F | OXYGEN SATURATION: 99 % | SYSTOLIC BLOOD PRESSURE: 153 MMHG | HEART RATE: 79 BPM

## 2024-10-14 DIAGNOSIS — N20.1 URETERAL STONE: ICD-10-CM

## 2024-10-14 DIAGNOSIS — N20.1 RIGHT URETERAL CALCULUS: Primary | ICD-10-CM

## 2024-10-14 DIAGNOSIS — N20.0 NEPHROLITHIASIS: ICD-10-CM

## 2024-10-14 LAB
BASOPHILS # BLD AUTO: 0.03 K/UL (ref 0–0.2)
BASOPHILS NFR BLD: 0.4 % (ref 0–1.9)
DIFFERENTIAL METHOD BLD: ABNORMAL
EOSINOPHIL # BLD AUTO: 0.2 K/UL (ref 0–0.5)
EOSINOPHIL NFR BLD: 2.8 % (ref 0–8)
ERYTHROCYTE [DISTWIDTH] IN BLOOD BY AUTOMATED COUNT: 16.5 % (ref 11.5–14.5)
HCT VFR BLD AUTO: 42.9 % (ref 37–48.5)
HGB BLD-MCNC: 14.3 G/DL (ref 12–16)
IMM GRANULOCYTES # BLD AUTO: 0.02 K/UL (ref 0–0.04)
IMM GRANULOCYTES NFR BLD AUTO: 0.3 % (ref 0–0.5)
LYMPHOCYTES # BLD AUTO: 2.5 K/UL (ref 1–4.8)
LYMPHOCYTES NFR BLD: 34.7 % (ref 18–48)
MCH RBC QN AUTO: 29.2 PG (ref 27–31)
MCHC RBC AUTO-ENTMCNC: 33.3 G/DL (ref 32–36)
MCV RBC AUTO: 88 FL (ref 82–98)
MONOCYTES # BLD AUTO: 0.4 K/UL (ref 0.3–1)
MONOCYTES NFR BLD: 5.4 % (ref 4–15)
NEUTROPHILS # BLD AUTO: 4.1 K/UL (ref 1.8–7.7)
NEUTROPHILS NFR BLD: 56.4 % (ref 38–73)
NRBC BLD-RTO: 0 /100 WBC
PLATELET # BLD AUTO: 265 K/UL (ref 150–450)
PLATELET BLD QL SMEAR: ABNORMAL
PMV BLD AUTO: 10.5 FL (ref 9.2–12.9)
POCT GLUCOSE: 102 MG/DL (ref 70–110)
RBC # BLD AUTO: 4.89 M/UL (ref 4–5.4)
SPECIMEN SOURCE: NORMAL
WBC # BLD AUTO: 7.26 K/UL (ref 3.9–12.7)

## 2024-10-14 PROCEDURE — C2617 STENT, NON-COR, TEM W/O DEL: HCPCS | Performed by: UROLOGY

## 2024-10-14 PROCEDURE — 25000003 PHARM REV CODE 250: Performed by: ANESTHESIOLOGY

## 2024-10-14 PROCEDURE — 82365 CALCULUS SPECTROSCOPY: CPT | Performed by: UROLOGY

## 2024-10-14 PROCEDURE — 0T768DZ DILATION OF RIGHT URETER WITH INTRALUMINAL DEVICE, VIA NATURAL OR ARTIFICIAL OPENING ENDOSCOPIC: ICD-10-PCS | Performed by: UROLOGY

## 2024-10-14 PROCEDURE — C1769 GUIDE WIRE: HCPCS | Performed by: UROLOGY

## 2024-10-14 PROCEDURE — 36000706: Performed by: UROLOGY

## 2024-10-14 PROCEDURE — 63600175 PHARM REV CODE 636 W HCPCS: Performed by: ANESTHESIOLOGY

## 2024-10-14 PROCEDURE — 36000707: Performed by: UROLOGY

## 2024-10-14 PROCEDURE — 96376 TX/PRO/DX INJ SAME DRUG ADON: CPT

## 2024-10-14 PROCEDURE — 25000003 PHARM REV CODE 250: Performed by: UROLOGY

## 2024-10-14 PROCEDURE — 63600175 PHARM REV CODE 636 W HCPCS: Performed by: NURSE ANESTHETIST, CERTIFIED REGISTERED

## 2024-10-14 PROCEDURE — 71000016 HC POSTOP RECOV ADDL HR: Performed by: UROLOGY

## 2024-10-14 PROCEDURE — 71000015 HC POSTOP RECOV 1ST HR: Performed by: UROLOGY

## 2024-10-14 PROCEDURE — 37000009 HC ANESTHESIA EA ADD 15 MINS: Performed by: UROLOGY

## 2024-10-14 PROCEDURE — 63600175 PHARM REV CODE 636 W HCPCS: Performed by: STUDENT IN AN ORGANIZED HEALTH CARE EDUCATION/TRAINING PROGRAM

## 2024-10-14 PROCEDURE — 25500020 PHARM REV CODE 255: Performed by: UROLOGY

## 2024-10-14 PROCEDURE — 0TC38ZZ EXTIRPATION OF MATTER FROM RIGHT KIDNEY PELVIS, VIA NATURAL OR ARTIFICIAL OPENING ENDOSCOPIC: ICD-10-PCS | Performed by: UROLOGY

## 2024-10-14 PROCEDURE — C1758 CATHETER, URETERAL: HCPCS | Performed by: UROLOGY

## 2024-10-14 PROCEDURE — 71000033 HC RECOVERY, INTIAL HOUR: Performed by: UROLOGY

## 2024-10-14 PROCEDURE — C1773 RET DEV, INSERTABLE: HCPCS | Performed by: UROLOGY

## 2024-10-14 PROCEDURE — 37000008 HC ANESTHESIA 1ST 15 MINUTES: Performed by: UROLOGY

## 2024-10-14 PROCEDURE — 0TCB8ZZ EXTIRPATION OF MATTER FROM BLADDER, VIA NATURAL OR ARTIFICIAL OPENING ENDOSCOPIC: ICD-10-PCS | Performed by: UROLOGY

## 2024-10-14 PROCEDURE — 52356 CYSTO/URETERO W/LITHOTRIPSY: CPT | Mod: RT,,, | Performed by: UROLOGY

## 2024-10-14 PROCEDURE — 27201423 OPTIME MED/SURG SUP & DEVICES STERILE SUPPLY: Performed by: UROLOGY

## 2024-10-14 DEVICE — URETERAL STENT
Type: IMPLANTABLE DEVICE | Site: URETER | Status: FUNCTIONAL
Brand: POLARIS™ ULTRA

## 2024-10-14 RX ORDER — AMPICILLIN 500 MG/1
500 CAPSULE ORAL EVERY 6 HOURS
Qty: 12 CAPSULE | Refills: 0 | Status: ON HOLD | OUTPATIENT
Start: 2024-10-14 | End: 2024-10-18 | Stop reason: HOSPADM

## 2024-10-14 RX ORDER — ACETAMINOPHEN 500 MG
1000 TABLET ORAL
Status: COMPLETED | OUTPATIENT
Start: 2024-10-14 | End: 2024-10-14

## 2024-10-14 RX ORDER — LIDOCAINE HYDROCHLORIDE 10 MG/ML
1 INJECTION, SOLUTION EPIDURAL; INFILTRATION; INTRACAUDAL; PERINEURAL ONCE
Status: DISCONTINUED | OUTPATIENT
Start: 2024-10-14 | End: 2024-10-14 | Stop reason: HOSPADM

## 2024-10-14 RX ORDER — TAMSULOSIN HYDROCHLORIDE 0.4 MG/1
0.4 CAPSULE ORAL NIGHTLY
Qty: 30 CAPSULE | Refills: 0 | Status: ON HOLD | OUTPATIENT
Start: 2024-10-14 | End: 2024-10-18

## 2024-10-14 RX ORDER — HYDROMORPHONE HYDROCHLORIDE 2 MG/ML
0.2 INJECTION, SOLUTION INTRAMUSCULAR; INTRAVENOUS; SUBCUTANEOUS EVERY 5 MIN PRN
Status: DISCONTINUED | OUTPATIENT
Start: 2024-10-14 | End: 2024-10-14 | Stop reason: HOSPADM

## 2024-10-14 RX ORDER — CEFAZOLIN SODIUM 1 G/3ML
INJECTION, POWDER, FOR SOLUTION INTRAMUSCULAR; INTRAVENOUS
Status: DISCONTINUED | OUTPATIENT
Start: 2024-10-14 | End: 2024-10-14

## 2024-10-14 RX ORDER — OXYCODONE AND ACETAMINOPHEN 5; 325 MG/1; MG/1
1 TABLET ORAL EVERY 4 HOURS PRN
Qty: 5 TABLET | Refills: 0 | Status: ON HOLD | OUTPATIENT
Start: 2024-10-14 | End: 2024-10-18

## 2024-10-14 RX ORDER — PROCHLORPERAZINE EDISYLATE 5 MG/ML
5 INJECTION INTRAMUSCULAR; INTRAVENOUS EVERY 30 MIN PRN
Status: DISCONTINUED | OUTPATIENT
Start: 2024-10-14 | End: 2024-10-14 | Stop reason: HOSPADM

## 2024-10-14 RX ORDER — PHENAZOPYRIDINE HYDROCHLORIDE 100 MG/1
100 TABLET, FILM COATED ORAL 3 TIMES DAILY PRN
Qty: 21 TABLET | Refills: 0 | Status: ON HOLD | OUTPATIENT
Start: 2024-10-14 | End: 2024-10-18

## 2024-10-14 RX ORDER — OXYBUTYNIN CHLORIDE 5 MG/1
5 TABLET ORAL 3 TIMES DAILY
Qty: 21 TABLET | Refills: 0 | Status: ON HOLD | OUTPATIENT
Start: 2024-10-14 | End: 2024-10-18

## 2024-10-14 RX ORDER — ONDANSETRON HYDROCHLORIDE 2 MG/ML
INJECTION, SOLUTION INTRAVENOUS
Status: DISCONTINUED | OUTPATIENT
Start: 2024-10-14 | End: 2024-10-14

## 2024-10-14 RX ORDER — FENTANYL CITRATE 50 UG/ML
INJECTION, SOLUTION INTRAMUSCULAR; INTRAVENOUS
Status: DISCONTINUED | OUTPATIENT
Start: 2024-10-14 | End: 2024-10-14

## 2024-10-14 RX ORDER — OXYCODONE HYDROCHLORIDE 5 MG/1
5 TABLET ORAL
Status: DISCONTINUED | OUTPATIENT
Start: 2024-10-14 | End: 2024-10-14 | Stop reason: HOSPADM

## 2024-10-14 RX ORDER — DEXAMETHASONE SODIUM PHOSPHATE 4 MG/ML
INJECTION, SOLUTION INTRA-ARTICULAR; INTRALESIONAL; INTRAMUSCULAR; INTRAVENOUS; SOFT TISSUE
Status: DISCONTINUED | OUTPATIENT
Start: 2024-10-14 | End: 2024-10-14

## 2024-10-14 RX ORDER — VASOPRESSIN 20 [USP'U]/ML
INJECTION, SOLUTION INTRAMUSCULAR; SUBCUTANEOUS
Status: DISCONTINUED | OUTPATIENT
Start: 2024-10-14 | End: 2024-10-14

## 2024-10-14 RX ORDER — LIDOCAINE HYDROCHLORIDE 20 MG/ML
INJECTION INTRAVENOUS
Status: DISCONTINUED | OUTPATIENT
Start: 2024-10-14 | End: 2024-10-14

## 2024-10-14 RX ORDER — KETOROLAC TROMETHAMINE 30 MG/ML
INJECTION, SOLUTION INTRAMUSCULAR; INTRAVENOUS
Status: DISCONTINUED | OUTPATIENT
Start: 2024-10-14 | End: 2024-10-14

## 2024-10-14 RX ORDER — PROPOFOL 10 MG/ML
VIAL (ML) INTRAVENOUS
Status: DISCONTINUED | OUTPATIENT
Start: 2024-10-14 | End: 2024-10-14

## 2024-10-14 RX ORDER — KETOROLAC TROMETHAMINE 10 MG/1
10 TABLET, FILM COATED ORAL EVERY 6 HOURS PRN
Qty: 12 TABLET | Refills: 0 | Status: ON HOLD | OUTPATIENT
Start: 2024-10-14 | End: 2024-10-18

## 2024-10-14 RX ORDER — SODIUM CHLORIDE 0.9 % (FLUSH) 0.9 %
10 SYRINGE (ML) INJECTION
Status: DISCONTINUED | OUTPATIENT
Start: 2024-10-14 | End: 2024-10-14 | Stop reason: HOSPADM

## 2024-10-14 RX ORDER — PHENYLEPHRINE HYDROCHLORIDE 10 MG/ML
INJECTION INTRAVENOUS
Status: DISCONTINUED | OUTPATIENT
Start: 2024-10-14 | End: 2024-10-14

## 2024-10-14 RX ADMIN — GLYCOPYRROLATE 0.2 MG: 0.2 INJECTION, SOLUTION INTRAMUSCULAR; INTRAVITREAL at 07:10

## 2024-10-14 RX ADMIN — MORPHINE SULFATE 4 MG: 4 INJECTION INTRAVENOUS at 03:10

## 2024-10-14 RX ADMIN — HYDROMORPHONE HYDROCHLORIDE 0.2 MG: 2 INJECTION, SOLUTION INTRAMUSCULAR; INTRAVENOUS; SUBCUTANEOUS at 08:10

## 2024-10-14 RX ADMIN — VASOPRESSIN 1 UNITS: 20 INJECTION INTRAVENOUS at 07:10

## 2024-10-14 RX ADMIN — PROPOFOL 200 MG: 10 INJECTION, EMULSION INTRAVENOUS at 07:10

## 2024-10-14 RX ADMIN — KETOROLAC TROMETHAMINE 15 MG: 30 INJECTION, SOLUTION INTRAMUSCULAR; INTRAVENOUS at 07:10

## 2024-10-14 RX ADMIN — FENTANYL CITRATE 50 MCG: 0.05 INJECTION, SOLUTION INTRAMUSCULAR; INTRAVENOUS at 07:10

## 2024-10-14 RX ADMIN — DEXAMETHASONE SODIUM PHOSPHATE 4 MG: 4 INJECTION, SOLUTION INTRA-ARTICULAR; INTRALESIONAL; INTRAMUSCULAR; INTRAVENOUS; SOFT TISSUE at 07:10

## 2024-10-14 RX ADMIN — CEFAZOLIN 2 G: 330 INJECTION, POWDER, FOR SOLUTION INTRAMUSCULAR; INTRAVENOUS at 07:10

## 2024-10-14 RX ADMIN — MORPHINE SULFATE 4 MG: 4 INJECTION INTRAVENOUS at 12:10

## 2024-10-14 RX ADMIN — ONDANSETRON 4 MG: 2 INJECTION, SOLUTION INTRAMUSCULAR; INTRAVENOUS at 07:10

## 2024-10-14 RX ADMIN — VASOPRESSIN 2 UNITS: 20 INJECTION INTRAVENOUS at 07:10

## 2024-10-14 RX ADMIN — PHENYLEPHRINE HYDROCHLORIDE 200 MCG: 10 INJECTION INTRAVENOUS at 07:10

## 2024-10-14 RX ADMIN — ACETAMINOPHEN 1000 MG: 500 TABLET ORAL at 06:10

## 2024-10-14 RX ADMIN — LIDOCAINE HYDROCHLORIDE 100 MG: 20 INJECTION, SOLUTION INTRAVENOUS at 07:10

## 2024-10-14 RX ADMIN — OXYCODONE 5 MG: 5 TABLET ORAL at 08:10

## 2024-10-14 NOTE — ANESTHESIA PREPROCEDURE EVALUATION
"                                                                                                             10/14/2024  Josselyn Tarango is a 59 y.o., female here for a cyso with laser lithotripsy of ureteral stone with stent insertion.       Pre-op Assessment    I have reviewed the Patient Summary Reports.    I have reviewed the NPO Status.   I have reviewed the Medications.     Review of Systems  Anesthesia Hx:  No problems with previous Anesthesia               Denies Personal Hx of Anesthesia complications.                    Social:  Smoker       Renal/:   renal calculi               Hepatic/GI:     GERD, poorly controlled   States she has had new onset acid reflux since kidney stones with daily symptoms. Takes an OTC "bubbly supplement" to help but it does not properly relieve her symptoms          Endocrine:  Diabetes         Obesity / BMI > 30      Physical Exam  General: Well nourished, Cooperative, Alert and Oriented    Airway:  Mallampati: II   Mouth Opening: Normal  TM Distance: Normal  Tongue: Normal  Neck ROM: Normal ROM    Dental:  Dentures    Chest/Lungs:  Clear to auscultation, Normal Respiratory Rate    Heart:  Rate: Normal  Rhythm: Regular Rhythm        Anesthesia Plan  Type of Anesthesia, risks & benefits discussed:    Anesthesia Type: Gen ETT  Intra-op Monitoring Plan: Standard ASA Monitors  Post Op Pain Control Plan: multimodal analgesia  Induction:  IV  Airway Plan: Video and Direct, Post-Induction  Informed Consent: Informed consent signed with the Patient and all parties understand the risks and agree with anesthesia plan.  All questions answered.   ASA Score: 2  Anesthesia Plan Notes: Because of her poorly controlled GERD with symptoms this morning, we will perform an RSI with ET tube placement to reduce risk of aspiration instead of placing an lma. Discussed this with the patient and she is understanding and agreeable.     Ready For Surgery From Anesthesia Perspective.     .      "

## 2024-10-14 NOTE — DISCHARGE INSTRUCTIONS
Postoperative Instructions for Stone Surgery    1. Ureteral Stent:     - You have a ureteral stent in place with strings. You may remove your stent on Thursday (10/17/24).     - To remove your stent, it is recommended that you do so in the shower or while urinating. To remove the stent, gently grasp the strings hanging outside of your urethra and slowly and gently pull downward until the stent is completely removed. The ureteral stent is approximately 12 inches in length.    2. Hydration and Fluid Intake:     - It is essential to drink plenty of fluids following the ureteroscopy procedure to promote flushing of the urinary system and prevent dehydration.     - Aim to drink at least 8 to 10 glasses of water or other non-caffeinated, non-alcoholic beverages daily, unless instructed otherwise by your healthcare provider.    3. Pain Management:     - You may experience mild to moderate discomfort or pain after the ureteroscopy procedure. This is usually manageable with over-the-counter pain relievers such as acetaminophen or nonsteroidal anti-inflammatory drugs (NSAIDs).     - You may experience mild bladder and flank discomfort especially when voiding due to your ureteral stent. This may be alleviated with medications that were prescribed to you such as oxybutynin and/or pyridium. You may take these as needed for any urinary discomfort while a stent. Please note this discomfort is temporary and should subside once the stent is removed.     - If prescribed pain medication, take it as instructed, following the prescribed dosage and frequency.    4. Urinary Symptoms:     - It is common to experience some urinary symptoms after the procedure, such as urgency, frequency, burning sensation, or blood in the urine (hematuria). These symptoms should gradually improve within a few days.     - If the symptoms worsen or if you notice significant blood clots or inability to urinate, contact your healthcare provider.    5. Activity  and Rest:     - It is advisable to take it easy and get plenty of rest for the first few days following the procedure. Avoid strenuous activities, heavy lifting, or vigorous exercise for 1-2 days.     - Gradually resume normal activities as you feel comfortable, but listen to your body and avoid overexertion.    6. Diet and Nutrition:     - Follow any dietary recommendations provided by your healthcare provider. In general, maintain a balanced diet with adequate fiber intake to prevent constipation.     - Avoid excessive consumption of salt, spicy foods, and caffeinated or carbonated beverages, as these may irritate the urinary system.    7. Follow-up Appointments:     - Attend all scheduled follow-up appointments with your healthcare provider to monitor your recovery and assess the success of the stone removal.     - You will be contacted via phone or the patient portal about any follow up appointments and tests/imaging in about 1-2 weeks.     - Additional imaging or laboratory tests may be ordered to evaluate the effectiveness of the procedure.    8. Signs of Complications:     - Be aware of potential complications and contact your healthcare provider if you experience any of the following: persistent or worsening pain, fever, chills, excessive fatigue, severe bleeding, inability to pass urine, or signs of infection.    9. Medication Compliance:     - If prescribed any medications, such as antibiotics or medications to prevent stone recurrence, take them as instructed by your healthcare provider. Follow the recommended dosage and complete the full course of medication.    If you have any additional questions, call [phone] and ask for your provider. If after hours (night or weekends) ask for urology on call and you will be directed to the appropriate provider.     .leigh

## 2024-10-14 NOTE — DISCHARGE SUMMARY
[facility] (Hospital)  Discharge Note  Short Stay    Procedure(s) (LRB):  CYSTOURETEROSCOPY, WITH HOLMIUM LASER LITHOTRIPSY OF URETERAL CALCULUS AND STENT INSERTION (Right)  PYELOGRAM, RETROGRADE      OUTCOME: Patient tolerated treatment/procedure well without complication and is now ready for discharge.    DISPOSITION: Home or Self Care    FINAL DIAGNOSIS:  Nephrolithiasis    FOLLOWUP: In clinic    DISCHARGE INSTRUCTIONS:    Discharge Procedure Orders   US Kidney   Standing Status: Future Standing Exp. Date: 10/14/25     Order Specific Question Answer Comments   May the Radiologist modify the order per protocol to meet the clinical needs of the patient? Yes    Release to patient Immediate      No dressing needed     Notify your health care provider if you experience any of the following:  temperature >100.4     Notify your health care provider if you experience any of the following:  persistent nausea and vomiting or diarrhea     Notify your health care provider if you experience any of the following:  severe uncontrolled pain     Notify your health care provider if you experience any of the following:  difficulty breathing or increased cough     Notify your health care provider if you experience any of the following:  severe persistent headache     Notify your health care provider if you experience any of the following:  worsening rash     Notify your health care provider if you experience any of the following:  persistent dizziness, light-headedness, or visual disturbances     Activity as tolerated        TIME SPENT ON DISCHARGE: 15 minutes

## 2024-10-14 NOTE — PLAN OF CARE
Patient has met PACU discharge criteria, VSS, pain well controlled. Released from PACU per protocol.

## 2024-10-14 NOTE — ANESTHESIA PROCEDURE NOTES
Intubation    Date/Time: 10/14/2024 7:09 AM    Performed by: Bakari Francois Jr. CRNA  Authorized by: Lorrie Ratliff MD    Intubation:     Induction:  Intravenous    Intubated:  Postinduction    Mask Ventilation:  Not attempted    Attempts:  1    Attempted By:  CRNA, student and staff anesthesiologist    Method of Intubation:  Video laryngoscopy    Blade:  Lemon 3    Laryngeal View Grade: Grade I - full view of cords      Difficult Airway Encountered?: No      Complications:  None    Airway Device:  Oral endotracheal tube    Airway Device Size:  7.0    Style/Cuff Inflation:  Cuffed (inflated to minimal occlusive pressure)    Tube secured:  21    Secured at:  The teeth    Placement Verified By:  Capnometry and Revisualization with laryngoscopy    Complicating Factors:  None    Findings Post-Intubation:  BS equal bilateral

## 2024-10-14 NOTE — TRANSFER OF CARE
"Anesthesia Transfer of Care Note    Patient: Josselyn Tarango    Procedure(s) Performed: Procedure(s) (LRB):  CYSTOURETEROSCOPY, WITH HOLMIUM LASER LITHOTRIPSY OF URETERAL CALCULUS AND STENT INSERTION (Right)  PYELOGRAM, RETROGRADE    Patient location: PACU    Anesthesia Type: general    Transport from OR: Transported from OR on 6-10 L/min O2 by face mask with adequate spontaneous ventilation    Post pain: adequate analgesia    Post assessment: no apparent anesthetic complications and tolerated procedure well    Post vital signs: stable    Level of consciousness: awake    Nausea/Vomiting: no nausea/vomiting    Complications: none    Transfer of care protocol was followed      Last vitals: Visit Vitals  /64 (BP Location: Right arm, Patient Position: Lying)   Pulse 87   Temp 36.7 °C (98.1 °F) (Tympanic)   Resp 16   Ht 5' 8" (1.727 m)   Wt 90.7 kg (200 lb)   SpO2 96%   Breastfeeding No   BMI 30.41 kg/m²     "

## 2024-10-14 NOTE — OP NOTE
[facility]  Operative Note    Date: 10/14/2024    Pre-Op Diagnosis:  Right ureteral stone    Patient Active Problem List    Diagnosis Date Noted    Hydronephrosis 06/07/2024    Type 2 diabetes mellitus, without long-term current use of insulin 06/07/2024    Nephrolithiasis 06/07/2024    Adrenal adenoma 06/07/2024    Left ureteral stone 06/07/2024       Post-Op Diagnosis:  Same    Procedure(s) Performed:   1. Right ureteroscopy with laser lithotripsy and stone basket extraction  2. Cystoscopy with right retrograde pyelogram  3. Right ureteral stent placement  4. Pyeloscopy  5. Fluoro < 1 hr    Specimen(s):  Right ureteral stent    Staff Surgeon:  Humza Saenz MD    Assistant Surgeon: Bran Gil MD    Anesthesia: General endotracheal anesthesia    Indications: Josselyn Tarango is a 59 y.o. female with a right ureteral stone presenting for definitive stone management. She is not pre-stented.    Findings:  1. Stone visible on  film.  2. No pyonephrosis noted upon cannulation of the right ureteral orifice.  Right retrograde pyelogram without significant hydronephrosis and without any extravasation of contrast, ureter normal in course and caliber  3. Stone encountered in the distal right ureter, lasered and extracted to completion  4. Stone encountered in the lower pole of the right kidney, extracted to completion  5. Stent placed under fluoroscopy in good position    Estimated Blood Loss: min    Drains:   1. Right 6 Fr x 26 cm JJ ureteral stent with strings    Procedure in detail:  After risks, benefits, and possible complications were explained, the patient elected to undergo the procedure and informed consent was obtained. All questions were answered in the marifer-operative area. The patient was transferred to the cystoscopy suite and placed in the supine position. SCDs were applied and working. Anesthesia was administered. The patient was then placed in the dorsal lithotomy position  and prepped and draped in the usual sterile fashion. Time out was performed, and marifer-procedural antibiotics were confirmed.     The stone was visible on  film. A rigid cystoscope in a 22 Fr sheath was introduced into the patient's urethra. This passed easily. The entire urethra was visualized which showed no strictures or masses. Cystoscopy revealed the ureteral orifices in the normal anatomic location bilaterally.    A motion wire was passed up the right ureteral orifice and up into the kidney. This passed easily and placement was confirmed fluoroscopically. The cystoscope was removed keeping the wire in place.    An 8 Fr rigid ureteroscope was passed into the patient's bladder alongside the wire under direct vision. It was then passed through the right ureteral orifice alongside the wire. A stone was encountered at the level of the distal ureter.  A 200 micron holmium laser fiber was passed through the ureteroscope. The stone was fragmented/dusted\ using the laser. The laser fiber was removed and an NCircle basket was introduced through the ureteroscope. Stone fragments were removed and placed in the bladder. The ureteroscope was reinserted and the entire length of the ureter was surveyed and all remaining stone fragments were deemed small enough to pass spontaneously, <1 mm.     An 8 Fr flexible ureteroscope was advanced to the level of the right renal pelvis under continuous fluoroscopy. This passed easily. Stone was encountered in lower pole. An NCircle basket was inserted per the scope and the fragment was removed and passed off the field for analysis. Systematic pyeloscopy was performed and no additional stone fragments were visualized.  The scope was then removed from the patient's body leaving the motion wire in place.    The cystoscope was reinserted and the bladder was irrigated to remove the stone fragments. The bladder was drained and the cystoscope removed keeping the wire in place.    A 6 Fr x  26 cm JJ ureteral stent with strings was passed over the wire and up into the renal pelvis using fluoro. When the coil appeared to be in good position in the kidney and the radio-opaque marker of the pusher was at the inferior pubis, the wire was removed under continuous fluoro. Good coils were seen in the kidney and the bladder using fluoro.    The patient tolerated the procedure well and was transferred to the recovery room in stable condition.    Disposition:  The patient will be discharged home from PACU. She follow up with Dr. Saenz  in 1 month with a renal ultrasound prior. She was given written instructions to self-remove her ureteral stent with strings on Thursday, 10/17/2024.    Bran Gil MD     I was present and scrubbed for the entirety of the procedure.  I agree with the operative note and have edited as needed.    Humza Saenz MD

## 2024-10-15 ENCOUNTER — HOSPITAL ENCOUNTER (INPATIENT)
Facility: HOSPITAL | Age: 59
LOS: 3 days | Discharge: HOME HEALTH | DRG: 853 | End: 2024-10-18
Attending: STUDENT IN AN ORGANIZED HEALTH CARE EDUCATION/TRAINING PROGRAM | Admitting: FAMILY MEDICINE
Payer: COMMERCIAL

## 2024-10-15 DIAGNOSIS — N20.0 KIDNEY STONE ON RIGHT SIDE: ICD-10-CM

## 2024-10-15 DIAGNOSIS — Z87.442 HISTORY OF KIDNEY STONES: ICD-10-CM

## 2024-10-15 DIAGNOSIS — A41.9 SEPSIS, DUE TO UNSPECIFIED ORGANISM, UNSPECIFIED WHETHER ACUTE ORGAN DYSFUNCTION PRESENT: Primary | ICD-10-CM

## 2024-10-15 DIAGNOSIS — R65.10 SIRS (SYSTEMIC INFLAMMATORY RESPONSE SYNDROME): ICD-10-CM

## 2024-10-15 DIAGNOSIS — N20.0 NEPHROLITHIASIS: Chronic | ICD-10-CM

## 2024-10-15 DIAGNOSIS — N17.9 AKI (ACUTE KIDNEY INJURY): ICD-10-CM

## 2024-10-15 PROBLEM — E87.6 HYPOKALEMIA: Status: ACTIVE | Noted: 2024-10-15

## 2024-10-15 PROBLEM — R13.10 DYSPHAGIA: Status: ACTIVE | Noted: 2024-10-15

## 2024-10-15 PROBLEM — R73.03 PRE-DIABETES: Status: ACTIVE | Noted: 2024-10-15

## 2024-10-15 PROBLEM — F17.200 TOBACCO DEPENDENCY: Status: ACTIVE | Noted: 2024-10-15

## 2024-10-15 PROBLEM — F17.210 DEPENDENCE ON NICOTINE FROM CIGARETTES: Status: ACTIVE | Noted: 2024-10-15

## 2024-10-15 LAB
ALBUMIN SERPL BCP-MCNC: 3.5 G/DL (ref 3.5–5.2)
ALP SERPL-CCNC: 95 U/L (ref 55–135)
ALT SERPL W/O P-5'-P-CCNC: 10 U/L (ref 10–44)
ANION GAP SERPL CALC-SCNC: 16 MMOL/L (ref 8–16)
AST SERPL-CCNC: 11 U/L (ref 10–40)
BACTERIA #/AREA URNS HPF: ABNORMAL /HPF
BACTERIA UR CULT: NORMAL
BACTERIA UR CULT: NORMAL
BASOPHILS # BLD AUTO: 0.02 K/UL (ref 0–0.2)
BASOPHILS NFR BLD: 0.1 % (ref 0–1.9)
BILIRUB SERPL-MCNC: 0.2 MG/DL (ref 0.1–1)
BILIRUB UR QL STRIP: NEGATIVE
BUN SERPL-MCNC: 6 MG/DL (ref 6–20)
CALCIUM SERPL-MCNC: 10 MG/DL (ref 8.7–10.5)
CAOX CRY URNS QL MICRO: ABNORMAL
CHLORIDE SERPL-SCNC: 108 MMOL/L (ref 95–110)
CLARITY UR: ABNORMAL
CO2 SERPL-SCNC: 18 MMOL/L (ref 23–29)
COLOR UR: YELLOW
CREAT SERPL-MCNC: 1.2 MG/DL (ref 0.5–1.4)
DIFFERENTIAL METHOD BLD: ABNORMAL
EOSINOPHIL # BLD AUTO: 0 K/UL (ref 0–0.5)
EOSINOPHIL NFR BLD: 0 % (ref 0–8)
ERYTHROCYTE [DISTWIDTH] IN BLOOD BY AUTOMATED COUNT: 16.4 % (ref 11.5–14.5)
EST. GFR  (NO RACE VARIABLE): 52 ML/MIN/1.73 M^2
FIO2: 21 %
FIO2: 21 %
GLUCOSE SERPL-MCNC: 108 MG/DL (ref 70–110)
GLUCOSE UR QL STRIP: ABNORMAL
HCT VFR BLD AUTO: 41.7 % (ref 37–48.5)
HGB BLD-MCNC: 14.1 G/DL (ref 12–16)
HGB UR QL STRIP: ABNORMAL
HYALINE CASTS #/AREA URNS LPF: 0 /LPF
IMM GRANULOCYTES # BLD AUTO: 0.09 K/UL (ref 0–0.04)
IMM GRANULOCYTES NFR BLD AUTO: 0.5 % (ref 0–0.5)
INFLUENZA A, MOLECULAR: NEGATIVE
INFLUENZA B, MOLECULAR: NEGATIVE
KETONES UR QL STRIP: ABNORMAL
LACTATE SERPL-SCNC: 2.9 MMOL/L (ref 0.5–2.2)
LDH SERPL L TO P-CCNC: 2.3 MMOL/L (ref 0.5–2.2)
LEUKOCYTE ESTERASE UR QL STRIP: ABNORMAL
LYMPHOCYTES # BLD AUTO: 0.6 K/UL (ref 1–4.8)
LYMPHOCYTES NFR BLD: 3.4 % (ref 18–48)
MAGNESIUM SERPL-MCNC: 1.5 MG/DL (ref 1.6–2.6)
MCH RBC QN AUTO: 29.8 PG (ref 27–31)
MCHC RBC AUTO-ENTMCNC: 33.8 G/DL (ref 32–36)
MCV RBC AUTO: 88 FL (ref 82–98)
MICROSCOPIC COMMENT: ABNORMAL
MONOCYTES # BLD AUTO: 0.7 K/UL (ref 0.3–1)
MONOCYTES NFR BLD: 4.2 % (ref 4–15)
NEUTROPHILS # BLD AUTO: 15.3 K/UL (ref 1.8–7.7)
NEUTROPHILS NFR BLD: 91.8 % (ref 38–73)
NITRITE UR QL STRIP: NEGATIVE
NRBC BLD-RTO: 0 /100 WBC
OHS QRS DURATION: 106 MS
OHS QRS DURATION: 114 MS
OHS QTC CALCULATION: 441 MS
OHS QTC CALCULATION: 461 MS
PCO2 BLDA: 34.6 MMHG (ref 35–45)
PH SMN: 7.44 [PH] (ref 7.35–7.45)
PH UR STRIP: 6 [PH] (ref 5–8)
PLATELET # BLD AUTO: 246 K/UL (ref 150–450)
PMV BLD AUTO: 10.5 FL (ref 9.2–12.9)
PO2 BLDA: 42.1 MMHG (ref 40–60)
POC BASE DEFICIT: 0 MMOL/L (ref -2–2)
POC HCO3: 23.7 MMOL/L (ref 24–28)
POC PERFORMED BY: ABNORMAL
POC PERFORMED BY: ABNORMAL
POC SATURATED O2: 82.5 % (ref 95–100)
POCT GLUCOSE: 167 MG/DL (ref 70–110)
POTASSIUM SERPL-SCNC: 3.2 MMOL/L (ref 3.5–5.1)
PROCALCITONIN SERPL IA-MCNC: 0.04 NG/ML
PROT SERPL-MCNC: 7.4 G/DL (ref 6–8.4)
PROT UR QL STRIP: ABNORMAL
RBC # BLD AUTO: 4.73 M/UL (ref 4–5.4)
RBC #/AREA URNS HPF: >100 /HPF (ref 0–4)
SARS-COV-2 RDRP RESP QL NAA+PROBE: NEGATIVE
SODIUM SERPL-SCNC: 142 MMOL/L (ref 136–145)
SP GR UR STRIP: >1.03 (ref 1–1.03)
SPECIMEN SOURCE: ABNORMAL
SPECIMEN SOURCE: ABNORMAL
SPECIMEN SOURCE: NORMAL
SQUAMOUS #/AREA URNS HPF: 3 /HPF
UNIDENT CRYS URNS QL MICRO: ABNORMAL
URN SPEC COLLECT METH UR: ABNORMAL
UROBILINOGEN UR STRIP-ACNC: ABNORMAL EU/DL
WBC # BLD AUTO: 16.62 K/UL (ref 3.9–12.7)
WBC #/AREA URNS HPF: 20 /HPF (ref 0–5)

## 2024-10-15 PROCEDURE — 87077 CULTURE AEROBIC IDENTIFY: CPT | Performed by: STUDENT IN AN ORGANIZED HEALTH CARE EDUCATION/TRAINING PROGRAM

## 2024-10-15 PROCEDURE — 81000 URINALYSIS NONAUTO W/SCOPE: CPT | Performed by: STUDENT IN AN ORGANIZED HEALTH CARE EDUCATION/TRAINING PROGRAM

## 2024-10-15 PROCEDURE — 25000003 PHARM REV CODE 250

## 2024-10-15 PROCEDURE — 87186 SC STD MICRODIL/AGAR DIL: CPT | Performed by: STUDENT IN AN ORGANIZED HEALTH CARE EDUCATION/TRAINING PROGRAM

## 2024-10-15 PROCEDURE — 83605 ASSAY OF LACTIC ACID: CPT | Performed by: FAMILY MEDICINE

## 2024-10-15 PROCEDURE — 99900035 HC TECH TIME PER 15 MIN (STAT)

## 2024-10-15 PROCEDURE — 63600175 PHARM REV CODE 636 W HCPCS

## 2024-10-15 PROCEDURE — 92610 EVALUATE SWALLOWING FUNCTION: CPT

## 2024-10-15 PROCEDURE — 80053 COMPREHEN METABOLIC PANEL: CPT | Performed by: STUDENT IN AN ORGANIZED HEALTH CARE EDUCATION/TRAINING PROGRAM

## 2024-10-15 PROCEDURE — 93005 ELECTROCARDIOGRAM TRACING: CPT

## 2024-10-15 PROCEDURE — 87154 CUL TYP ID BLD PTHGN 6+ TRGT: CPT | Performed by: STUDENT IN AN ORGANIZED HEALTH CARE EDUCATION/TRAINING PROGRAM

## 2024-10-15 PROCEDURE — S4991 NICOTINE PATCH NONLEGEND: HCPCS | Performed by: FAMILY MEDICINE

## 2024-10-15 PROCEDURE — 25000003 PHARM REV CODE 250: Performed by: STUDENT IN AN ORGANIZED HEALTH CARE EDUCATION/TRAINING PROGRAM

## 2024-10-15 PROCEDURE — 83605 ASSAY OF LACTIC ACID: CPT

## 2024-10-15 PROCEDURE — 87086 URINE CULTURE/COLONY COUNT: CPT | Performed by: STUDENT IN AN ORGANIZED HEALTH CARE EDUCATION/TRAINING PROGRAM

## 2024-10-15 PROCEDURE — 63600175 PHARM REV CODE 636 W HCPCS: Performed by: FAMILY MEDICINE

## 2024-10-15 PROCEDURE — 84145 PROCALCITONIN (PCT): CPT | Performed by: STUDENT IN AN ORGANIZED HEALTH CARE EDUCATION/TRAINING PROGRAM

## 2024-10-15 PROCEDURE — 82803 BLOOD GASES ANY COMBINATION: CPT

## 2024-10-15 PROCEDURE — 93010 ELECTROCARDIOGRAM REPORT: CPT | Mod: ,,, | Performed by: INTERNAL MEDICINE

## 2024-10-15 PROCEDURE — 63600175 PHARM REV CODE 636 W HCPCS: Performed by: UROLOGY

## 2024-10-15 PROCEDURE — 63600175 PHARM REV CODE 636 W HCPCS: Performed by: STUDENT IN AN ORGANIZED HEALTH CARE EDUCATION/TRAINING PROGRAM

## 2024-10-15 PROCEDURE — 96365 THER/PROPH/DIAG IV INF INIT: CPT

## 2024-10-15 PROCEDURE — 87502 INFLUENZA DNA AMP PROBE: CPT | Performed by: STUDENT IN AN ORGANIZED HEALTH CARE EDUCATION/TRAINING PROGRAM

## 2024-10-15 PROCEDURE — 87502 INFLUENZA DNA AMP PROBE: CPT

## 2024-10-15 PROCEDURE — 51798 US URINE CAPACITY MEASURE: CPT

## 2024-10-15 PROCEDURE — 87040 BLOOD CULTURE FOR BACTERIA: CPT | Performed by: STUDENT IN AN ORGANIZED HEALTH CARE EDUCATION/TRAINING PROGRAM

## 2024-10-15 PROCEDURE — 83735 ASSAY OF MAGNESIUM: CPT | Performed by: STUDENT IN AN ORGANIZED HEALTH CARE EDUCATION/TRAINING PROGRAM

## 2024-10-15 PROCEDURE — 85025 COMPLETE CBC W/AUTO DIFF WBC: CPT | Performed by: STUDENT IN AN ORGANIZED HEALTH CARE EDUCATION/TRAINING PROGRAM

## 2024-10-15 PROCEDURE — 97535 SELF CARE MNGMENT TRAINING: CPT

## 2024-10-15 PROCEDURE — 11000001 HC ACUTE MED/SURG PRIVATE ROOM

## 2024-10-15 PROCEDURE — U0002 COVID-19 LAB TEST NON-CDC: HCPCS | Performed by: STUDENT IN AN ORGANIZED HEALTH CARE EDUCATION/TRAINING PROGRAM

## 2024-10-15 PROCEDURE — 99285 EMERGENCY DEPT VISIT HI MDM: CPT | Mod: 25

## 2024-10-15 PROCEDURE — 99222 1ST HOSP IP/OBS MODERATE 55: CPT | Mod: ,,, | Performed by: UROLOGY

## 2024-10-15 PROCEDURE — 25000003 PHARM REV CODE 250: Performed by: FAMILY MEDICINE

## 2024-10-15 RX ORDER — OXYBUTYNIN CHLORIDE 5 MG/1
5 TABLET ORAL 3 TIMES DAILY
Status: DISCONTINUED | OUTPATIENT
Start: 2024-10-15 | End: 2024-10-18 | Stop reason: HOSPADM

## 2024-10-15 RX ORDER — IBUPROFEN 200 MG
1 TABLET ORAL DAILY
Status: DISCONTINUED | OUTPATIENT
Start: 2024-10-15 | End: 2024-10-18 | Stop reason: HOSPADM

## 2024-10-15 RX ORDER — OXYCODONE AND ACETAMINOPHEN 5; 325 MG/1; MG/1
1 TABLET ORAL EVERY 4 HOURS PRN
Status: DISCONTINUED | OUTPATIENT
Start: 2024-10-15 | End: 2024-10-15

## 2024-10-15 RX ORDER — GLUCAGON 1 MG
1 KIT INJECTION
Status: DISCONTINUED | OUTPATIENT
Start: 2024-10-15 | End: 2024-10-18 | Stop reason: HOSPADM

## 2024-10-15 RX ORDER — IBUPROFEN 200 MG
16 TABLET ORAL
Status: DISCONTINUED | OUTPATIENT
Start: 2024-10-15 | End: 2024-10-18 | Stop reason: HOSPADM

## 2024-10-15 RX ORDER — POLYETHYLENE GLYCOL 3350 17 G/17G
17 POWDER, FOR SOLUTION ORAL DAILY
Status: DISCONTINUED | OUTPATIENT
Start: 2024-10-15 | End: 2024-10-18 | Stop reason: HOSPADM

## 2024-10-15 RX ORDER — OXYCODONE AND ACETAMINOPHEN 5; 325 MG/1; MG/1
2 TABLET ORAL EVERY 4 HOURS PRN
Status: DISCONTINUED | OUTPATIENT
Start: 2024-10-15 | End: 2024-10-18 | Stop reason: HOSPADM

## 2024-10-15 RX ORDER — IBUPROFEN 200 MG
24 TABLET ORAL
Status: DISCONTINUED | OUTPATIENT
Start: 2024-10-15 | End: 2024-10-18 | Stop reason: HOSPADM

## 2024-10-15 RX ORDER — TALC
6 POWDER (GRAM) TOPICAL NIGHTLY PRN
Status: DISCONTINUED | OUTPATIENT
Start: 2024-10-15 | End: 2024-10-18 | Stop reason: HOSPADM

## 2024-10-15 RX ORDER — ACETAMINOPHEN 500 MG
1000 TABLET ORAL
Status: COMPLETED | OUTPATIENT
Start: 2024-10-15 | End: 2024-10-15

## 2024-10-15 RX ORDER — ACETAMINOPHEN 325 MG/1
650 TABLET ORAL EVERY 4 HOURS PRN
Status: DISCONTINUED | OUTPATIENT
Start: 2024-10-15 | End: 2024-10-18 | Stop reason: HOSPADM

## 2024-10-15 RX ORDER — ACETAMINOPHEN 325 MG/1
650 TABLET ORAL EVERY 4 HOURS PRN
Status: DISCONTINUED | OUTPATIENT
Start: 2024-10-15 | End: 2024-10-15

## 2024-10-15 RX ORDER — TAMSULOSIN HYDROCHLORIDE 0.4 MG/1
0.4 CAPSULE ORAL DAILY
Status: DISCONTINUED | OUTPATIENT
Start: 2024-10-15 | End: 2024-10-18 | Stop reason: HOSPADM

## 2024-10-15 RX ORDER — DOCUSATE SODIUM 100 MG
600 CAPSULE ORAL
Status: DISCONTINUED | OUTPATIENT
Start: 2024-10-15 | End: 2024-10-18 | Stop reason: HOSPADM

## 2024-10-15 RX ORDER — ONDANSETRON HYDROCHLORIDE 2 MG/ML
4 INJECTION, SOLUTION INTRAVENOUS EVERY 6 HOURS PRN
Status: DISCONTINUED | OUTPATIENT
Start: 2024-10-15 | End: 2024-10-18 | Stop reason: HOSPADM

## 2024-10-15 RX ORDER — HYDROMORPHONE HYDROCHLORIDE 1 MG/ML
0.5 INJECTION, SOLUTION INTRAMUSCULAR; INTRAVENOUS; SUBCUTANEOUS
Status: COMPLETED | OUTPATIENT
Start: 2024-10-15 | End: 2024-10-15

## 2024-10-15 RX ADMIN — PIPERACILLIN SODIUM AND TAZOBACTAM SODIUM 4.5 G: 4; .5 INJECTION, POWDER, LYOPHILIZED, FOR SOLUTION INTRAVENOUS at 04:10

## 2024-10-15 RX ADMIN — POTASSIUM BICARBONATE 25 MEQ: 978 TABLET, EFFERVESCENT ORAL at 12:10

## 2024-10-15 RX ADMIN — OXYCODONE HYDROCHLORIDE AND ACETAMINOPHEN 1 TABLET: 5; 325 TABLET ORAL at 11:10

## 2024-10-15 RX ADMIN — SODIUM CHLORIDE, POTASSIUM CHLORIDE, SODIUM LACTATE AND CALCIUM CHLORIDE 1000 ML: 600; 310; 30; 20 INJECTION, SOLUTION INTRAVENOUS at 05:10

## 2024-10-15 RX ADMIN — TAMSULOSIN HYDROCHLORIDE 0.4 MG: 0.4 CAPSULE ORAL at 09:10

## 2024-10-15 RX ADMIN — OXYBUTYNIN CHLORIDE 5 MG: 5 TABLET ORAL at 08:10

## 2024-10-15 RX ADMIN — POLYETHYLENE GLYCOL 3350 17 G: 17 POWDER, FOR SOLUTION ORAL at 09:10

## 2024-10-15 RX ADMIN — CEFTRIAXONE SODIUM 2 G: 2 INJECTION, POWDER, FOR SOLUTION INTRAMUSCULAR; INTRAVENOUS at 09:10

## 2024-10-15 RX ADMIN — HYDROMORPHONE HYDROCHLORIDE 0.5 MG: 1 INJECTION, SOLUTION INTRAMUSCULAR; INTRAVENOUS; SUBCUTANEOUS at 06:10

## 2024-10-15 RX ADMIN — SODIUM CHLORIDE, POTASSIUM CHLORIDE, SODIUM LACTATE AND CALCIUM CHLORIDE 500 ML: 600; 310; 30; 20 INJECTION, SOLUTION INTRAVENOUS at 11:10

## 2024-10-15 RX ADMIN — VANCOMYCIN HYDROCHLORIDE 2250 MG: 1.25 INJECTION, POWDER, LYOPHILIZED, FOR SOLUTION INTRAVENOUS at 03:10

## 2024-10-15 RX ADMIN — Medication 600 ML: at 04:10

## 2024-10-15 RX ADMIN — Medication 600 ML: at 08:10

## 2024-10-15 RX ADMIN — ACETAMINOPHEN 650 MG: 325 TABLET ORAL at 08:10

## 2024-10-15 RX ADMIN — NICOTINE 1 PATCH: 21 PATCH, EXTENDED RELEASE TRANSDERMAL at 12:10

## 2024-10-15 RX ADMIN — ONDANSETRON 4 MG: 2 INJECTION INTRAMUSCULAR; INTRAVENOUS at 08:10

## 2024-10-15 RX ADMIN — OXYCODONE HYDROCHLORIDE AND ACETAMINOPHEN 2 TABLET: 5; 325 TABLET ORAL at 11:10

## 2024-10-15 RX ADMIN — OXYBUTYNIN CHLORIDE 5 MG: 5 TABLET ORAL at 03:10

## 2024-10-15 RX ADMIN — ACETAMINOPHEN 1000 MG: 500 TABLET ORAL at 05:10

## 2024-10-15 RX ADMIN — SODIUM CHLORIDE 1953 ML: 9 INJECTION, SOLUTION INTRAVENOUS at 04:10

## 2024-10-15 RX ADMIN — OXYBUTYNIN CHLORIDE 5 MG: 5 TABLET ORAL at 09:10

## 2024-10-15 NOTE — HPI
Josselyn Tarango is a 58 y/o F with PMH of prediabetes, diet controlled, nicotine dependence, ureteral lithiasis, s/p ureteral stent placement on 10/14, presents with 1 day history of generalized malaise, with associated chills with rigors, fever, nausea, vomiting, decreased appetite and abdominal pain.  Patient stated symptoms started few hours after her procedure-right ureteral stent placement.  She had received perioperative IV antibiotics, and continued with her p.o. antibiotics at home.  WBC 16.2, H/H 14/41, platelets 246, potassium 3.2, BUN/creatinine 1.2/10, LFT within normal limit chest x-ray unremarkable, CT renal stone study reports no stone bed nonspecific right-sided perinephric inflammatory changes.  Mild urothelial thickening in the visualized ureter of the right.  Patient is started on sepsis protocol, IV fluid and IV antibiotics.  Admit hospital medicine service

## 2024-10-15 NOTE — Clinical Note
Diagnosis: SIRS (systemic inflammatory response syndrome) [969956]   Reason for IP Medical Treatment  (Clinical interventions that can only be accomplished in the IP setting? ) :: sepsis

## 2024-10-15 NOTE — ED NOTES
Pt comes in complaining of fever, chills, n/v, and abdominal pain. Pt had surgery this morning to remove kidney stones. Temp 101.7 during assessment     APPEARANCE: Alert, oriented and in no acute distress.  CARDIAC: Normal rate and rhythm, no murmur heard.   PERIPHERAL VASCULAR: peripheral pulses present. Normal cap refill. No edema. Warm to touch.    RESPIRATORY:Normal rate and effort, breath sounds clear bilaterally throughout chest. Respirations are equal and unlabored no obvious signs of distress.  GASTRO: soft, bowel sounds normal no abdominal distention, tenderness  MUSC: Full ROM. No bony tenderness or soft tissue tenderness. No obvious deformity.  SKIN: Skin is warm and dry, normal skin turgor, mucous membranes moist.  NEURO: 5/5 strength major flexors/extensors bilaterally. Sensory intact to light touch bilaterally. Glenn coma scale: eyes open spontaneously-4, oriented & converses-5, obeys commands-6. No neurological abnormalities.   MENTAL STATUS: awake, alert and aware of environment.  EYE: PERRL, both eyes: pupils brisk and reactive to light. Normal size.  ENT: EARS: no obvious drainage. NOSE: no active bleeding.

## 2024-10-15 NOTE — ED NOTES
Pt seen in room AAOx4, states that she had an operation on 10/13/24 which placed a stent for kidney stones. Since then she states she has had fever, chills, and abdominal pain. States she has had a lot of pain medicine recently and does not want any of the strong IV medications for fear of addiction. Strings from stent visualized, attached to R inner thigh via tegaderm.

## 2024-10-15 NOTE — H&P
[facility] Medicine  History & Physical    Patient Name: Josselyn Tarango  MRN: [id number]  Patient Class: IP- Inpatient  Admission Date: 10/15/2024  Attending Physician: Madelin Hunt MD  Primary Care Provider: Renetta Primary Doctor         Patient information was obtained from patient and ER records.     Subjective:     Principal Problem:Sepsis    Chief Complaint:   Chief Complaint   Patient presents with    Abdominal Pain     [facility] brought in a 60 y/o female with c//o abd pain since yesterday.  S/P Lithotripsy for stones on Monday morning with a urethra stent in place, patient voiding without difficulty.  Also c/o chills today.          HPI: Josselyn Tarango is a 60 y/o F with PMH of prediabetes, diet controlled, nicotine dependence, ureteral lithiasis, s/p ureteral stent placement on 10/14, presents with 1 day history of generalized malaise, with associated chills with rigors, fever, nausea, vomiting, decreased appetite and abdominal pain.  Patient stated symptoms started few hours after her procedure-right ureteral stent placement.  She had received perioperative IV antibiotics, and continued with her p.o. antibiotics at home.  WBC 16.2, H/H 14/41, platelets 246, potassium 3.2, BUN/creatinine 1.2/10, LFT within normal limit chest x-ray unremarkable, CT renal stone study reports no stone bed nonspecific right-sided perinephric inflammatory changes.  Mild urothelial thickening in the visualized ureter of the right.  Patient is started on sepsis protocol, IV fluid and IV antibiotics.  Admit hospital medicine service    Past Medical History:   Diagnosis Date    Diabetes mellitus     diet control    Digestive disorder     Hx: gastric ulcer       Past Surgical History:   Procedure Laterality Date    CYSTOSCOPY W/ URETERAL STENT PLACEMENT Left 6/7/2024    Procedure: CYSTOSCOPY, left retrograde pyelogram, left JJ stent;  Surgeon: Humza Saenz MD;  Location: [facility] OR;   Service: Urology;  Laterality: Left;  MAC vs choice    CYSTOURETEROSCOPY, WITH HOLMIUM LASER LITHOTRIPSY OF URETERAL CALCULUS AND STENT INSERTION Left 6/17/2024    Procedure: Cystoscopy, left retrograde pyelogram, left ureteroscopy with holmium laser lithotripsy, stone basket extraction, left JJ stent;  Surgeon: Humza Saenz MD;  Location: [facility] OR;  Service: Urology;  Laterality: Left;  Element    CYSTOURETEROSCOPY, WITH HOLMIUM LASER LITHOTRIPSY OF URETERAL CALCULUS AND STENT INSERTION Right 10/14/2024    Procedure: CYSTOURETEROSCOPY, WITH HOLMIUM LASER LITHOTRIPSY OF URETERAL CALCULUS AND STENT INSERTION;  Surgeon: Humza Saenz MD;  Location: [facility] OR;  Service: Urology;  Laterality: Right;  LMA    EXTRACTION - STONE Left 6/17/2024    Procedure: EXTRACTION - STONE;  Surgeon: Humza Saenz MD;  Location: [facility] OR;  Service: Urology;  Laterality: Left;    RETROGRADE PYELOGRAPHY N/A 6/7/2024    Procedure: PYELOGRAM, RETROGRADE;  Surgeon: Humza Saenz MD;  Location: [facility] OR;  Service: Urology;  Laterality: N/A;    RETROGRADE PYELOGRAPHY N/A 6/17/2024    Procedure: PYELOGRAM, RETROGRADE;  Surgeon: Humza Saenz MD;  Location: [facility] OR;  Service: Urology;  Laterality: N/A;    RETROGRADE PYELOGRAPHY  10/14/2024    Procedure: PYELOGRAM, RETROGRADE;  Surgeon: Humza Saenz MD;  Location: [facility];  Service: Urology;;       Review of patient's allergies indicates:   Allergen Reactions    Sulfa (sulfonamide antibiotics)        Current Facility-Administered Medications on File Prior to Encounter   Medication    [DISCONTINUED] ceFAZolin 2 g in D5W 50 mL IVPB (MB+)    [DISCONTINUED] HYDROmorphone (PF) injection 0.2 mg    [DISCONTINUED] LIDOcaine (PF) 10 mg/ml (1%) injection 10 mg    [DISCONTINUED] oxyCODONE immediate release tablet 5 mg    [DISCONTINUED] prochlorperazine injection Soln 5 mg    [DISCONTINUED] sodium chloride 0.9% flush 10 mL     Current Outpatient Medications on File  Prior to Encounter   Medication Sig    ampicillin (PRINCIPEN) 500 MG capsule Take 1 capsule (500 mg total) by mouth every 6 (six) hours. for 7 days    ampicillin (PRINCIPEN) 500 MG capsule Take 1 capsule (500 mg total) by mouth every 6 (six) hours. for 3 days    ampicillin (PRINCIPEN) 500 MG capsule Take 1 capsule (500 mg total) by mouth every 6 (six) hours. for 3 days    EPINEPHrine (EPIPEN) 0.3 mg/0.3 mL AtIn Inject 0.3 mLs (0.3 mg total) into the muscle as needed (anaphylaxis).    ketorolac (TORADOL) 10 mg tablet Take 1 tablet (10 mg total) by mouth every 6 (six) hours as needed for Pain.    ondansetron (ZOFRAN) 4 MG tablet Take 1 tablet (4 mg total) by mouth every 6 (six) hours.    ondansetron (ZOFRAN-ODT) 4 MG TbDL Take 1 tablet (4 mg total) by mouth every 6 (six) hours as needed (Nausea).    oxybutynin (DITROPAN) 5 MG Tab Take 1 tablet (5 mg total) by mouth 3 (three) times daily. for 7 days    oxyCODONE-acetaminophen (PERCOCET) 5-325 mg per tablet Take 1 tablet by mouth every 4 (four) hours as needed for Pain.    oxyCODONE-acetaminophen (PERCOCET) 7.5-325 mg per tablet Take 1 tablet by mouth every 6 (six) hours as needed for Pain.    phenazopyridine (PYRIDIUM) 100 MG tablet Take 1 tablet (100 mg total) by mouth 3 (three) times daily as needed for Pain.    tamsulosin (FLOMAX) 0.4 mg Cap Take 1 capsule (0.4 mg total) by mouth once daily. for 10 days    tamsulosin (FLOMAX) 0.4 mg Cap Take 1 capsule (0.4 mg total) by mouth every evening. for 30 doses     Family History    None       Tobacco Use    Smoking status: Every Day     Types: Cigarettes    Smokeless tobacco: Never   Substance and Sexual Activity    Alcohol use: Not on file    Drug use: Not on file    Sexual activity: Not on file     Review of Systems   Constitutional:  Positive for activity change, appetite change, chills, fatigue and fever.   Eyes:  Negative for photophobia and visual disturbance.   Respiratory:  Negative for shortness of breath.   "  Gastrointestinal:  Positive for nausea and vomiting.   Genitourinary:  Positive for flank pain and pelvic pain. Negative for difficulty urinating, dysuria, urgency and vaginal pain.   Musculoskeletal:  Negative for arthralgias.   Skin:  Negative for pallor and rash.   Neurological:  Positive for weakness.   Psychiatric/Behavioral:  Negative for confusion.      Objective:     Vital Signs (Most Recent):  Temp: (!) 100.8 °F (38.2 °C) (10/15/24 0617)  Pulse: 101 (10/15/24 0403)  Resp: 18 (10/15/24 0636)  BP: (!) 152/71 (10/15/24 0403)  SpO2: 97 % (10/15/24 0403) Vital Signs (24h Range):  Temp:  [99.4 °F (37.4 °C)-101.7 °F (38.7 °C)] 100.8 °F (38.2 °C)  Pulse:  [101-128] 101  Resp:  [18-20] 18  SpO2:  [97 %-98 %] 97 %  BP: (152-168)/(71-92) 152/71     Weight: 90.7 kg (200 lb)  Body mass index is 29.97 kg/m².     Physical Exam  HENT:      Head: Normocephalic and atraumatic.      Nose: Nose normal.   Pulmonary:      Effort: Pulmonary effort is normal.   Abdominal:      General: Bowel sounds are normal.      Palpations: Abdomen is soft.      Tenderness: There is abdominal tenderness in the right lower quadrant. There is left CVA tenderness.   Musculoskeletal:      Cervical back: Normal range of motion.      Right lower leg: No edema.      Left lower leg: No edema.   Skin:     Findings: Lesion present.   Neurological:      Mental Status: She is alert.   Psychiatric:         Mood and Affect: Mood normal.              Significant Labs: A1C:   Recent Labs   Lab 06/08/24  0626   HGBA1C 5.3     ABGs:   Recent Labs   Lab 10/15/24  0349   PH 7.444   PCO2 34.6*   HCO3 23.7*   POCSATURATED 82.5*   PO2 42.1     Blood Culture: No results for input(s): "LABBLOO" in the last 48 hours.  CBC:   Recent Labs   Lab 10/13/24  2240 10/15/24  0351   WBC 7.26 16.62*   HGB 14.3 14.1   HCT 42.9 41.7    246     CMP:   Recent Labs   Lab 10/13/24  2240 10/15/24  0351    142   K 3.4* 3.2*    108   CO2 24 18*   GLU 80 108   BUN 6 6 " "  CREATININE 1.0 1.2   CALCIUM 9.6 10.0   PROT 7.7 7.4   ALBUMIN 3.8 3.5   BILITOT 0.3 0.2   ALKPHOS 100 95   AST 13 11   ALT 13 10   ANIONGAP 11 16     Lactic Acid: No results for input(s): "LACTATE" in the last 48 hours.  Lipase: No results for input(s): "LIPASE" in the last 48 hours.  Lipid Panel: No results for input(s): "CHOL", "HDL", "LDLCALC", "TRIG", "CHOLHDL" in the last 48 hours.  Magnesium: No results for input(s): "MG" in the last 48 hours.  Respiratory Culture: No results for input(s): "GSRESP", "RESPIRATORYC" in the last 48 hours.  Troponin: No results for input(s): "TROPONINI", "TROPONINIHS" in the last 48 hours.  TSH: No results for input(s): "TSH" in the last 4320 hours.  Urine Culture: No results for input(s): "LABURIN" in the last 48 hours.  Urine Studies:   Recent Labs   Lab 10/15/24  0906   COLORU Yellow   APPEARANCEUA Hazy*   PHUR 6.0   SPECGRAV >1.030*   PROTEINUA 3+*   GLUCUA Trace*   KETONESU 1+*   BILIRUBINUA Negative   OCCULTUA 3+*   NITRITE Negative   UROBILINOGEN 2.0-3.0*   LEUKOCYTESUR 1+*   RBCUA >100*   WBCUA 20*   BACTERIA Occasional   SQUAMEPITHEL 3   HYALINECASTS 0       Significant Imaging: I have reviewed all pertinent imaging results/findings within the past 24 hours.   Assessment/Plan:     * Sepsis  This patient does have evidence of infective focus  My overall impression is sepsis.  Source: Urinary Tract  Antibiotics given-   Antibiotics (72h ago, onward)      Start     Stop Route Frequency Ordered    10/15/24 0800  cefTRIAXone (ROCEPHIN) 2 g in D5W 100 mL IVPB (MB+)  ( Urinary Tract Infection (UTI))         10/20/24 0759 IV Every 24 hours (non-standard times) 10/15/24 0738          Latest lactate reviewed-  Recent Labs   Lab 10/15/24  0350   POCLAC 2.3*     Organ dysfunction indicated by  none    Fluid challenge Ideal Body Weight- The patient's ideal body weight is Ideal body weight: 65 kg (143 lb 6.5 oz) which will be used to calculate fluid bolus of 30 ml/kg for treatment " of septic shock.      Post- resuscitation assessment Yes Perfusion exam was performed within 6 hours of septic shock presentation after bolus shows Adequate tissue perfusion assessed by invasive monitoring       Will Not start Pressors- Levophed for MAP of 65  Source control achieved by: IV abx        CT abdomen renal protocol  1. On the right, the patient is status post placement of a ureteral stent. Previously seen distal right renal calculus on the 10/10/2024 CT is not definitely appreciated.  Curvilinear calcification in involving right midpole calyx appears similar to prior.  Smaller adjoining punctate calcification/stone on the prior study is not definitely seen on the current study.  Scratch nonspecific right-sided perinephric inflammatory change. Mild urothelial thickening in the visualized upper ureter on the right.  Findings could relate to sequela of recent instrumentation, noting that infectious process would not be excluded in the appropriate clinical context.  2. Indeterminate hypodense lesion right hepatic lobe similar to 10/10/2024 and 07/13/2024 CT.  Further characterization dedicated hepatic mass protocol MRI or CT would be recommended.  3. Details of additional chronic and incidental findings, as provided in the body of repor        Hypokalemia  Patient's most recent potassium results are listed below.   Recent Labs     10/13/24  2240 10/15/24  0351   K 3.4* 3.2*     Plan  - Replete potassium per protocol  - Monitor potassium Daily  - Patient's hypokalemia is improving  -     Pre-diabetes  HbA1c 5.3 four months ago  Continue diet controlled      Dysphagia  Consult SLP      Dependence on nicotine from cigarettes  Dangers of cigarette smoking were reviewed with patient in detail. Patient was Counseled for 3-10 minutes. Nicotine replacement options were discussed. Nicotine replacement was discussed- prescribed    Nephrolithiasis  History of renal stone s/p ureteral stent placement for  ureterolithiasis        VTE Risk Mitigation (From admission, onward)           Ordered     IP VTE HIGH RISK PATIENT  Once         10/15/24 0738     Place sequential compression device  Until discontinued         10/15/24 0738                               Pharmacokinetic Initial Assessment: IV Vancomycin    Assessment/Plan:    Initiate intravenous vancomycin with loading dose of 2250 mg once followed by a maintenance dose of vancomycin 2000mg IV every 24 hours  Desired empiric serum trough concentration is 15 to 20 mcg/mL  Draw vancomycin trough level 60 min prior to third dose on 10-17 at approximately 13:30  Pharmacy will continue to follow and monitor vancomycin.      Please contact pharmacy at extension [phone] with any questions regarding this assessment.     Thank you for the consult,   El Lange       Patient brief summary:  Josselyn Tarango is a 59 y.o. female initiated on antimicrobial therapy with IV Vancomycin for treatment of suspected sepsis    Drug Allergies:   Review of patient's allergies indicates:   Allergen Reactions    Sulfa (sulfonamide antibiotics)        Actual Body Weight:   90.7 kg    Renal Function:   Estimated Creatinine Clearance: 59.3 mL/min (based on SCr of 1.2 mg/dL).,     Dialysis Method (if applicable):  N/A    CBC (last 72 hours):  Recent Labs   Lab Result Units 10/13/24  2240 10/15/24  0351   WBC K/uL 7.26 16.62*   Hemoglobin g/dL 14.3 14.1   Hematocrit % 42.9 41.7   Platelets K/uL 265 246   Gran % % 56.4 91.8*   Lymph % % 34.7 3.4*   Mono % % 5.4 4.2   Eosinophil % % 2.8 0.0   Basophil % % 0.4 0.1   Differential Method  Automated Automated       Metabolic Panel (last 72 hours):  Recent Labs   Lab Result Units 10/13/24  2239 10/13/24  2240 10/15/24  0351 10/15/24  0906   Sodium mmol/L  --  142 142  --    Potassium mmol/L  --  3.4* 3.2*  --    Chloride mmol/L  --  107 108  --    CO2 mmol/L  --  24 18*  --    Glucose mg/dL  --  80 108  --    Glucose, UA  Negative  --   --  Trace*  "  BUN mg/dL  --  6 6  --    Creatinine mg/dL  --  1.0 1.2  --    Albumin g/dL  --  3.8 3.5  --    Total Bilirubin mg/dL  --  0.3 0.2  --    Alkaline Phosphatase U/L  --  100 95  --    AST U/L  --  13 11  --    ALT U/L  --  13 10  --        Drug levels (last 3 results):  No results for input(s): "VANCOMYCINRA", "VANCORANDOM", "VANCOMYCINPE", "VANCOPEAK", "VANCOMYCINTR", "VANCOTROUGH" in the last 72 hours.    Microbiologic Results:  Microbiology Results (last 7 days)       Procedure Component Value Units Date/Time    Urine culture [[id number]] Collected: 10/15/24 0906    Order Status: No result Specimen: Urine Updated: 10/15/24 1001    Blood culture x two cultures. Draw prior to antibiotics. [[id number]] Collected: 10/15/24 0402    Order Status: Sent Specimen: Blood from Peripheral, Forearm, Left Updated: 10/15/24 0927    Blood culture x two cultures. Draw prior to antibiotics. [[id number]] Collected: 10/15/24 0352    Order Status: Sent Specimen: Blood from Peripheral, Antecubital, Right Updated: 10/15/24 0927    Influenza A & B by Molecular [[id number]] Collected: 10/15/24 0356    Order Status: Completed Specimen: Nasopharyngeal Swab Updated: 10/15/24 0437     Influenza A, Molecular Negative     Influenza B, Molecular Negative     Flu A & B Source Nasal swab              Madelin Hunt MD  Department of Hospital Medicine  [place] - Emergency Dept          "

## 2024-10-15 NOTE — PT/OT/SLP EVAL
Speech Language Pathology Evaluation  Bedside Swallow    Patient Name:  Josselyn Tarango   MRN:  [id number]  Admitting Diagnosis: Sepsis    Recommendations:               Diet recommendations:  Regular Diet - IDDSI Level 7, Thin liquids - IDDSI Level 0   Aspiration Precautions: upright for meals, standard precautions, whole meds   General Precautions: Standard, fall  Communication strategies:  none    Assessment:     Josselyn Tarango is a 59 y.o. female admitted with sepsis who presents with ability to initiate an oral diet.     History per MD      Patient presents with    Abdominal Pain       [facility] brought in a 58 y/o female with c//o abd pain since yesterday.  S/P Lithotripsy for stones on Monday morning with a urethra stent in place, patient voiding without difficulty.  Also c/o chills today.        HPI  59-year-old female 1 day status post ureteral stent placement for ureterolithiasis, presents brought in by EMS for feelings of generalized unwell, generalized weakness, and abdominal pain for 1 day, she reports generalized rigors.  EMS reported tachycardia.         Review of patient's allergies indicates:   Allergen Reactions    Sulfa (sulfonamide antibiotics)      Past Medical History:   Diagnosis Date    Diabetes mellitus     diet control    Digestive disorder     Hx: gastric ulcer       Past Surgical History:   Procedure Laterality Date    CYSTOSCOPY W/ URETERAL STENT PLACEMENT Left 6/7/2024    Procedure: CYSTOSCOPY, left retrograde pyelogram, left JJ stent;  Surgeon: Humza Saenz MD;  Location: [facility] OR;  Service: Urology;  Laterality: Left;  MAC vs choice    CYSTOURETEROSCOPY, WITH HOLMIUM LASER LITHOTRIPSY OF URETERAL CALCULUS AND STENT INSERTION Left 6/17/2024    Procedure: Cystoscopy, left retrograde pyelogram, left ureteroscopy with holmium laser lithotripsy, stone basket extraction, left JJ stent;  Surgeon: Humza Saenz MD;  Location: [facility] OR;  Service: Urology;  Laterality: Left;   "Element    CYSTOURETEROSCOPY, WITH HOLMIUM LASER LITHOTRIPSY OF URETERAL CALCULUS AND STENT INSERTION Right 10/14/2024    Procedure: CYSTOURETEROSCOPY, WITH HOLMIUM LASER LITHOTRIPSY OF URETERAL CALCULUS AND STENT INSERTION;  Surgeon: Humza Saenz MD;  Location: [facility] OR;  Service: Urology;  Laterality: Right;  LMA    EXTRACTION - STONE Left 6/17/2024    Procedure: EXTRACTION - STONE;  Surgeon: Humza Saenz MD;  Location: [facility] OR;  Service: Urology;  Laterality: Left;    RETROGRADE PYELOGRAPHY N/A 6/7/2024    Procedure: PYELOGRAM, RETROGRADE;  Surgeon: Humza Saenz MD;  Location: [facility] OR;  Service: Urology;  Laterality: N/A;    RETROGRADE PYELOGRAPHY N/A 6/17/2024    Procedure: PYELOGRAM, RETROGRADE;  Surgeon: Humza Saenz MD;  Location: [facility] OR;  Service: Urology;  Laterality: N/A;    RETROGRADE PYELOGRAPHY  10/14/2024    Procedure: PYELOGRAM, RETROGRADE;  Surgeon: Humza Saenz MD;  Location: [facility] OR;  Service: Urology;;     Chest X-Rays: Cardiac monitoring leads overlie the chest.  The cardiomediastinal silhouette is within normal limits of size and configuration.  There is atherosclerosis of the thoracic aorta.  The lungs appear symmetrically expanded without definite evidence of confluent airspace consolidation, significant volume of pleural fluid or pneumothorax.  Visualized osseous structures are intact.     Prior diet: reg/thin    Subjective     Consult received for clinical swallow  eval this date, SLP did communicate with RN prior to eval/treat.    Patient goals: "to drink some water."     Pain/Comfort:  Pain Rating 1: 10/10  Location 1:  (kidney area)  Pain Addressed 1: Nurse notified    Respiratory Status: NC    Objective:   Pt seen this date in  ED. Pt is awake and reporting pain.   Pt is oriented to self, place and able to answer questions. Pt reporting significant pain. RN was made aware.     Oral Musculature Evaluation  Oral Musculature: WFL  Dentition: present " and adequate  Secretion Management: adequate  Mucosal Quality: adequate, good  Mandibular Strength and Mobility: WFL  Oral Labial Strength and Mobility: WFL  Lingual Strength and Mobility: WFL  Buccal Strength and Mobility: WFL  Volitional Cough: elicited  Volitional Swallow: timely swallow  Voice Prior to PO Intake: clear voice    Bedside Swallow Eval:   Consistencies Assessed:  Thin liquids water by straw   Puree pudding by tsp self fed  Solids ruth cracker       Oral Phase:   WFL    Pharyngeal Phase:   no overt clinical signs/symptoms of aspiration  no overt clinical signs/symptoms of pharyngeal dysphagia    Compensatory Strategies  Standard precautions     Treatment: SLP provided patient education on SLP recommendations, SLP role, s/s and risks of aspiration, safe swallow precautions, and POC. The patient v/u of all discussed and is in agreement with POC.       Goals:   Multidisciplinary Problems       SLP Goals       Not on file                    Plan:     Patient to be seen:      Plan of Care expires:     Plan of Care reviewed with:  patient   SLP Follow-Up:  No       Discharge recommendations:  No Therapy Indicated   Barriers to Discharge:  None    Time Tracking:     SLP Treatment Date:   10/15/24  Speech Start Time:  1152  Speech Stop Time:  1213     Speech Total Time (min):  21 min    Billable Minutes: Eval Swallow and Oral Function 12 and Self Care/Home Management Training 9    10/15/2024

## 2024-10-15 NOTE — SUBJECTIVE & OBJECTIVE
Past Medical History:   Diagnosis Date    Diabetes mellitus     diet control    Digestive disorder     Hx: gastric ulcer       Past Surgical History:   Procedure Laterality Date    CYSTOSCOPY W/ URETERAL STENT PLACEMENT Left 6/7/2024    Procedure: CYSTOSCOPY, left retrograde pyelogram, left JJ stent;  Surgeon: Humza Saenz MD;  Location: [facility];  Service: Urology;  Laterality: Left;  MAC vs choice    CYSTOURETEROSCOPY, WITH HOLMIUM LASER LITHOTRIPSY OF URETERAL CALCULUS AND STENT INSERTION Left 6/17/2024    Procedure: Cystoscopy, left retrograde pyelogram, left ureteroscopy with holmium laser lithotripsy, stone basket extraction, left JJ stent;  Surgeon: Humza Saenz MD;  Location: [facility] OR;  Service: Urology;  Laterality: Left;  Element    CYSTOURETEROSCOPY, WITH HOLMIUM LASER LITHOTRIPSY OF URETERAL CALCULUS AND STENT INSERTION Right 10/14/2024    Procedure: CYSTOURETEROSCOPY, WITH HOLMIUM LASER LITHOTRIPSY OF URETERAL CALCULUS AND STENT INSERTION;  Surgeon: Humza Saenz MD;  Location: [facility];  Service: Urology;  Laterality: Right;  LMA    EXTRACTION - STONE Left 6/17/2024    Procedure: EXTRACTION - STONE;  Surgeon: Humza Saenz MD;  Location: [facility] OR;  Service: Urology;  Laterality: Left;    RETROGRADE PYELOGRAPHY N/A 6/7/2024    Procedure: PYELOGRAM, RETROGRADE;  Surgeon: Humza Saenz MD;  Location: [facility] OR;  Service: Urology;  Laterality: N/A;    RETROGRADE PYELOGRAPHY N/A 6/17/2024    Procedure: PYELOGRAM, RETROGRADE;  Surgeon: Humza Saenz MD;  Location: [facility] OR;  Service: Urology;  Laterality: N/A;    RETROGRADE PYELOGRAPHY  10/14/2024    Procedure: PYELOGRAM, RETROGRADE;  Surgeon: Humza Saenz MD;  Location: [facility] OR;  Service: Urology;;       Review of patient's allergies indicates:   Allergen Reactions    Sulfa (sulfonamide antibiotics)        Family History    None         Tobacco Use    Smoking status: Every Day     Types: Cigarettes    Smokeless  tobacco: Never   Substance and Sexual Activity    Alcohol use: Not on file    Drug use: Not on file    Sexual activity: Not on file       Review of Systems   Constitutional:  Positive for activity change, appetite change, chills and fever.   HENT:  Negative for sore throat and trouble swallowing.    Eyes:  Negative for pain and discharge.   Respiratory:  Negative for shortness of breath and wheezing.    Cardiovascular:  Negative for chest pain and palpitations.   Gastrointestinal:  Negative for abdominal pain, diarrhea, nausea and vomiting.   Genitourinary:  Negative for flank pain.        As per HPI   Musculoskeletal:  Negative for back pain and joint swelling.   Skin:  Negative for rash and wound.   Neurological:  Negative for seizures, weakness and headaches.   Psychiatric/Behavioral:  Negative for hallucinations. The patient is not nervous/anxious.        Objective:     Temp:  [99.4 °F (37.4 °C)-101.7 °F (38.7 °C)] 100 °F (37.8 °C)  Pulse:  [] 106  Resp:  [18-28] 24  SpO2:  [95 %-100 %] 99 %  BP: (105-168)/(57-92) 111/59  Weight: 90.7 kg (200 lb)  Body mass index is 29.97 kg/m².           Drains       None                    Physical Exam  Vitals and nursing note reviewed.   HENT:      Head: Atraumatic.      Nose: Nose normal.   Eyes:      Extraocular Movements: Extraocular movements intact.   Cardiovascular:      Rate and Rhythm: Normal rate.   Pulmonary:      Effort: Pulmonary effort is normal.   Abdominal:      General: Abdomen is flat. There is no distension.      Tenderness: There is no abdominal tenderness. There is no right CVA tenderness or left CVA tenderness.   Musculoskeletal:         General: Normal range of motion.      Cervical back: Normal range of motion.   Skin:     Coloration: Skin is not jaundiced.   Neurological:      General: No focal deficit present.      Mental Status: She is alert and oriented to person, place, and time.   Psychiatric:         Mood and Affect: Mood normal.          Behavior: Behavior normal.          Significant Labs:    BMP:  Recent Labs   Lab 10/10/24  1718 10/13/24  2240 10/15/24  0351    142 142   K 3.3* 3.4* 3.2*    107 108   CO2 23 24 18*   BUN 7 6 6   CREATININE 1.0 1.0 1.2   CALCIUM 9.5 9.6 10.0       CBC:  Recent Labs   Lab 10/10/24  1718 10/13/24  2240 10/15/24  0351   WBC 7.27 7.26 16.62*   HGB 14.0 14.3 14.1   HCT 41.8 42.9 41.7    265 246       All pertinent labs results from the past 24 hours have been reviewed.    Significant Imaging:  All pertinent imaging results/findings from the past 24 hours have been reviewed.

## 2024-10-15 NOTE — ASSESSMENT & PLAN NOTE
This patient does have evidence of infective focus  My overall impression is sepsis.  Source: Urinary Tract  Antibiotics given-   Antibiotics (72h ago, onward)      Start     Stop Route Frequency Ordered    10/15/24 0800  cefTRIAXone (ROCEPHIN) 2 g in D5W 100 mL IVPB (MB+)  ( Urinary Tract Infection (UTI))         10/20/24 0759 IV Every 24 hours (non-standard times) 10/15/24 0738          Latest lactate reviewed-  Recent Labs   Lab 10/15/24  0350   POCLAC 2.3*     Organ dysfunction indicated by  none    Fluid challenge Ideal Body Weight- The patient's ideal body weight is Ideal body weight: 65 kg (143 lb 6.5 oz) which will be used to calculate fluid bolus of 30 ml/kg for treatment of septic shock.      Post- resuscitation assessment Yes Perfusion exam was performed within 6 hours of septic shock presentation after bolus shows Adequate tissue perfusion assessed by invasive monitoring       Will Not start Pressors- Levophed for MAP of 65  Source control achieved by: IV abx        CT abdomen renal protocol  1. On the right, the patient is status post placement of a ureteral stent. Previously seen distal right renal calculus on the 10/10/2024 CT is not definitely appreciated.  Curvilinear calcification in involving right midpole calyx appears similar to prior.  Smaller adjoining punctate calcification/stone on the prior study is not definitely seen on the current study.  Scratch nonspecific right-sided perinephric inflammatory change. Mild urothelial thickening in the visualized upper ureter on the right.  Findings could relate to sequela of recent instrumentation, noting that infectious process would not be excluded in the appropriate clinical context.  2. Indeterminate hypodense lesion right hepatic lobe similar to 10/10/2024 and 07/13/2024 CT.  Further characterization dedicated hepatic mass protocol MRI or CT would be recommended.  3. Details of additional chronic and incidental findings, as provided in the body of  repor

## 2024-10-15 NOTE — ED PROVIDER NOTES
ED Provider Note - 10/13/2024    History     Chief Complaint   Patient presents with    Flank Pain     Pt arrives via EMS c/o right flank pain onset over 1 week pta. States was seen here dx w/kidney stone. States pain continued to get worse and called  who told her to come to ED. Endorses near syncope today. EMS admin 15mg toradol en route. /105 en route, all other VSS.       HPI     Josselyn Tarango is a 59 y.o. year old female with past medical and surgical history as seen below, presenting with chief complaint of flank pain.  Right-sided.  Ongoing over the past week but worsening this evening.  Scheduled for procedure in the morning with Dr. Saenz, however due to her worsening pain was unable to tolerate at home so came to the emergency department for further evaluation.        Past Medical History:   Diagnosis Date    Diabetes mellitus     diet control    Digestive disorder     Hx: gastric ulcer     Past Surgical History:   Procedure Laterality Date    CYSTOSCOPY W/ URETERAL STENT PLACEMENT Left 6/7/2024    Procedure: CYSTOSCOPY, left retrograde pyelogram, left JJ stent;  Surgeon: Humza Saenz MD;  Location: [facility] OR;  Service: Urology;  Laterality: Left;  MAC vs choice    CYSTOURETEROSCOPY, WITH HOLMIUM LASER LITHOTRIPSY OF URETERAL CALCULUS AND STENT INSERTION Left 6/17/2024    Procedure: Cystoscopy, left retrograde pyelogram, left ureteroscopy with holmium laser lithotripsy, stone basket extraction, left JJ stent;  Surgeon: Humza Saenz MD;  Location: [facility] OR;  Service: Urology;  Laterality: Left;  Element    EXTRACTION - STONE Left 6/17/2024    Procedure: EXTRACTION - STONE;  Surgeon: Humza Saenz MD;  Location: [facility] OR;  Service: Urology;  Laterality: Left;    RETROGRADE PYELOGRAPHY N/A 6/7/2024    Procedure: PYELOGRAM, RETROGRADE;  Surgeon: Humza Saenz MD;  Location: [facility] OR;  Service: Urology;  Laterality: N/A;    RETROGRADE PYELOGRAPHY N/A 6/17/2024    Procedure:  PYELOGRAM, RETROGRADE;  Surgeon: Humza Saenz MD;  Location: [facility];  Service: Urology;  Laterality: N/A;         No family history on file.  Social History     Tobacco Use    Smoking status: Every Day     Types: Cigarettes    Smokeless tobacco: Never     Social Drivers of Health with Concerns     Tobacco Use: High Risk (10/14/2024)    Patient History     Smoking Tobacco Use: Every Day     Smokeless Tobacco Use: Never     Passive Exposure: Not on file   Alcohol Use: Not on file   Financial Resource Strain: Not on file   Food Insecurity: Not on file   Transportation Needs: Not on file   Physical Activity: Not on file   Stress: Not on file   Housing Stability: Not on file   Depression: Not on file   Utilities: Not on file   Health Literacy: Not on file   Social Isolation: Not on file      Review of patient's allergies indicates:   Allergen Reactions    Sulfa (sulfonamide antibiotics)        Review of Systems     A full Review of Systems (ROS) was performed and was negative unless otherwise stated in the HPI.      Physical Exam     Vitals:    10/13/24 2056 10/13/24 2309 10/14/24 0012 10/14/24 0338   BP: (!) 153/72      Pulse: 79      Resp:  19 16 16   Temp:       TempSrc:       SpO2: 99%      Weight:       Height:            Physical Exam    Nursing note and vitals reviewed.  Constitutional: She appears well-developed and well-nourished. No distress.   HENT:   Head: Normocephalic and atraumatic.   Right Ear: External ear normal.   Left Ear: External ear normal.   Nose: Nose normal. Mouth/Throat: Oropharynx is clear and moist.   Eyes: Conjunctivae and EOM are normal. Pupils are equal, round, and reactive to light.   Neck: Neck supple.   Normal range of motion.  Cardiovascular:  Normal rate, regular rhythm, normal heart sounds and intact distal pulses.           Pulmonary/Chest: Breath sounds normal. No stridor. No respiratory distress. She has no wheezes. She has no rhonchi. She has no rales.   Abdominal:  Abdomen is soft. Bowel sounds are normal. There is no abdominal tenderness.   Musculoskeletal:         General: No tenderness or edema. Normal range of motion.      Cervical back: Normal range of motion and neck supple.     Neurological: She is alert and oriented to person, place, and time. She has normal strength. No cranial nerve deficit or sensory deficit.   Skin: Skin is warm and dry. No rash noted.   Psychiatric: She has a normal mood and affect. Thought content normal.         Lab Results- Independently reviewed by myself      Labs Reviewed   CBC W/ AUTO DIFFERENTIAL - Abnormal       Result Value    WBC 7.26      RBC 4.89      Hemoglobin 14.3      Hematocrit 42.9      MCV 88      MCH 29.2      MCHC 33.3      RDW 16.5 (*)     Platelets 265      MPV 10.5      Immature Granulocytes 0.3      Gran # (ANC) 4.1      Immature Grans (Abs) 0.02      Lymph # 2.5      Mono # 0.4      Eos # 0.2      Baso # 0.03      nRBC 0      Gran % 56.4      Lymph % 34.7      Mono % 5.4      Eosinophil % 2.8      Basophil % 0.4      Platelet Estimate Clumped (*)     Differential Method Automated     COMPREHENSIVE METABOLIC PANEL - Abnormal    Sodium 142      Potassium 3.4 (*)     Chloride 107      CO2 24      Glucose 80      BUN 6      Creatinine 1.0      Calcium 9.6      Total Protein 7.7      Albumin 3.8      Total Bilirubin 0.3      Alkaline Phosphatase 100      AST 13      ALT 13      eGFR >60      Anion Gap 11     URINALYSIS, REFLEX TO URINE CULTURE - Abnormal    Specimen UA Urine, Clean Catch      Color, UA Colorless (*)     Appearance, UA Clear      pH, UA 6.0      Specific Gravity, UA 1.005      Protein, UA Negative      Glucose, UA Negative      Ketones, UA Negative      Bilirubin (UA) Negative      Occult Blood UA 1+ (*)     Nitrite, UA Negative      Urobilinogen, UA Negative      Leukocytes, UA 2+ (*)     Narrative:     Specimen Source->Urine   URINALYSIS MICROSCOPIC - Abnormal    RBC, UA 1      WBC, UA 18 (*)     Bacteria  Rare      Squam Epithel, UA 1      Microscopic Comment SEE COMMENT      Narrative:     Specimen Source->Urine   CULTURE, URINE           Imaging     Imaging Results    None                    ED Course         Procedures         Orders Placed This Encounter    Urine culture    CBC auto differential    Comprehensive metabolic panel    Urinalysis, Reflex to Urine Culture Urine, Clean Catch    Urinalysis Microscopic    fentaNYL 50 mcg/mL injection 50 mcg                      Medical Decision Making       The patient's list of active medical problems, social history, medications, and allergies as documented per RN staff has been reviewed.           Medical Decision Making  Patient presents with flank pain likely secondary to renal colic from likely urolith. Given history, exam, and work up I have low suspicion for atypical appendicitis, genital torsion, acute cholecystitis, AAA, infected stone, pyelonephritis, or other emergent intraabdominal pathology. Symptoms and UA indicate no infection. BMP without evidence of JUAN LUIS. Pain treated in ED with fentanyl and treated prior to arrival with Toradol with improvement in symptoms.  Patient was monitored in the emergency department given the proximity to her procedure in the a.m..  PRN medications were ordered and given per nursing.  She was discharged at the time of her scheduled pre arrival to the hospital, and aided by nursing over to surgical pre arrival area.    Amount and/or Complexity of Data Reviewed  Independent Historian: EMS  External Data Reviewed: labs, radiology and notes.  Labs: ordered.     Details: CMP: Normal electrolytes, renal function, liver enzymes      Risk  Prescription drug management.                        Clinical Impression       Follow-up Information       Follow up With Specialties Details Why Contact Info    Primary care provider of your choice  Schedule an appointment as soon as possible for a visit in 3 days For follow-up on today's visit.      [place] - Emergency Dept Emergency Medicine Go to  As needed, If symptoms worsen [address]  [place] [address] [phone]              Disposition   ED Disposition Condition    Discharge Stable              Final diagnoses:  [N20.1] Ureterolithiasis (Primary)        Yossi Pollock MD        10/15/2024          DISCLAIMER: This note was prepared with [employer] voice recognition transcription software. Garbled syntax, mangled pronouns, and other bizarre constructions may be attributed to that software system.       Yossi Pollock MD  10/15/24 0336

## 2024-10-15 NOTE — ED NOTES
Pt bladder scanned and straight cath per order. No urine received. Pure wick placed on pt and connected to suction. Awaiting sample.

## 2024-10-15 NOTE — HPI
Josselyn Tarango is a 59-year-old female who underwent right ureteroscopy with stent placement on 10/14/2024.  She now returns with concerns for sepsis following her procedure.  Inside the patient is currently afebrile though had a fever as high as 101.7 while in the ED as well as mild hypotension and tachycardia.  White count 16, creatinine 1.2, urine with occasional bacteria though contaminated with squames, nitrite negative.  Urine culture and blood cultures pending.  CT scan with a right ureteral stent in place without any other concerning findings aside from stranding and mild inflammation of the right ureter.  Right ureteral stent noted to have migrated distally slightly, though no concerns given no hydronephrosis.

## 2024-10-15 NOTE — ASSESSMENT & PLAN NOTE
Patient's most recent potassium results are listed below.   Recent Labs     10/13/24  2240 10/15/24  0351   K 3.4* 3.2*     Plan  - Replete potassium per protocol  - Monitor potassium Daily  - Patient's hypokalemia is improving  -

## 2024-10-15 NOTE — SUBJECTIVE & OBJECTIVE
Past Medical History:   Diagnosis Date    Diabetes mellitus     diet control    Digestive disorder     Hx: gastric ulcer       Past Surgical History:   Procedure Laterality Date    CYSTOSCOPY W/ URETERAL STENT PLACEMENT Left 6/7/2024    Procedure: CYSTOSCOPY, left retrograde pyelogram, left JJ stent;  Surgeon: Humza Saenz MD;  Location: [facility];  Service: Urology;  Laterality: Left;  MAC vs choice    CYSTOURETEROSCOPY, WITH HOLMIUM LASER LITHOTRIPSY OF URETERAL CALCULUS AND STENT INSERTION Left 6/17/2024    Procedure: Cystoscopy, left retrograde pyelogram, left ureteroscopy with holmium laser lithotripsy, stone basket extraction, left JJ stent;  Surgeon: Humza Saenz MD;  Location: [facility] OR;  Service: Urology;  Laterality: Left;  Element    CYSTOURETEROSCOPY, WITH HOLMIUM LASER LITHOTRIPSY OF URETERAL CALCULUS AND STENT INSERTION Right 10/14/2024    Procedure: CYSTOURETEROSCOPY, WITH HOLMIUM LASER LITHOTRIPSY OF URETERAL CALCULUS AND STENT INSERTION;  Surgeon: Humza Saenz MD;  Location: [facility];  Service: Urology;  Laterality: Right;  LMA    EXTRACTION - STONE Left 6/17/2024    Procedure: EXTRACTION - STONE;  Surgeon: Humza Saenz MD;  Location: [facility] OR;  Service: Urology;  Laterality: Left;    RETROGRADE PYELOGRAPHY N/A 6/7/2024    Procedure: PYELOGRAM, RETROGRADE;  Surgeon: Humza Saenz MD;  Location: [facility] OR;  Service: Urology;  Laterality: N/A;    RETROGRADE PYELOGRAPHY N/A 6/17/2024    Procedure: PYELOGRAM, RETROGRADE;  Surgeon: Humza Saenz MD;  Location: [facility] OR;  Service: Urology;  Laterality: N/A;    RETROGRADE PYELOGRAPHY  10/14/2024    Procedure: PYELOGRAM, RETROGRADE;  Surgeon: Humza Saenz MD;  Location: [facility];  Service: Urology;;       Review of patient's allergies indicates:   Allergen Reactions    Sulfa (sulfonamide antibiotics)        Current Facility-Administered Medications on File Prior to Encounter   Medication     [DISCONTINUED] ceFAZolin 2 g in D5W 50 mL IVPB (MB+)    [DISCONTINUED] HYDROmorphone (PF) injection 0.2 mg    [DISCONTINUED] LIDOcaine (PF) 10 mg/ml (1%) injection 10 mg    [DISCONTINUED] oxyCODONE immediate release tablet 5 mg    [DISCONTINUED] prochlorperazine injection Soln 5 mg    [DISCONTINUED] sodium chloride 0.9% flush 10 mL     Current Outpatient Medications on File Prior to Encounter   Medication Sig    ampicillin (PRINCIPEN) 500 MG capsule Take 1 capsule (500 mg total) by mouth every 6 (six) hours. for 7 days    ampicillin (PRINCIPEN) 500 MG capsule Take 1 capsule (500 mg total) by mouth every 6 (six) hours. for 3 days    ampicillin (PRINCIPEN) 500 MG capsule Take 1 capsule (500 mg total) by mouth every 6 (six) hours. for 3 days    EPINEPHrine (EPIPEN) 0.3 mg/0.3 mL AtIn Inject 0.3 mLs (0.3 mg total) into the muscle as needed (anaphylaxis).    ketorolac (TORADOL) 10 mg tablet Take 1 tablet (10 mg total) by mouth every 6 (six) hours as needed for Pain.    ondansetron (ZOFRAN) 4 MG tablet Take 1 tablet (4 mg total) by mouth every 6 (six) hours.    ondansetron (ZOFRAN-ODT) 4 MG TbDL Take 1 tablet (4 mg total) by mouth every 6 (six) hours as needed (Nausea).    oxybutynin (DITROPAN) 5 MG Tab Take 1 tablet (5 mg total) by mouth 3 (three) times daily. for 7 days    oxyCODONE-acetaminophen (PERCOCET) 5-325 mg per tablet Take 1 tablet by mouth every 4 (four) hours as needed for Pain.    oxyCODONE-acetaminophen (PERCOCET) 7.5-325 mg per tablet Take 1 tablet by mouth every 6 (six) hours as needed for Pain.    phenazopyridine (PYRIDIUM) 100 MG tablet Take 1 tablet (100 mg total) by mouth 3 (three) times daily as needed for Pain.    tamsulosin (FLOMAX) 0.4 mg Cap Take 1 capsule (0.4 mg total) by mouth once daily. for 10 days    tamsulosin (FLOMAX) 0.4 mg Cap Take 1 capsule (0.4 mg total) by mouth every evening. for 30 doses     Family History    None       Tobacco Use    Smoking status: Every Day      Types: Cigarettes    Smokeless tobacco: Never   Substance and Sexual Activity    Alcohol use: Not on file    Drug use: Not on file    Sexual activity: Not on file     Review of Systems   Constitutional:  Positive for activity change, appetite change, chills, fatigue and fever.   Eyes:  Negative for photophobia and visual disturbance.   Respiratory:  Negative for shortness of breath.    Gastrointestinal:  Positive for nausea and vomiting.   Genitourinary:  Positive for flank pain and pelvic pain. Negative for difficulty urinating, dysuria, urgency and vaginal pain.   Musculoskeletal:  Negative for arthralgias.   Skin:  Negative for pallor and rash.   Neurological:  Positive for weakness.   Psychiatric/Behavioral:  Negative for confusion.      Objective:     Vital Signs (Most Recent):  Temp: (!) 100.8 °F (38.2 °C) (10/15/24 0617)  Pulse: 101 (10/15/24 0403)  Resp: 18 (10/15/24 0636)  BP: (!) 152/71 (10/15/24 0403)  SpO2: 97 % (10/15/24 0403) Vital Signs (24h Range):  Temp:  [99.4 °F (37.4 °C)-101.7 °F (38.7 °C)] 100.8 °F (38.2 °C)  Pulse:  [101-128] 101  Resp:  [18-20] 18  SpO2:  [97 %-98 %] 97 %  BP: (152-168)/(71-92) 152/71     Weight: 90.7 kg (200 lb)  Body mass index is 29.97 kg/m².     Physical Exam  HENT:      Head: Normocephalic and atraumatic.      Nose: Nose normal.   Pulmonary:      Effort: Pulmonary effort is normal.   Abdominal:      General: Bowel sounds are normal.      Palpations: Abdomen is soft.      Tenderness: There is abdominal tenderness in the right lower quadrant. There is left CVA tenderness.   Musculoskeletal:      Cervical back: Normal range of motion.      Right lower leg: No edema.      Left lower leg: No edema.   Skin:     Findings: Lesion present.   Neurological:      Mental Status: She is alert.   Psychiatric:         Mood and Affect: Mood normal.              Significant Labs: A1C:   Recent Labs   Lab 06/08/24  0626   HGBA1C 5.3     ABGs:   Recent Labs   Lab 10/15/24  0349   PH  "7.444   PCO2 34.6*   HCO3 23.7*   POCSATURATED 82.5*   PO2 42.1     Blood Culture: No results for input(s): "LABBLOO" in the last 48 hours.  CBC:   Recent Labs   Lab 10/13/24  2240 10/15/24  0351   WBC 7.26 16.62*   HGB 14.3 14.1   HCT 42.9 41.7    246     CMP:   Recent Labs   Lab 10/13/24  2240 10/15/24  0351    142   K 3.4* 3.2*    108   CO2 24 18*   GLU 80 108   BUN 6 6   CREATININE 1.0 1.2   CALCIUM 9.6 10.0   PROT 7.7 7.4   ALBUMIN 3.8 3.5   BILITOT 0.3 0.2   ALKPHOS 100 95   AST 13 11   ALT 13 10   ANIONGAP 11 16     Lactic Acid: No results for input(s): "LACTATE" in the last 48 hours.  Lipase: No results for input(s): "LIPASE" in the last 48 hours.  Lipid Panel: No results for input(s): "CHOL", "HDL", "LDLCALC", "TRIG", "CHOLHDL" in the last 48 hours.  Magnesium: No results for input(s): "MG" in the last 48 hours.  Respiratory Culture: No results for input(s): "GSRESP", "RESPIRATORYC" in the last 48 hours.  Troponin: No results for input(s): "TROPONINI", "TROPONINIHS" in the last 48 hours.  TSH: No results for input(s): "TSH" in the last 4320 hours.  Urine Culture: No results for input(s): "LABURIN" in the last 48 hours.  Urine Studies:   Recent Labs   Lab 10/15/24  0906   COLORU Yellow   APPEARANCEUA Hazy*   PHUR 6.0   SPECGRAV >1.030*   PROTEINUA 3+*   GLUCUA Trace*   KETONESU 1+*   BILIRUBINUA Negative   OCCULTUA 3+*   NITRITE Negative   UROBILINOGEN 2.0-3.0*   LEUKOCYTESUR 1+*   RBCUA >100*   WBCUA 20*   BACTERIA Occasional   SQUAMEPITHEL 3   HYALINECASTS 0       Significant Imaging: I have reviewed all pertinent imaging results/findings within the past 24 hours.  "

## 2024-10-15 NOTE — CONSULTS
[facility] - Emergency Dept  Urology  Consult Note    Patient Name: Josselyn Tarango  MRN: [id number]  Admission Date: 10/15/2024  Hospital Length of Stay: 0   Code Status: Full Code   Attending Provider: Madelin Hunt*   Consulting Provider: Bran Gil MD  Primary Care Physician: No, Primary Doctor  Principal Problem:Sepsis    Inpatient consult to Urology  Consult performed by: Bran Gil MD  Consult ordered by: Madelin Hunt MD          Subjective:     HPI:  Josselyn Tarango is a 59-year-old female who underwent right ureteroscopy with stent placement on 10/14/2024.  She now returns with concerns for sepsis following her procedure.  Inside the patient is currently afebrile though had a fever as high as 101.7 while in the ED as well as mild hypotension and tachycardia.  White count 16, creatinine 1.2, urine with occasional bacteria though contaminated with squames, nitrite negative.  Urine culture and blood cultures pending.  CT scan with a right ureteral stent in place without any other concerning findings aside from stranding and mild inflammation of the right ureter.  Right ureteral stent noted to have migrated distally slightly, though no concerns given no hydronephrosis.    Past Medical History:   Diagnosis Date    Diabetes mellitus     diet control    Digestive disorder     Hx: gastric ulcer       Past Surgical History:   Procedure Laterality Date    CYSTOSCOPY W/ URETERAL STENT PLACEMENT Left 6/7/2024    Procedure: CYSTOSCOPY, left retrograde pyelogram, left JJ stent;  Surgeon: Humza Saenz MD;  Location: [facility];  Service: Urology;  Laterality: Left;  MAC vs choice    CYSTOURETEROSCOPY, WITH HOLMIUM LASER LITHOTRIPSY OF URETERAL CALCULUS AND STENT INSERTION Left 6/17/2024    Procedure: Cystoscopy, left retrograde pyelogram, left ureteroscopy with holmium laser lithotripsy, stone basket extraction, left JJ stent;  Surgeon: Humza Saenz MD;  Location: [facility];   Service: Urology;  Laterality: Left;  Element    CYSTOURETEROSCOPY, WITH HOLMIUM LASER LITHOTRIPSY OF URETERAL CALCULUS AND STENT INSERTION Right 10/14/2024    Procedure: CYSTOURETEROSCOPY, WITH HOLMIUM LASER LITHOTRIPSY OF URETERAL CALCULUS AND STENT INSERTION;  Surgeon: Humza Saenz MD;  Location: [facility] OR;  Service: Urology;  Laterality: Right;  LMA    EXTRACTION - STONE Left 6/17/2024    Procedure: EXTRACTION - STONE;  Surgeon: Humza Saenz MD;  Location: [facility] OR;  Service: Urology;  Laterality: Left;    RETROGRADE PYELOGRAPHY N/A 6/7/2024    Procedure: PYELOGRAM, RETROGRADE;  Surgeon: Humza Saenz MD;  Location: [facility] OR;  Service: Urology;  Laterality: N/A;    RETROGRADE PYELOGRAPHY N/A 6/17/2024    Procedure: PYELOGRAM, RETROGRADE;  Surgeon: Humza Saenz MD;  Location: [facility] OR;  Service: Urology;  Laterality: N/A;    RETROGRADE PYELOGRAPHY  10/14/2024    Procedure: PYELOGRAM, RETROGRADE;  Surgeon: Humza Saenz MD;  Location: [facility] OR;  Service: Urology;;       Review of patient's allergies indicates:   Allergen Reactions    Sulfa (sulfonamide antibiotics)        Family History    None         Tobacco Use    Smoking status: Every Day     Types: Cigarettes    Smokeless tobacco: Never   Substance and Sexual Activity    Alcohol use: Not on file    Drug use: Not on file    Sexual activity: Not on file       Review of Systems   Constitutional:  Positive for activity change, appetite change, chills and fever.   HENT:  Negative for sore throat and trouble swallowing.    Eyes:  Negative for pain and discharge.   Respiratory:  Negative for shortness of breath and wheezing.    Cardiovascular:  Negative for chest pain and palpitations.   Gastrointestinal:  Negative for abdominal pain, diarrhea, nausea and vomiting.   Genitourinary:  Negative for flank pain.        As per HPI   Musculoskeletal:  Negative for back pain and joint swelling.   Skin:  Negative for rash and wound.    Neurological:  Negative for seizures, weakness and headaches.   Psychiatric/Behavioral:  Negative for hallucinations. The patient is not nervous/anxious.        Objective:     Temp:  [99.4 °F (37.4 °C)-101.7 °F (38.7 °C)] 100 °F (37.8 °C)  Pulse:  [] 106  Resp:  [18-28] 24  SpO2:  [95 %-100 %] 99 %  BP: (105-168)/(57-92) 111/59  Weight: 90.7 kg (200 lb)  Body mass index is 29.97 kg/m².           Drains       None                    Physical Exam  Vitals and nursing note reviewed.   HENT:      Head: Atraumatic.      Nose: Nose normal.   Eyes:      Extraocular Movements: Extraocular movements intact.   Cardiovascular:      Rate and Rhythm: Normal rate.   Pulmonary:      Effort: Pulmonary effort is normal.   Abdominal:      General: Abdomen is flat. There is no distension.      Tenderness: There is no abdominal tenderness. There is no right CVA tenderness or left CVA tenderness.   Musculoskeletal:         General: Normal range of motion.      Cervical back: Normal range of motion.   Skin:     Coloration: Skin is not jaundiced.   Neurological:      General: No focal deficit present.      Mental Status: She is alert and oriented to person, place, and time.   Psychiatric:         Mood and Affect: Mood normal.         Behavior: Behavior normal.          Significant Labs:    BMP:  Recent Labs   Lab 10/10/24  1718 10/13/24  2240 10/15/24  0351    142 142   K 3.3* 3.4* 3.2*    107 108   CO2 23 24 18*   BUN 7 6 6   CREATININE 1.0 1.0 1.2   CALCIUM 9.5 9.6 10.0       CBC:  Recent Labs   Lab 10/10/24  1718 10/13/24  2240 10/15/24  0351   WBC 7.27 7.26 16.62*   HGB 14.0 14.3 14.1   HCT 41.8 42.9 41.7    265 246       All pertinent labs results from the past 24 hours have been reviewed.    Significant Imaging:  All pertinent imaging results/findings from the past 24 hours have been reviewed.                    Assessment and Plan:     Nephrolithiasis  - no acute interventions from Urology at this time  -  maintain her ureteral stent with strings  - recommend broad-spectrum IV antibiotics, follow up all cultures and tailor antibiotics as needed  - appreciate Hospital Medicine care for her concerns for sepsis following her ureteroscopy yesterday  - all remaining care per primary team  - please reach out to Urology with questions or concerns  - follow up with Dr. Saenz as scheduled        VTE Risk Mitigation (From admission, onward)           Ordered     IP VTE HIGH RISK PATIENT  Once         10/15/24 0738     Place sequential compression device  Until discontinued         10/15/24 0738                  Bran Gil MD  Urology  [facility] - Emergency Dept

## 2024-10-15 NOTE — ED PROVIDER NOTES
Encounter Date: 10/15/2024       History     Chief Complaint   Patient presents with    Abdominal Pain     [facility] brought in a 60 y/o female with c//o abd pain since yesterday.  S/P Lithotripsy for stones on Monday morning with a urethra stent in place, patient voiding without difficulty.  Also c/o chills today.       HPI  59-year-old female 1 day status post ureteral stent placement for ureterolithiasis, presents brought in by EMS for feelings of generalized unwell, generalized weakness, and abdominal pain for 1 day, she reports generalized rigors.  EMS reported tachycardia.  l.  Review of patient's allergies indicates:   Allergen Reactions    Sulfa (sulfonamide antibiotics)      Past Medical History:   Diagnosis Date    Diabetes mellitus     diet control    Digestive disorder     Hx: gastric ulcer     Past Surgical History:   Procedure Laterality Date    CYSTOSCOPY W/ URETERAL STENT PLACEMENT Left 6/7/2024    Procedure: CYSTOSCOPY, left retrograde pyelogram, left JJ stent;  Surgeon: Humza Saenz MD;  Location: [facility] OR;  Service: Urology;  Laterality: Left;  MAC vs choice    CYSTOURETEROSCOPY, WITH HOLMIUM LASER LITHOTRIPSY OF URETERAL CALCULUS AND STENT INSERTION Left 6/17/2024    Procedure: Cystoscopy, left retrograde pyelogram, left ureteroscopy with holmium laser lithotripsy, stone basket extraction, left JJ stent;  Surgeon: Humza Saenz MD;  Location: [facility] OR;  Service: Urology;  Laterality: Left;  Element    EXTRACTION - STONE Left 6/17/2024    Procedure: EXTRACTION - STONE;  Surgeon: Humza Saenz MD;  Location: [facility] OR;  Service: Urology;  Laterality: Left;    RETROGRADE PYELOGRAPHY N/A 6/7/2024    Procedure: PYELOGRAM, RETROGRADE;  Surgeon: Humza Saenz MD;  Location: [facility] OR;  Service: Urology;  Laterality: N/A;    RETROGRADE PYELOGRAPHY N/A 6/17/2024    Procedure: PYELOGRAM, RETROGRADE;  Surgeon: Humza Saenz MD;  Location: [facility] OR;  Service: Urology;   Laterality: N/A;     No family history on file.  Social History     Tobacco Use    Smoking status: Every Day     Types: Cigarettes    Smokeless tobacco: Never   Medical surgical family and social history reviewed  Review of Systems  Complete review of systems was conducted and was negative except as per HPI and as marked  Physical Exam     Initial Vitals [10/15/24 0313]   BP Pulse Resp Temp SpO2   (!) 168/92 (!) 128 20 99.4 °F (37.4 °C) 98 %      MAP       --         Physical Exam  Physical  General: Awake, alert, no acute distress  Head: Normocephalic, atraumatic  Neck: Trachea midline, full range of motion, no meningismus  ENT: Atraumatic, Airway Patent slightly dry mucous membranes  Cardio:  Regular tachycardia, heart sounds normal.  Capillary refill normal  Chest: Atraumatic, No respiratory distress no use of accessory muscles to breath, normal rate, sounds even and clear to auscultation  Abdomen: Soft, scant diffuse tenderness.  no involuntary guarding, rigidity, or rebound.  Upper Ext: Atraumatic, Inspection normal, no swelling  Lower Ext: Atraumatic, Inspection normal, no swelling  Neuro: No gross cranial nerve abnormality, no lateralization, no gross sensory or motor deficits  ED Course   Procedures  Labs Reviewed   CBC W/ AUTO DIFFERENTIAL - Abnormal       Result Value    WBC 16.62 (*)     RBC 4.73      Hemoglobin 14.1      Hematocrit 41.7      MCV 88      MCH 29.8      MCHC 33.8      RDW 16.4 (*)     Platelets 246      MPV 10.5      Immature Granulocytes 0.5      Gran # (ANC) 15.3 (*)     Immature Grans (Abs) 0.09 (*)     Lymph # 0.6 (*)     Mono # 0.7      Eos # 0.0      Baso # 0.02      nRBC 0      Gran % 91.8 (*)     Lymph % 3.4 (*)     Mono % 4.2      Eosinophil % 0.0      Basophil % 0.1      Differential Method Automated     COMPREHENSIVE METABOLIC PANEL - Abnormal    Sodium 142      Potassium 3.2 (*)     Chloride 108      CO2 18 (*)     Glucose 108      BUN 6      Creatinine 1.2      Calcium 10.0       Total Protein 7.4      Albumin 3.5      Total Bilirubin 0.2      Alkaline Phosphatase 95      AST 11      ALT 10      eGFR 52 (*)     Anion Gap 16     INFLUENZA A & B BY MOLECULAR    Influenza A, Molecular Negative      Influenza B, Molecular Negative      Flu A & B Source Nasal swab     CULTURE, BLOOD   CULTURE, BLOOD   PROCALCITONIN    Procalcitonin 0.04     SARS-COV-2 RNA AMPLIFICATION, QUAL    SARS-CoV-2 RNA, Amplification, Qual Negative     URINALYSIS, REFLEX TO URINE CULTURE   LACTIC ACID, PLASMA   POCT GLUCOSE MONITORING CONTINUOUS          Imaging Results              CT Renal Stone Study ABD Pelvis WO (Final result)  Result time 10/15/24 07:00:00      Final result by Rajeev Pichardo MD (10/15/24 07:00:00)                   Impression:      1. On the right, the patient is status post placement of a ureteral stent. Previously seen distal right renal calculus on the 10/10/2024 CT is not definitely appreciated.  Curvilinear calcification in involving right midpole calyx appears similar to prior.  Smaller adjoining punctate calcification/stone on the prior study is not definitely seen on the current study.  Scratch nonspecific right-sided perinephric inflammatory change. Mild urothelial thickening in the visualized upper ureter on the right.  Findings could relate to sequela of recent instrumentation, noting that infectious process would not be excluded in the appropriate clinical context.  2. Indeterminate hypodense lesion right hepatic lobe similar to 10/10/2024 and 07/13/2024 CT.  Further characterization dedicated hepatic mass protocol MRI or CT would be recommended.  3. Details of additional chronic and incidental findings, as provided in the body of report.      Electronically signed by: Rajeev Pichardo  Date:    10/15/2024  Time:    07:00               Narrative:    EXAMINATION:  CT RENAL STONE STUDY ABD PELVIS WO    CLINICAL HISTORY:  sepsis after ureteral stent;    TECHNIQUE:  Low dose axial  images, sagittal and coronal reformations were obtained from the lung bases to the pubic symphysis.  Contrast was not administered.    COMPARISON:  CT of the abdomen and pelvis performed 10/10/2024.    FINDINGS:  Evaluation of the solid organs is limited due to lack of intravenous contrast.    Lower chest: Atelectasis at the lung bases.    URINARY COLLECTING SYSTEM:    On the right, the patient is status post placement of a ureteral stent.  Previously seen distal right renal calculus on the 10/10/2024 CT is not definitely appreciated.  Curvilinear calcification in involving right midpole calyx appears similar to prior.  Smaller adjoining punctate calcification/stone on the prior study is not definitely seen on the current study.  There is persistence of a fluid attenuation presumed simple cyst at the lower pole of the right.  Nonspecific right-sided perinephric inflammatory change.  Mild urothelial thickening in the visualized upper ureter on the right.    On the left, punctate nonobstructing renal calculi are again seen.    Liver: Previously question vague hypodensity in the right hepatic lobe measuring on the order of 17 mm (series 2, image 41) grossly unchanged compared to prior CT imaging of 10/10/2024 and 07/13/2024.    Gallbladder and bile ducts: Question mild dependent hyperattenuation within the gallbladder lumen, which could relate to small stones and/or sludge.  No focal pericholecystic inflammation or new biliary ductal dilatation.    Pancreas: Normal contour.    Spleen: Normal contour.    Adrenals: Grossly unchanged low-density thickening of the left adrenal gland, with attenuation values in keeping with adenoma.  Unremarkable and unchanged appearance of the right adrenal gland.    Lymph nodes: Prominent number, but small retroperitoneal lymph nodes are again noted, may be reactive in etiology.    Bowel and mesentery: No evidence of bowel obstruction.  Colonic diverticulosis.  Appendix not confidently  identified.  Operative sequela in the region the cecum may reflect prior appendectomy.    Abdominal aorta: Nonaneurysmal.  Moderate atherosclerosis.    Inferior vena cava: Unremarkable.    Free fluid or free air: None.    Pelvis: Small volume noted fluid within the urinary bladder may reflect sequela of instrumentation.    Body wall: Fat and vessel containing left lateral abdominal wall ventral hernia similar to prior.    Bones: No acute change.  Degenerative findings in the spine are again appreciated.                                       X-Ray Chest AP Portable (Final result)  Result time 10/15/24 05:47:25      Final result by Misa Taveras MD (10/15/24 05:47:25)                   Impression:      No acute intrathoracic abnormality identified on this single radiographic view of the chest.      Electronically signed by: Misa Taveras MD  Date:    10/15/2024  Time:    05:47               Narrative:    EXAMINATION:  XR CHEST AP PORTABLE    CLINICAL HISTORY:  Sepsis;    TECHNIQUE:  Single frontal view of the chest was performed.    COMPARISON:  None    FINDINGS:  Cardiac monitoring leads overlie the chest.  The cardiomediastinal silhouette is within normal limits of size and configuration.  There is atherosclerosis of the thoracic aorta.  The lungs appear symmetrically expanded without definite evidence of confluent airspace consolidation, significant volume of pleural fluid or pneumothorax.  Visualized osseous structures are intact.                                       Medications   oxybutynin tablet 5 mg (has no administration in time range)   oxyCODONE-acetaminophen 5-325 mg per tablet 1 tablet (has no administration in time range)   tamsulosin 24 hr capsule 0.4 mg (has no administration in time range)   glucose chewable tablet 16 g (has no administration in time range)   glucose chewable tablet 24 g (has no administration in time range)   glucagon (human recombinant) injection 1 mg (has no administration in  time range)   cefTRIAXone (ROCEPHIN) 2 g in D5W 100 mL IVPB (MB+) (has no administration in time range)   polyethylene glycol packet 17 g (has no administration in time range)   acetaminophen tablet 650 mg (has no administration in time range)   dextrose 10% bolus 125 mL 125 mL (has no administration in time range)   dextrose 10% bolus 250 mL 250 mL (has no administration in time range)   sodium chloride 0.9% bolus 1,953 mL 1,953 mL (0 mLs Intravenous Stopped 10/15/24 0552)   piperacillin-tazobactam (ZOSYN) 4.5 g in D5W 100 mL IVPB (MB+) (0 g Intravenous Stopped 10/15/24 0453)   acetaminophen tablet 1,000 mg (1,000 mg Oral Given 10/15/24 0517)   HYDROmorphone injection 0.5 mg (0.5 mg Intravenous Given 10/15/24 0636)     Medical Decision Making     59-year-old female 1 day status post ureteral stent placement for ureterolithiasis, presents brought in by EMS for feelings of generalized unwell, generalized weakness, and abdominal pain for 1 day, she reports generalized rigors.  EMS reported tachycardia.  Code sepsis called on arrival, and empiric bundle therapy for sepsis was initiated.  DX includes broad range of postoperative infections, as well as incidental findings such as COVID or flu with serous, etcetera.  Will evaluate for acute pathology requiring intervention with appropriate studies, treat symptomatically, and reassess.             Reassessed, I certify that a sepsis reassessment was performed at 7:30 a.m..  Heart rate is now improved down to 95, cap refill is improved.  Pending a urinalysis, she has had a small amount of urine output at this point, she is on a peer wick for collection after unsuccessful straight cath.  Patient now admitted for sepsis of an unknown source although likely urinary.  All elements of care discussed with the admitting team will be continuing care.              Previous urine cultures reviewed.  35 minutes were spent in the critical care of this patient. Patient presented with  sepsis which is an acute life and limb threatening time-sensitive medical condition with high risk of decompensation requiring resuscitation including empiric bundle therapy for sepsis including broad-spectrum antibiotics, large volume fluid resuscitation..  Time spent including time at the present bedside, time obtaining additional ancillary information, ordering and interpreting studies, reassessments, ordering interventions, discussion with patient and family,.  This does not include time spent on separately billed for procedures.  I personally performed the critical care time documented here and it does not include time spent by a APPs or residents.    Clinical Impression:  Final diagnoses:  [R65.10] SIRS (systemic inflammatory response syndrome)  [A41.9] Sepsis, due to unspecified organism, unspecified whether acute organ dysfunction present (Primary)  [Z87.442] History of kidney stones          ED Disposition Condition    Admit                 Arcadio Rojas MD  10/15/24 0741

## 2024-10-15 NOTE — PROGRESS NOTES
Pharmacokinetic Initial Assessment: IV Vancomycin    Assessment/Plan:    Initiate intravenous vancomycin with loading dose of 2250 mg once followed by a maintenance dose of vancomycin 2000mg IV every 24 hours  Desired empiric serum trough concentration is 15 to 20 mcg/mL  Draw vancomycin trough level 60 min prior to third dose on 10-17 at approximately 13:30  Pharmacy will continue to follow and monitor vancomycin.      Please contact pharmacy at extension [phone] with any questions regarding this assessment.     Thank you for the consult,   El Lange       Patient brief summary:  Josselyn Tarango is a 59 y.o. female initiated on antimicrobial therapy with IV Vancomycin for treatment of suspected sepsis    Drug Allergies:   Review of patient's allergies indicates:   Allergen Reactions    Sulfa (sulfonamide antibiotics)        Actual Body Weight:   90.7 kg    Renal Function:   Estimated Creatinine Clearance: 59.3 mL/min (based on SCr of 1.2 mg/dL).,     Dialysis Method (if applicable):  N/A    CBC (last 72 hours):  Recent Labs   Lab Result Units 10/13/24  2240 10/15/24  0351   WBC K/uL 7.26 16.62*   Hemoglobin g/dL 14.3 14.1   Hematocrit % 42.9 41.7   Platelets K/uL 265 246   Gran % % 56.4 91.8*   Lymph % % 34.7 3.4*   Mono % % 5.4 4.2   Eosinophil % % 2.8 0.0   Basophil % % 0.4 0.1   Differential Method  Automated Automated       Metabolic Panel (last 72 hours):  Recent Labs   Lab Result Units 10/13/24  2239 10/13/24  2240 10/15/24  0351 10/15/24  0906   Sodium mmol/L  --  142 142  --    Potassium mmol/L  --  3.4* 3.2*  --    Chloride mmol/L  --  107 108  --    CO2 mmol/L  --  24 18*  --    Glucose mg/dL  --  80 108  --    Glucose, UA  Negative  --   --  Trace*   BUN mg/dL  --  6 6  --    Creatinine mg/dL  --  1.0 1.2  --    Albumin g/dL  --  3.8 3.5  --    Total Bilirubin mg/dL  --  0.3 0.2  --    Alkaline Phosphatase U/L  --  100 95  --    AST U/L  --  13 11  --    ALT U/L  --  13 10  --        Drug levels  "(last 3 results):  No results for input(s): "VANCOMYCINRA", "VANCORANDOM", "VANCOMYCINPE", "VANCOPEAK", "VANCOMYCINTR", "VANCOTROUGH" in the last 72 hours.    Microbiologic Results:  Microbiology Results (last 7 days)       Procedure Component Value Units Date/Time    Urine culture [[id number]] Collected: 10/15/24 0906    Order Status: No result Specimen: Urine Updated: 10/15/24 1001    Blood culture x two cultures. Draw prior to antibiotics. [[id number]] Collected: 10/15/24 0402    Order Status: Sent Specimen: Blood from Peripheral, Forearm, Left Updated: 10/15/24 0927    Blood culture x two cultures. Draw prior to antibiotics. [[id number]] Collected: 10/15/24 0352    Order Status: Sent Specimen: Blood from Peripheral, Antecubital, Right Updated: 10/15/24 0927    Influenza A & B by Molecular [[id number]] Collected: 10/15/24 0356    Order Status: Completed Specimen: Nasopharyngeal Swab Updated: 10/15/24 0437     Influenza A, Molecular Negative     Influenza B, Molecular Negative     Flu A & B Source Nasal swab            "

## 2024-10-15 NOTE — ASSESSMENT & PLAN NOTE
- no acute interventions from Urology at this time  - maintain her ureteral stent with strings  - recommend broad-spectrum IV antibiotics, follow up all cultures and tailor antibiotics as needed  - appreciate Hospital Medicine care for her concerns for sepsis following her ureteroscopy yesterday  - all remaining care per primary team  - please reach out to Urology with questions or concerns  - follow up with Dr. Saenz as scheduled

## 2024-10-16 ENCOUNTER — CLINICAL SUPPORT (OUTPATIENT)
Dept: SMOKING CESSATION | Facility: CLINIC | Age: 59
End: 2024-10-16

## 2024-10-16 DIAGNOSIS — F17.210 CIGARETTE SMOKER: Primary | ICD-10-CM

## 2024-10-16 PROBLEM — R78.81 BACTEREMIA: Status: ACTIVE | Noted: 2024-10-16

## 2024-10-16 PROBLEM — N17.9 AKI (ACUTE KIDNEY INJURY): Status: ACTIVE | Noted: 2024-10-16

## 2024-10-16 LAB
ACINETOBACTER CALCOACETICUS/BAUMANNII COMPLEX: NOT DETECTED
ALBUMIN SERPL BCP-MCNC: 2.7 G/DL (ref 3.5–5.2)
ALP SERPL-CCNC: 73 U/L (ref 55–135)
ALT SERPL W/O P-5'-P-CCNC: 8 U/L (ref 10–44)
ANION GAP SERPL CALC-SCNC: 13 MMOL/L (ref 8–16)
AST SERPL-CCNC: 13 U/L (ref 10–40)
BACTERIA UR CULT: NO GROWTH
BACTEROIDES FRAGILIS: NOT DETECTED
BASOPHILS # BLD AUTO: ABNORMAL K/UL (ref 0–0.2)
BASOPHILS NFR BLD: 0 % (ref 0–1.9)
BILIRUB SERPL-MCNC: 0.2 MG/DL (ref 0.1–1)
BUN SERPL-MCNC: 12 MG/DL (ref 6–20)
CALCIUM SERPL-MCNC: 8.8 MG/DL (ref 8.7–10.5)
CANDIDA ALBICANS: NOT DETECTED
CANDIDA AURIS: NOT DETECTED
CANDIDA GLABRATA: NOT DETECTED
CANDIDA KRUSEI: NOT DETECTED
CANDIDA PARAPSILOSIS: NOT DETECTED
CANDIDA TROPICALIS: NOT DETECTED
CHLORIDE SERPL-SCNC: 106 MMOL/L (ref 95–110)
CO2 SERPL-SCNC: 19 MMOL/L (ref 23–29)
CREAT SERPL-MCNC: 1.5 MG/DL (ref 0.5–1.4)
CRYPTOCOCCUS NEOFORMANS/GATTII: NOT DETECTED
CTX-M GENE (ESBL PRODUCER): NOT DETECTED
DIFFERENTIAL METHOD BLD: ABNORMAL
ENTEROBACTER CLOACAE COMPLEX: NOT DETECTED
ENTEROBACTERALES: NOT DETECTED
ENTEROCOCCUS FAECALIS: NOT DETECTED
ENTEROCOCCUS FAECIUM: NOT DETECTED
EOSINOPHIL # BLD AUTO: ABNORMAL K/UL (ref 0–0.5)
EOSINOPHIL NFR BLD: 0 % (ref 0–8)
ERYTHROCYTE [DISTWIDTH] IN BLOOD BY AUTOMATED COUNT: 17.2 % (ref 11.5–14.5)
ESCHERICHIA COLI: NOT DETECTED
EST. GFR  (NO RACE VARIABLE): 40 ML/MIN/1.73 M^2
GLUCOSE SERPL-MCNC: 92 MG/DL (ref 70–110)
HAEMOPHILUS INFLUENZAE: NOT DETECTED
HCT VFR BLD AUTO: 36.8 % (ref 37–48.5)
HGB BLD-MCNC: 12 G/DL (ref 12–16)
IMM GRANULOCYTES # BLD AUTO: ABNORMAL K/UL (ref 0–0.04)
IMM GRANULOCYTES NFR BLD AUTO: ABNORMAL % (ref 0–0.5)
IMP GENE (CARBAPENEM RESISTANT): NOT DETECTED
KLEBSIELLA AEROGENES: NOT DETECTED
KLEBSIELLA OXYTOCA: NOT DETECTED
KLEBSIELLA PNEUMONIAE GROUP: NOT DETECTED
KPC RESISTANCE GENE (CARBAPENEM): NOT DETECTED
LISTERIA MONOCYTOGENES: NOT DETECTED
LYMPHOCYTES # BLD AUTO: ABNORMAL K/UL (ref 1–4.8)
LYMPHOCYTES NFR BLD: 9 % (ref 18–48)
MAGNESIUM SERPL-MCNC: 1.4 MG/DL (ref 1.6–2.6)
MCH RBC QN AUTO: 29.3 PG (ref 27–31)
MCHC RBC AUTO-ENTMCNC: 32.6 G/DL (ref 32–36)
MCR-1: ABNORMAL
MCV RBC AUTO: 90 FL (ref 82–98)
MEC A/C AND MREJ (MRSA): ABNORMAL
MEC A/C: ABNORMAL
MONOCYTES # BLD AUTO: ABNORMAL K/UL (ref 0.3–1)
MONOCYTES NFR BLD: 2 % (ref 4–15)
NDM GENE (CARBAPENEM RESISTANT): NOT DETECTED
NEISSERIA MENINGITIDIS: NOT DETECTED
NEUTROPHILS NFR BLD: 86 % (ref 38–73)
NEUTS BAND NFR BLD MANUAL: 3 %
NRBC BLD-RTO: 0 /100 WBC
OXA-48-LIKE (CARBAPENEM RESISTANT): ABNORMAL
PHOSPHATE SERPL-MCNC: 3.1 MG/DL (ref 2.7–4.5)
PLATELET # BLD AUTO: 202 K/UL (ref 150–450)
PLATELET BLD QL SMEAR: ABNORMAL
PMV BLD AUTO: 11.1 FL (ref 9.2–12.9)
POCT GLUCOSE: 121 MG/DL (ref 70–110)
POCT GLUCOSE: 129 MG/DL (ref 70–110)
POCT GLUCOSE: 143 MG/DL (ref 70–110)
POTASSIUM SERPL-SCNC: 4.3 MMOL/L (ref 3.5–5.1)
PROT SERPL-MCNC: 5.9 G/DL (ref 6–8.4)
PROTEUS SPECIES: NOT DETECTED
PSEUDOMONAS AERUGINOSA: DETECTED
RBC # BLD AUTO: 4.1 M/UL (ref 4–5.4)
SALMONELLA SP: NOT DETECTED
SERRATIA MARCESCENS: NOT DETECTED
SODIUM SERPL-SCNC: 138 MMOL/L (ref 136–145)
STAPHYLOCOCCUS AUREUS: NOT DETECTED
STAPHYLOCOCCUS EPIDERMIDIS: NOT DETECTED
STAPHYLOCOCCUS LUGDUNESIS: NOT DETECTED
STAPHYLOCOCCUS SPECIES: NOT DETECTED
STENOTROPHOMONAS MALTOPHILIA: NOT DETECTED
STREPTOCOCCUS AGALACTIAE: NOT DETECTED
STREPTOCOCCUS PNEUMONIAE: NOT DETECTED
STREPTOCOCCUS PYOGENES: NOT DETECTED
STREPTOCOCCUS SPECIES: NOT DETECTED
VAN A/B (VRE GENE): ABNORMAL
VIM GENE (CARBAPENEM RESISTANT): NOT DETECTED
WBC # BLD AUTO: 24.25 K/UL (ref 3.9–12.7)

## 2024-10-16 PROCEDURE — 97530 THERAPEUTIC ACTIVITIES: CPT

## 2024-10-16 PROCEDURE — 63600175 PHARM REV CODE 636 W HCPCS: Performed by: FAMILY MEDICINE

## 2024-10-16 PROCEDURE — 85027 COMPLETE CBC AUTOMATED: CPT | Performed by: FAMILY MEDICINE

## 2024-10-16 PROCEDURE — 97165 OT EVAL LOW COMPLEX 30 MIN: CPT

## 2024-10-16 PROCEDURE — 36415 COLL VENOUS BLD VENIPUNCTURE: CPT | Performed by: FAMILY MEDICINE

## 2024-10-16 PROCEDURE — 80053 COMPREHEN METABOLIC PANEL: CPT | Performed by: FAMILY MEDICINE

## 2024-10-16 PROCEDURE — 99406 BEHAV CHNG SMOKING 3-10 MIN: CPT | Mod: ,,,

## 2024-10-16 PROCEDURE — 51798 US URINE CAPACITY MEASURE: CPT

## 2024-10-16 PROCEDURE — 83735 ASSAY OF MAGNESIUM: CPT | Performed by: FAMILY MEDICINE

## 2024-10-16 PROCEDURE — 25000003 PHARM REV CODE 250

## 2024-10-16 PROCEDURE — 99232 SBSQ HOSP IP/OBS MODERATE 35: CPT | Mod: ,,, | Performed by: UROLOGY

## 2024-10-16 PROCEDURE — 11000001 HC ACUTE MED/SURG PRIVATE ROOM

## 2024-10-16 PROCEDURE — 63600175 PHARM REV CODE 636 W HCPCS

## 2024-10-16 PROCEDURE — 25000003 PHARM REV CODE 250: Performed by: FAMILY MEDICINE

## 2024-10-16 PROCEDURE — 99900035 HC TECH TIME PER 15 MIN (STAT)

## 2024-10-16 PROCEDURE — 84100 ASSAY OF PHOSPHORUS: CPT | Performed by: FAMILY MEDICINE

## 2024-10-16 PROCEDURE — 85007 BL SMEAR W/DIFF WBC COUNT: CPT | Performed by: FAMILY MEDICINE

## 2024-10-16 PROCEDURE — 94761 N-INVAS EAR/PLS OXIMETRY MLT: CPT

## 2024-10-16 PROCEDURE — S4991 NICOTINE PATCH NONLEGEND: HCPCS | Performed by: FAMILY MEDICINE

## 2024-10-16 RX ORDER — CEFTRIAXONE 2 G/1
2 INJECTION, POWDER, FOR SOLUTION INTRAMUSCULAR; INTRAVENOUS
Status: DISCONTINUED | OUTPATIENT
Start: 2024-10-17 | End: 2024-10-16

## 2024-10-16 RX ORDER — CEFEPIME HYDROCHLORIDE 2 G/1
2 INJECTION, POWDER, FOR SOLUTION INTRAVENOUS
Status: DISCONTINUED | OUTPATIENT
Start: 2024-10-16 | End: 2024-10-18 | Stop reason: HOSPADM

## 2024-10-16 RX ORDER — MAGNESIUM SULFATE HEPTAHYDRATE 40 MG/ML
2 INJECTION, SOLUTION INTRAVENOUS ONCE
Status: COMPLETED | OUTPATIENT
Start: 2024-10-16 | End: 2024-10-16

## 2024-10-16 RX ADMIN — OXYBUTYNIN CHLORIDE 5 MG: 5 TABLET ORAL at 09:10

## 2024-10-16 RX ADMIN — TAMSULOSIN HYDROCHLORIDE 0.4 MG: 0.4 CAPSULE ORAL at 11:10

## 2024-10-16 RX ADMIN — Medication 600 ML: at 08:10

## 2024-10-16 RX ADMIN — OXYBUTYNIN CHLORIDE 5 MG: 5 TABLET ORAL at 11:10

## 2024-10-16 RX ADMIN — MAGNESIUM SULFATE IN WATER 2 G: 40 INJECTION, SOLUTION INTRAVENOUS at 12:10

## 2024-10-16 RX ADMIN — CEFTRIAXONE SODIUM 2 G: 2 INJECTION, POWDER, FOR SOLUTION INTRAMUSCULAR; INTRAVENOUS at 11:10

## 2024-10-16 RX ADMIN — Medication 600 ML: at 12:10

## 2024-10-16 RX ADMIN — ACETAMINOPHEN 650 MG: 325 TABLET ORAL at 11:10

## 2024-10-16 RX ADMIN — Medication 600 ML: at 03:10

## 2024-10-16 RX ADMIN — ACETAMINOPHEN 650 MG: 325 TABLET ORAL at 07:10

## 2024-10-16 RX ADMIN — Medication 600 ML: at 05:10

## 2024-10-16 RX ADMIN — POLYETHYLENE GLYCOL 3350 17 G: 17 POWDER, FOR SOLUTION ORAL at 11:10

## 2024-10-16 RX ADMIN — NICOTINE 1 PATCH: 21 PATCH, EXTENDED RELEASE TRANSDERMAL at 11:10

## 2024-10-16 RX ADMIN — ONDANSETRON 4 MG: 2 INJECTION INTRAMUSCULAR; INTRAVENOUS at 11:10

## 2024-10-16 RX ADMIN — OXYBUTYNIN CHLORIDE 5 MG: 5 TABLET ORAL at 03:10

## 2024-10-16 RX ADMIN — CEFEPIME 2 G: 2 INJECTION, POWDER, FOR SOLUTION INTRAVENOUS at 03:10

## 2024-10-16 RX ADMIN — SODIUM CHLORIDE, POTASSIUM CHLORIDE, SODIUM LACTATE AND CALCIUM CHLORIDE 500 ML: 600; 310; 30; 20 INJECTION, SOLUTION INTRAVENOUS at 06:10

## 2024-10-16 RX ADMIN — OXYCODONE HYDROCHLORIDE AND ACETAMINOPHEN 2 TABLET: 5; 325 TABLET ORAL at 03:10

## 2024-10-16 RX ADMIN — Medication 600 ML: at 11:10

## 2024-10-16 RX ADMIN — SODIUM CHLORIDE, POTASSIUM CHLORIDE, SODIUM LACTATE AND CALCIUM CHLORIDE 1000 ML: 600; 310; 30; 20 INJECTION, SOLUTION INTRAVENOUS at 12:10

## 2024-10-16 NOTE — ASSESSMENT & PLAN NOTE
JUAN LUIS is likely due to pre-renal azotemia due to intravascular volume depletion. Baseline creatinine is  1,0 . Most recent creatinine and eGFR are listed below.  Recent Labs     10/13/24  2240 10/15/24  0351 10/16/24  0244   CREATININE 1.0 1.2 1.5*   EGFRNORACEVR >60 52* 40*      Plan  - JUAN LUIS is improving  - Avoid nephrotoxins and renally dose meds for GFR listed above  - Monitor urine output, serial BMP, and adjust therapy as needed  -

## 2024-10-16 NOTE — PROGRESS NOTES
10/15/24 2139   Patient Request   Patient Requested AI alert   Nurse Notification   Charge Nurse Notified? Yes   Name of Charge Nurse LORI Kulkarni RN   Bedside Nurse Notified? Yes   Name of Bedside Nurse WILLY Nguyen   Nurse Notfication Method Secure Chat   Nurse Notified Of Other  (AI alert)   Provider Notification   Provider Notified? Yes   Name of Provider MARY Anderson NP

## 2024-10-16 NOTE — MEDICAL/APP STUDENT
[facility] Medicine  Progress Note    Patient Name: Josselyn Tarango  MRN: [id number]  Patient Class: IP- Inpatient   Admission Date: 10/15/2024  Length of Stay: 1 days  Attending Physician: Madelin Hunt*  Primary Care Provider: Renetta, Primary Doctor        Subjective:     Principal Problem:Sepsis      HPI:  Josselyn Tarango is a 60 y/o F with PMH of prediabetes, diet controlled, nicotine dependence, ureteral lithiasis, s/p ureteral stent placement on 10/14, presents with 1 day history of generalized malaise, with associated chills with rigors, fever, nausea, vomiting, decreased appetite and abdominal pain.  Patient stated symptoms started few hours after her procedure-right ureteral stent placement.  She had received perioperative IV antibiotics, and continued with her p.o. antibiotics at home.  On presentation, WBC 16.2, H/H 14/41, platelets 246, potassium 3.2, BUN/creatinine 10/1.2, LFT within normal limit, chest x-ray unremarkable, CT renal stone study reports no stone obstruction and nonspecific right-sided perinephric inflammatory changes.  Mild urothelial thickening in the visualized ureter of the right.  Patient was started on sepsis protocol, IV fluid and IV antibiotics.  Admit hospital medicine service    Overview/Hospital Course:  Patient started on IV ceftriaxone, piperacillin-tazobactam, and vancomycin in the ED. She was put on a purewick catheter for urine collection following unsuccessful straight catheterization. Hydromorphone and acetaminophen given for abdominal pain. Blood cultures collected with preliminary result showing growth of gram negative rods. Rapid organism ID PCR panel positive for pseudomonas aeruginosa. Antibiotics switched to cefepime on 10/16. Urine culture pending. Patient currently still on purewick catheter.    Interval History/Significant Events: ERIN MEZA. Patient seen by urology yesterday and had right ureteral stent removed, no other intervention at  this time. She was seen by Speech Language Pathology for dysphagia and cleared for regular diet. Regurgitated dinner last night but tolerating PO this morning. Patient endorses increasing leg weakness and is unable to ambulate on her own.    Review of Systems   Constitutional:  Positive for chills, fatigue and fever.   HENT:  Positive for congestion, ear pain (New onset right-sided ear pain, patient unable to describe) and trouble swallowing.    Eyes: Negative.    Respiratory: Negative.     Cardiovascular: Negative.    Gastrointestinal:  Positive for abdominal pain and vomiting. Negative for nausea.   Endocrine: Negative.    Genitourinary:  Positive for decreased urine volume and flank pain. Negative for difficulty urinating, dysuria and urgency.   Skin:  Positive for pallor.   Neurological:  Positive for weakness (Bilateral leg weakness) and headaches.   Psychiatric/Behavioral: Negative.       Objective:     Vital Signs (Most Recent):  Temp: 98.4 °F (36.9 °C) (10/16/24 1131)  Pulse: 92 (10/16/24 1243)  Resp: 18 (10/16/24 1131)  BP: (!) 113/59 (10/16/24 1131)  SpO2: 96 % (10/16/24 1131) Vital Signs (24h Range):  Temp:  [98.4 °F (36.9 °C)-102 °F (38.9 °C)] 98.4 °F (36.9 °C)  Pulse:  [] 92  Resp:  [17-32] 18  SpO2:  [93 %-97 %] 96 %  BP: (101-189)/(51-84) 113/59     Weight: 100 kg (220 lb 7.4 oz)  Body mass index is 33.03 kg/m².    Intake/Output Summary (Last 24 hours) at 10/16/2024 1445  Last data filed at 10/16/2024 1348  Gross per 24 hour   Intake 3499 ml   Output 1080 ml   Net 2419 ml      Physical Exam  Constitutional:       General: She is not in acute distress.     Appearance: She is obese. She is ill-appearing.   HENT:      Head: Normocephalic and atraumatic.      Mouth/Throat:      Mouth: Mucous membranes are moist.      Pharynx: Oropharynx is clear.   Eyes:      Extraocular Movements: Extraocular movements intact.      Pupils: Pupils are equal, round, and reactive to light.   Cardiovascular:      Rate  "and Rhythm: Regular rhythm. Tachycardia present.      Pulses: Normal pulses.      Heart sounds: Normal heart sounds.   Pulmonary:      Effort: Pulmonary effort is normal.      Breath sounds: Normal breath sounds.   Abdominal:      Palpations: Abdomen is soft.      Tenderness: There is abdominal tenderness (right flank > left flank > central abdomen). There is no guarding or rebound.   Musculoskeletal:         General: Normal range of motion.   Skin:     General: Skin is warm and dry.      Capillary Refill: Capillary refill takes less than 2 seconds.      Findings: No lesion or rash.   Neurological:      Mental Status: She is alert and oriented to person, place, and time.      Cranial Nerves: No cranial nerve deficit.      Sensory: No sensory deficit.      Motor: No weakness (Lower extremities 5/5 on testing).   Psychiatric:         Mood and Affect: Mood normal.         Behavior: Behavior normal.       Significant Labs: All pertinent labs within the past 24 hours have been reviewed.  Blood Culture:   Recent Labs   Lab 10/15/24  0352 10/15/24  0402   LABBLOO Gram stain aer bottle: Gram negative rods  Results called to and read back by: Dia WASSERMAN 10/16/2024  09:07 No Growth to date  No Growth to date     BMP:   Recent Labs   Lab 10/16/24  0244   GLU 92      K 4.3      CO2 19*   BUN 12   CREATININE 1.5*   CALCIUM 8.8   MG 1.4*     CBC:   Recent Labs   Lab 10/15/24  0351 10/16/24  0244   WBC 16.62* 24.25*   HGB 14.1 12.0   HCT 41.7 36.8*    202     Lactic Acid:   Recent Labs   Lab 10/15/24  1120   LACTATE 2.9*     Magnesium:   Recent Labs   Lab 10/15/24  0351 10/16/24  0244   MG 1.5* 1.4*     POCT Glucose:   Recent Labs   Lab 10/15/24  0952 10/16/24  0555 10/16/24  1210   POCTGLUCOSE 167* 129* 143*     Urine Culture: No results for input(s): "LABURIN" in the last 48 hours.  Urine Studies:   Recent Labs   Lab 10/15/24  0906   COLORU Yellow   APPEARANCEUA Hazy*   PHUR 6.0   SPECGRAV >1.030* " "  PROTEINUA 3+*   GLUCUA Trace*   KETONESU 1+*   BILIRUBINUA Negative   OCCULTUA 3+*   NITRITE Negative   UROBILINOGEN 2.0-3.0*   LEUKOCYTESUR 1+*   RBCUA >100*   WBCUA 20*   BACTERIA Occasional   SQUAMEPITHEL 3   HYALINECASTS 0       Significant Imaging: I have reviewed all pertinent imaging results/findings within the past 24 hours.  CT Renal Stone Study ABD Pelvis WO:  On the right, the patient is status post placement of a ureteral stent. Previously seen distal right renal calculus on the 10/10/2024 CT is not definitely appreciated. Curvilinear calcification in involving right midpole calyx appears similar to prior. Smaller adjoining punctate calcification/stone on the prior study is not definitely seen on the current study. Scratch nonspecific right-sided perinephric inflammatory change. Mild urothelial thickening in the visualized upper ureter on the right. Findings could relate to sequela of recent instrumentation, noting that infectious process would not be excluded in the appropriate clinical context.     Assessment/Plan:      * Sepsis  Bacteremia  This patient does have evidence of infective focus  My overall impression is sepsis.  Source: Urinary Tract  Antibiotics given-   Antibiotics (72h ago, onward)      Start     Stop Route Frequency Ordered    10/16/24 1430  vancomycin 2 g in dextrose 5 % 500 mL IVPB         10/16/24 1227  IV Every 24 hours (non-standard times) 10/15/24 1346    10/15/24 1427  vancomycin - pharmacy to dose  (vancomycin IVPB (PEDS and ADULTS))        Placed in "And" Linked Group    10/15/24 1857  IV pharmacy to manage frequency 10/15/24 1327    10/15/24 0800  cefTRIAXone (ROCEPHIN) 2 g in D5W 100 mL IVPB (MB+)  ( Urinary Tract Infection (UTI))         10/16/24 1143   IV Every 24 hours (non-standard times) 10/15/24 0738    10/16/24 1300  ceFEPIme injection 2 g   -- IV Every 12 hours (non-standard times) 10/16/24 1228          Latest lactate reviewed-  Recent Labs   Lab 10/15/24  0350 " 10/15/24  1120   LACTATE  --  2.9*   POCLAC 2.3*  --        Organ dysfunction indicated by  none    Fluid challenge Ideal Body Weight- The patient's ideal body weight is Ideal body weight: 65 kg (143 lb 6.5 oz) which will be used to calculate fluid bolus of 30 ml/kg for treatment of septic shock.      Post- resuscitation assessment Yes Perfusion exam was performed within 6 hours of septic shock presentation after bolus shows Adequate tissue perfusion assessed by invasive monitoring       Will Not start Pressors- Levophed for MAP of 65  Source control achieved by: IV abx    Blood culture with Gram-negative rods-repeat blood culture on 10/16.  Rapid organism ID PCR panel positive for pseudomonas aeruginosa. Right ureteral stent removed by urology. Discontinue vanc/ceftriaxone. Begin cefepime.    JUAN LUIS (acute kidney injury)  JUAN LUIS is likely due to pre-renal azotemia due to intravascular volume depletion. Baseline creatinine is 1.0. Most recent creatinine and eGFR are listed below.  Recent Labs     10/13/24  2240 10/15/24  0351 10/16/24  0244   CREATININE 1.0 1.2 1.5*   EGFRNORACEVR >60 52* 40*      Plan  - Avoid nephrotoxins and renally dose meds for GFR listed above  - Monitor urine output, serial BMP, and adjust therapy as needed    Hypokalemia  Patient's most recent potassium results are listed below.   Recent Labs      10/13/24  2240 10/15/24  0351 10/16/24  0244   K 3.4* 3.2* 4.3     Plan  - Patient's potassium level has returned to normal  - Monitor potassium daily, replete if necessary    Pre-diabetes  HbA1c 5.3 four months ago  Continue diet controlled    Dysphagia  SLP consulted - full diet recs given    Dependence on nicotine from cigarettes  Dangers of cigarette smoking were reviewed with patient in detail. Patient was Counseled for 3-10 minutes. Nicotine replacement options were discussed. Nicotine replacement was discussed- prescribed    Nephrolithiasis  History of renal stone s/p ureteral stent placement for  ureterolithiasis        VTE Risk Mitigation (From admission, onward)           Ordered     IP VTE HIGH RISK PATIENT  Once         10/15/24 0738     Place sequential compression device  Until discontinued         10/15/24 0738                    Discharge Planning   PIERO:      Code Status: Full Code   Is the patient medically ready for discharge?: NO    Reason for patient still in hospital (select all that apply): Treatment  Discharge Plan A: Home                  Angelo Duff MS3  Department of [facility] Medicine   [facility]

## 2024-10-16 NOTE — PROGRESS NOTES
[facility]  Urology  Progress Note    Patient Name: Josselyn Tarango  MRN: [id number]  Admission Date: 10/15/2024  Hospital Length of Stay: 1 days  Code Status: Full Code   Attending Provider: Madelin Hunt*   Primary Care Physician: Renetta, Primary Doctor    Subjective:     HPI:  Josselyn Tarango is a 59-year-old female who underwent right ureteroscopy with stent placement on 10/14/2024.  She now returns with concerns for sepsis following her procedure.  Inside the patient is currently afebrile though had a fever as high as 101.7 while in the ED as well as mild hypotension and tachycardia.  White count 16, creatinine 1.2, urine with occasional bacteria though contaminated with squames, nitrite negative.  Urine culture and blood cultures pending.  CT scan with a right ureteral stent in place without any other concerning findings aside from stranding and mild inflammation of the right ureter.  Right ureteral stent noted to have migrated distally slightly, though no concerns given no hydronephrosis.    Interval History: NAEON. AFVSS this AM. Cr with slight elevation this AM to 1.5. WBC up to 24. Pain improved this AM. Tolerating PO diet without n/v this AM. Bcx with NG this AM. Ucx pending.       Objective:     Temp:  [98.7 °F (37.1 °C)-102 °F (38.9 °C)] 98.7 °F (37.1 °C)  Pulse:  [] 103  Resp:  [17-32] 17  SpO2:  [94 %-100 %] 94 %  BP: (101-189)/(51-84) 156/69     Body mass index is 33.03 kg/m².      Bladder Scan Volume (mL): (S) 0 mL (MD Hunt made aware bladder scan results) (10/15/24 4214)    Drains       Drain  Duration             Female External Urinary Catheter w/ Suction 10/15/24 1 day                     Physical Exam  Vitals and nursing note reviewed.   HENT:      Head: Atraumatic.      Nose: Nose normal.   Eyes:      Extraocular Movements: Extraocular movements intact.   Cardiovascular:      Rate and Rhythm: Normal rate.   Pulmonary:      Effort: Pulmonary effort is normal.    Abdominal:      General: Abdomen is flat. There is no distension.      Tenderness: There is no abdominal tenderness. There is no right CVA tenderness or left CVA tenderness.   Musculoskeletal:         General: Normal range of motion.      Cervical back: Normal range of motion.   Skin:     Coloration: Skin is not jaundiced.   Neurological:      General: No focal deficit present.      Mental Status: She is alert and oriented to person, place, and time.   Psychiatric:         Mood and Affect: Mood normal.         Behavior: Behavior normal.           Significant Labs:    BMP:  Recent Labs   Lab 10/13/24  2240 10/15/24  0351 10/16/24  0244    142 138   K 3.4* 3.2* 4.3    108 106   CO2 24 18* 19*   BUN 6 6 12   CREATININE 1.0 1.2 1.5*   CALCIUM 9.6 10.0 8.8       CBC:   Recent Labs   Lab 10/13/24  2240 10/15/24  0351 10/16/24  0244   WBC 7.26 16.62* 24.25*   HGB 14.3 14.1 12.0   HCT 42.9 41.7 36.8*    246 202       All pertinent labs results from the past 24 hours have been reviewed.    Significant Imaging:  All pertinent imaging results/findings from the past 24 hours have been reviewed.                  Assessment/Plan:     Nephrolithiasis  - no acute interventions from Urology at this time  - ureteral stent removed this AM  - recommend broad-spectrum IV antibiotics, follow up all cultures and tailor antibiotics as needed. Continue CTX and Vanc.  - all remaining care per primary team  - please reach out to Urology with questions or concerns  - follow up with Dr. Saenz as scheduled        VTE Risk Mitigation (From admission, onward)           Ordered     IP VTE HIGH RISK PATIENT  Once         10/15/24 0738     Place sequential compression device  Until discontinued         10/15/24 0738                    Bran Gil MD  Urology  [facility] - Telemetry

## 2024-10-16 NOTE — ASSESSMENT & PLAN NOTE
"Bacteremia   This patient does have evidence of infective focus  My overall impression is sepsis.  Source: Urinary Tract  Antibiotics given-   Antibiotics (72h ago, onward)      Start     Stop Route Frequency Ordered    10/16/24 1430  vancomycin 2 g in dextrose 5 % 500 mL IVPB         -- IV Every 24 hours (non-standard times) 10/15/24 1346    10/15/24 1427  vancomycin - pharmacy to dose  (vancomycin IVPB (PEDS and ADULTS))        Placed in "And" Linked Group    -- IV pharmacy to manage frequency 10/15/24 1327    10/15/24 0800  cefTRIAXone (ROCEPHIN) 2 g in D5W 100 mL IVPB (MB+)  ( Urinary Tract Infection (UTI))         10/20/24 0859 IV Every 24 hours (non-standard times) 10/15/24 0738          Latest lactate reviewed-  Recent Labs   Lab 10/15/24  0350 10/15/24  1120   LACTATE  --  2.9*   POCLAC 2.3*  --        Organ dysfunction indicated by  none    Fluid challenge Ideal Body Weight- The patient's ideal body weight is Ideal body weight: 65 kg (143 lb 6.5 oz) which will be used to calculate fluid bolus of 30 ml/kg for treatment of septic shock.      Post- resuscitation assessment Yes Perfusion exam was performed within 6 hours of septic shock presentation after bolus shows Adequate tissue perfusion assessed by invasive monitoring       Will Not start Pressors- Levophed for MAP of 65  Source control achieved by: IV abx        CT abdomen renal protocol  1. On the right, the patient is status post placement of a ureteral stent. Previously seen distal right renal calculus on the 10/10/2024 CT is not definitely appreciated.  Curvilinear calcification in involving right midpole calyx appears similar to prior.  Smaller adjoining punctate calcification/stone on the prior study is not definitely seen on the current study.  Scratch nonspecific right-sided perinephric inflammatory change. Mild urothelial thickening in the visualized upper ureter on the right.  Findings could relate to sequela of recent instrumentation, noting " that infectious process would not be excluded in the appropriate clinical context.  2. Indeterminate hypodense lesion right hepatic lobe similar to 10/10/2024 and 07/13/2024 CT.  Further characterization dedicated hepatic mass protocol MRI or CT would be recommended.  3. Details of additional chronic and incidental findings, as provided in the body of repor    Blood culture with Gram-negative rods-repeat blood culture on 10/16.  Continue vanc/ceftriaxone

## 2024-10-16 NOTE — ASSESSMENT & PLAN NOTE
- no acute interventions from Urology at this time  - ureteral stent removed this AM  - recommend broad-spectrum IV antibiotics, follow up all cultures and tailor antibiotics as needed. Continue CTX and Vanc.  - all remaining care per primary team  - please reach out to Urology with questions or concerns  - follow up with Dr. Saenz as scheduled

## 2024-10-16 NOTE — PROGRESS NOTES
Smoking cessation education note: Pt is a 1 pk/day cigarette smoker x 45 yrs. She states that she rolls her own cigarettes, and uses a menthol-flavored filter. 21 mg nicotine patch ordered Q day. Pt states not ready to quit.  She states that she has a roommate who also smokes, and that they both smoke indoors (inside their home).  Education provided on the risks of continued smoking and benefits of quitting. Handout provided. Strongly urged pt to consider a quit attempt and to contact clinic if additional resources are needed.

## 2024-10-16 NOTE — PLAN OF CARE
OT evaluation completed. Patient with PLOF of independence with ADLs/IADLs and with hx of 0 falls in the past 3 months. On evaluation this date patient limited by pain 8/10. Pt able to perform lower body dressing with Max A and lateral side steps with CGA. Skilled acute OT to follow. Recommend TBD. DME anticipated recommendations: TBD.  Problem: Occupational Therapy  Goal: Occupational Therapy Goal    Goals to be met by: 11/13/2024     Patient will increase functional independence with ADLs by performing:    UE Dressing with [place].  LE Dressing with [place].  Toileting from toilet with Modified [place] for hygiene and clothing management.   Toilet transfer to toilet with [place].  100% reciprocation of at least two non pharmacological pain management strategies.  Functional mobility x5 minutes with SBA with least restrictive device and no adverse response.       Outcome: Progressing

## 2024-10-16 NOTE — SUBJECTIVE & OBJECTIVE
Interval History:  Awake, alert, reports fever overnight, still having some nausea and occasional vomiting  Renal function up trending- bolus fluid and continue p.o. replacement  101 fever, WBC up trending, blood culture with Gram-negative rods-on ceftriaxone and vancomycin- continue.  Repeat blood culture  Replace magnesium  Appreciate urology-stent removed    Review of Systems   Constitutional:  Positive for activity change, appetite change, fatigue and fever. Negative for chills.   Respiratory:  Negative for shortness of breath.    Gastrointestinal:  Positive for abdominal pain, nausea and vomiting.   Genitourinary:  Negative for difficulty urinating, dysuria, flank pain, pelvic pain and urgency.   Musculoskeletal:  Negative for arthralgias.   Skin:  Negative for pallor and rash.   Neurological:  Positive for weakness.   Psychiatric/Behavioral:  Negative for confusion.      Objective:     Vital Signs (Most Recent):  Temp: (!) 101.7 °F (38.7 °C) (10/16/24 0750)  Pulse: 100 (10/16/24 0738)  Resp: 18 (10/16/24 0738)  BP: (!) 116/56 (10/16/24 0738)  SpO2: 97 % (10/16/24 0738) Vital Signs (24h Range):  Temp:  [98.7 °F (37.1 °C)-102 °F (38.9 °C)] 101.7 °F (38.7 °C)  Pulse:  [] 100  Resp:  [17-32] 18  SpO2:  [94 %-100 %] 97 %  BP: (101-189)/(51-84) 116/56     Weight: 100 kg (220 lb 7.4 oz)  Body mass index is 33.03 kg/m².    Intake/Output Summary (Last 24 hours) at 10/16/2024 0825  Last data filed at 10/16/2024 0403  Gross per 24 hour   Intake 2699 ml   Output 630 ml   Net 2069 ml         Physical Exam  HENT:      Head: Normocephalic and atraumatic.   Pulmonary:      Effort: Pulmonary effort is normal.   Abdominal:      General: Bowel sounds are normal.      Palpations: Abdomen is soft.      Tenderness: There is abdominal tenderness in the right lower quadrant.   Musculoskeletal:      Cervical back: Normal range of motion.      Right lower leg: No edema.      Left lower leg: No edema.   Skin:     Findings: Lesion  "present.   Neurological:      Mental Status: She is alert.   Psychiatric:         Mood and Affect: Mood normal.             Significant Labs: A1C:   Recent Labs   Lab 06/08/24  0626   HGBA1C 5.3     ABGs:   Recent Labs   Lab 10/15/24  0349   PH 7.444   PCO2 34.6*   HCO3 23.7*   POCSATURATED 82.5*   PO2 42.1     Blood Culture:   Recent Labs   Lab 10/15/24  0352 10/15/24  0402   LABBLOO No Growth to date No Growth to date     CBC:   Recent Labs   Lab 10/15/24  0351 10/16/24  0244   WBC 16.62* 24.25*   HGB 14.1 12.0   HCT 41.7 36.8*    202     CMP:   Recent Labs   Lab 10/15/24  0351 10/16/24  0244    138   K 3.2* 4.3    106   CO2 18* 19*    92   BUN 6 12   CREATININE 1.2 1.5*   CALCIUM 10.0 8.8   PROT 7.4 5.9*   ALBUMIN 3.5 2.7*   BILITOT 0.2 0.2   ALKPHOS 95 73   AST 11 13   ALT 10 8*   ANIONGAP 16 13     Lactic Acid:   Recent Labs   Lab 10/15/24  1120   LACTATE 2.9*     Lipase: No results for input(s): "LIPASE" in the last 48 hours.  Lipid Panel: No results for input(s): "CHOL", "HDL", "LDLCALC", "TRIG", "CHOLHDL" in the last 48 hours.  Magnesium:   Recent Labs   Lab 10/15/24  0351 10/16/24  0244   MG 1.5* 1.4*     Troponin: No results for input(s): "TROPONINI", "TROPONINIHS" in the last 48 hours.  TSH: No results for input(s): "TSH" in the last 4320 hours.  Urine Culture: No results for input(s): "LABURIN" in the last 48 hours.  Urine Studies:   Recent Labs   Lab 10/15/24  0906   COLORU Yellow   APPEARANCEUA Hazy*   PHUR 6.0   SPECGRAV >1.030*   PROTEINUA 3+*   GLUCUA Trace*   KETONESU 1+*   BILIRUBINUA Negative   OCCULTUA 3+*   NITRITE Negative   UROBILINOGEN 2.0-3.0*   LEUKOCYTESUR 1+*   RBCUA >100*   WBCUA 20*   BACTERIA Occasional   SQUAMEPITHEL 3   HYALINECASTS 0       Significant Imaging: I have reviewed all pertinent imaging results/findings within the past 24 hours.  "

## 2024-10-16 NOTE — PLAN OF CARE
" went to meet with patient. Patient reports she is independent and lives at home with a roommate. She does not use any DME or have Home Health. She does not drive, but she does use Medicaid Transportation. Patient will need transportation home at discharge. She does not recall her PCP assigned. Patient is agreeable to PCP follow-up with [facility]. She tells me her PCP that was assigned is "too close" per Medicaid and they won't approve her rides to the closest clinic. Patient encouraged to call with any questions or concerns.  will continue to follow patient through transitions of care and assist with any discharge needs.     Other Contacts    Name Relation Home Work Mobile   Quin Tarango Daughter   [phone]     Future Appointments   Date Time Provider Department Center   11/14/2024 10:00 AM [facility] US1 [facility]   11/21/2024  9:00 AM Humza Saenz MD [facility] UROLOGY Anyi Arshad         10/16/24 1411   Discharge Assessment   Assessment Type Discharge Planning Assessment   Confirmed/corrected address, phone number and insurance Yes   Confirmed Demographics Correct on Facesheet   Source of Information patient   People in Home other relative(s)  (Roommate)   Facility Arrived From: Home   Do you expect to return to your current living situation? Yes   Do you have help at home or someone to help you manage your care at home? Yes   Who are your caregiver(s) and their phone number(s)? Martín--Roommate Phone#[phone]   Prior to hospitilization cognitive status: Alert/Oriented   Current cognitive status: Alert/Oriented   Walking or Climbing Stairs Difficulty no   Dressing/Bathing Difficulty no   Equipment Currently Used at Home none   Do you take prescription medications? Yes   Do you have prescription coverage? Yes   Do you have any problems affording any of your prescribed medications? No   Is the patient taking medications as prescribed? yes   Who is going to help you " get home at discharge? Patient will need a ride home at discharge.   How do you get to doctors appointments? health plan transportation   Are you on dialysis? No   Do you take coumadin? No   Discharge Plan A Home   Discharge Plan B Home with family   DME Needed Upon Discharge  none   Discharge Plan discussed with: Patient   Transition of Care Barriers Transportation   Physical Activity   On average, how many days per week do you engage in moderate to strenuous exercise (like a brisk walk)? Pt Declined   On average, how many minutes do you engage in exercise at this level? Pt Declined   Financial Resource Strain   How hard is it for you to pay for the very basics like food, housing, medical care, and heating? Not very   Housing Stability   In the last 12 months, was there a time when you were not able to pay the mortgage or rent on time? N   At any time in the past 12 months, were you homeless or living in a shelter (including now)? N   Transportation Needs   Has the lack of transportation kept you from medical appointments, meetings, work or from getting things needed for daily living? No   Food Insecurity   Within the past 12 months, you worried that your food would run out before you got the money to buy more. Never true   Within the past 12 months, the food you bought just didn't last and you didn't have money to get more. Never true   Stress   Do you feel stress - tense, restless, nervous, or anxious, or unable to sleep at night because your mind is troubled all the time - these days? Pt Declined   Social Isolation   How often do you feel lonely or isolated from those around you?  Patient declined   Alcohol Use   Q1: How often do you have a drink containing alcohol? Pt Declined   Q2: How many drinks containing alcohol do you have on a typical day when you are drinking? Pt Declined   Q3: How often do you have six or more drinks on one occasion? Pt Declined   Utilities   In the past 12 months has the electric,  gas, oil, or water company threatened to shut off services in your home? Pt Declined   Health Literacy   How often do you need to have someone help you when you read instructions, pamphlets, or other written material from your doctor or pharmacy? Patient declines to respond   OTHER   Name(s) of People in Home Martín--Roommate [phone]     Floresita Guillen RN    [phone]

## 2024-10-16 NOTE — PROGRESS NOTES
[facility]                   Pharmacy  Pharmacy Vancomycin/AG Sign-off    Therapy with vancomycin completed and/or consult discontinued by provider.  Pharmacy will sign off, please re-consult as needed. Thank you for allowing us to participate in this patient's care.     Baldev Rodgers, PharmD    [phone]

## 2024-10-16 NOTE — AI DETERIORATION ALERT
Artificial Intelligence Notification  Sis      Admit Date: 10/15/2024  LOS: 0  Code Status: Full Code   Date of Consult: 10/15/2024  : 1965  Age: 59 y.o.  Weight:   Wt Readings from Last 1 Encounters:   10/15/24 90.7 kg (200 lb)     Sex: female  [facility]: [facility]:   MRN: [id number]  Attending Physician: Madelin Hunt  Primary Service: Networked reference to record PCT   Time AI Alert Received: ***  Time at Bedside: ***           ***      Vital Signs (Most Recent):  Temp: (!) 102 °F (38.9 °C) (10/15/24 1942)  Pulse: (!) 116 (10/15/24 1942)  Resp: (!) 32 (10/15/24 1942)  BP: (!) 189/84 (10/15/24 1942)  SpO2: 97 % (10/15/24 1942) Vital Signs (24h Range):  Temp:  [99.4 °F (37.4 °C)-102 °F (38.9 °C)] 102 °F (38.9 °C)  Pulse:  [] 116  Resp:  [18-32] 32  SpO2:  [95 %-100 %] 97 %  BP: (105-189)/(56-92) 189/84         This encounter was triggered by an Artificial Intelligence Notification.     Artificial Intelligence alert discussed with Primary team:  Name ***      Evaluation: ***    Disposition: ***

## 2024-10-16 NOTE — SUBJECTIVE & OBJECTIVE
Interval History: NAEON. AFVSS this AM. Cr with slight elevation this AM to 1.5. WBC up to 24. Pain improved this AM. Tolerating PO diet without n/v this AM. Bcx with NG this AM. Ucx pending.       Objective:     Temp:  [98.7 °F (37.1 °C)-102 °F (38.9 °C)] 98.7 °F (37.1 °C)  Pulse:  [] 103  Resp:  [17-32] 17  SpO2:  [94 %-100 %] 94 %  BP: (101-189)/(51-84) 156/69     Body mass index is 33.03 kg/m².      Bladder Scan Volume (mL): (S) 0 mL (MD Hunt made aware bladder scan results) (10/15/24 9239)    Drains       Drain  Duration             Female External Urinary Catheter w/ Suction 10/15/24 1 day                     Physical Exam  Vitals and nursing note reviewed.   HENT:      Head: Atraumatic.      Nose: Nose normal.   Eyes:      Extraocular Movements: Extraocular movements intact.   Cardiovascular:      Rate and Rhythm: Normal rate.   Pulmonary:      Effort: Pulmonary effort is normal.   Abdominal:      General: Abdomen is flat. There is no distension.      Tenderness: There is no abdominal tenderness. There is no right CVA tenderness or left CVA tenderness.   Musculoskeletal:         General: Normal range of motion.      Cervical back: Normal range of motion.   Skin:     Coloration: Skin is not jaundiced.   Neurological:      General: No focal deficit present.      Mental Status: She is alert and oriented to person, place, and time.   Psychiatric:         Mood and Affect: Mood normal.         Behavior: Behavior normal.           Significant Labs:    BMP:  Recent Labs   Lab 10/13/24  2240 10/15/24  0351 10/16/24  0244    142 138   K 3.4* 3.2* 4.3    108 106   CO2 24 18* 19*   BUN 6 6 12   CREATININE 1.0 1.2 1.5*   CALCIUM 9.6 10.0 8.8       CBC:   Recent Labs   Lab 10/13/24  2240 10/15/24  0351 10/16/24  0244   WBC 7.26 16.62* 24.25*   HGB 14.3 14.1 12.0   HCT 42.9 41.7 36.8*    246 202       All pertinent labs results from the past 24 hours have been reviewed.    Significant  Imaging:  All pertinent imaging results/findings from the past 24 hours have been reviewed.

## 2024-10-16 NOTE — PROGRESS NOTES
[facility] Medicine  Progress Note    Patient Name: Josselyn Tarango  MRN: [id number]  Patient Class: IP- Inpatient   Admission Date: 10/15/2024  Length of Stay: 1 days  Attending Physician: Madelin Hunt*  Primary Care Provider: Renetta, Primary Doctor        Subjective:     Principal Problem:Sepsis        HPI:  Josselyn Tarango is a 60 y/o F with PMH of prediabetes, diet controlled, nicotine dependence, ureteral lithiasis, s/p ureteral stent placement on 10/14, presents with 1 day history of generalized malaise, with associated chills with rigors, fever, nausea, vomiting, decreased appetite and abdominal pain.  Patient stated symptoms started few hours after her procedure-right ureteral stent placement.  She had received perioperative IV antibiotics, and continued with her p.o. antibiotics at home.  WBC 16.2, H/H 14/41, platelets 246, potassium 3.2, BUN/creatinine 1.2/10, LFT within normal limit chest x-ray unremarkable, CT renal stone study reports no stone bed nonspecific right-sided perinephric inflammatory changes.  Mild urothelial thickening in the visualized ureter of the right.  Patient is started on sepsis protocol, IV fluid and IV antibiotics.  Admit hospital medicine service    Overview/Hospital Course:  No notes on file    Interval History:  Awake, alert, reports fever overnight, still having some nausea and occasional vomiting  Renal function up trending- bolus fluid and continue p.o. replacement  101 fever, WBC up trending, blood culture with Gram-negative rods-on ceftriaxone and vancomycin- continue.  Repeat blood culture  Replace magnesium  Appreciate urology-stent removed    Review of Systems   Constitutional:  Positive for activity change, appetite change, fatigue and fever. Negative for chills.   Respiratory:  Negative for shortness of breath.    Gastrointestinal:  Positive for abdominal pain, nausea and vomiting.   Genitourinary:  Negative for difficulty urinating,  dysuria, flank pain, pelvic pain and urgency.   Musculoskeletal:  Negative for arthralgias.   Skin:  Negative for pallor and rash.   Neurological:  Positive for weakness.   Psychiatric/Behavioral:  Negative for confusion.      Objective:     Vital Signs (Most Recent):  Temp: (!) 101.7 °F (38.7 °C) (10/16/24 0750)  Pulse: 100 (10/16/24 0738)  Resp: 18 (10/16/24 0738)  BP: (!) 116/56 (10/16/24 0738)  SpO2: 97 % (10/16/24 0738) Vital Signs (24h Range):  Temp:  [98.7 °F (37.1 °C)-102 °F (38.9 °C)] 101.7 °F (38.7 °C)  Pulse:  [] 100  Resp:  [17-32] 18  SpO2:  [94 %-100 %] 97 %  BP: (101-189)/(51-84) 116/56     Weight: 100 kg (220 lb 7.4 oz)  Body mass index is 33.03 kg/m².    Intake/Output Summary (Last 24 hours) at 10/16/2024 0825  Last data filed at 10/16/2024 0403  Gross per 24 hour   Intake 2699 ml   Output 630 ml   Net 2069 ml         Physical Exam  HENT:      Head: Normocephalic and atraumatic.   Pulmonary:      Effort: Pulmonary effort is normal.   Abdominal:      General: Bowel sounds are normal.      Palpations: Abdomen is soft.      Tenderness: There is abdominal tenderness in the right lower quadrant.   Musculoskeletal:      Cervical back: Normal range of motion.      Right lower leg: No edema.      Left lower leg: No edema.   Skin:     Findings: Lesion present.   Neurological:      Mental Status: She is alert.   Psychiatric:         Mood and Affect: Mood normal.             Significant Labs: A1C:   Recent Labs   Lab 06/08/24  0626   HGBA1C 5.3     ABGs:   Recent Labs   Lab 10/15/24  0349   PH 7.444   PCO2 34.6*   HCO3 23.7*   POCSATURATED 82.5*   PO2 42.1     Blood Culture:   Recent Labs   Lab 10/15/24  0352 10/15/24  0402   LABBLOO No Growth to date No Growth to date     CBC:   Recent Labs   Lab 10/15/24  0351 10/16/24  0244   WBC 16.62* 24.25*   HGB 14.1 12.0   HCT 41.7 36.8*    202     CMP:   Recent Labs   Lab 10/15/24  0351 10/16/24  0244    138   K 3.2* 4.3    106   CO2 18* 19*  "   92   BUN 6 12   CREATININE 1.2 1.5*   CALCIUM 10.0 8.8   PROT 7.4 5.9*   ALBUMIN 3.5 2.7*   BILITOT 0.2 0.2   ALKPHOS 95 73   AST 11 13   ALT 10 8*   ANIONGAP 16 13     Lactic Acid:   Recent Labs   Lab 10/15/24  1120   LACTATE 2.9*     Lipase: No results for input(s): "LIPASE" in the last 48 hours.  Lipid Panel: No results for input(s): "CHOL", "HDL", "LDLCALC", "TRIG", "CHOLHDL" in the last 48 hours.  Magnesium:   Recent Labs   Lab 10/15/24  0351 10/16/24  0244   MG 1.5* 1.4*     Troponin: No results for input(s): "TROPONINI", "TROPONINIHS" in the last 48 hours.  TSH: No results for input(s): "TSH" in the last 4320 hours.  Urine Culture: No results for input(s): "LABURIN" in the last 48 hours.  Urine Studies:   Recent Labs   Lab 10/15/24  0906   COLORU Yellow   APPEARANCEUA Hazy*   PHUR 6.0   SPECGRAV >1.030*   PROTEINUA 3+*   GLUCUA Trace*   KETONESU 1+*   BILIRUBINUA Negative   OCCULTUA 3+*   NITRITE Negative   UROBILINOGEN 2.0-3.0*   LEUKOCYTESUR 1+*   RBCUA >100*   WBCUA 20*   BACTERIA Occasional   SQUAMEPITHEL 3   HYALINECASTS 0       Significant Imaging: I have reviewed all pertinent imaging results/findings within the past 24 hours.    Assessment/Plan:      * Sepsis  Bacteremia   This patient does have evidence of infective focus  My overall impression is sepsis.  Source: Urinary Tract  Antibiotics given-   Antibiotics (72h ago, onward)      Start     Stop Route Frequency Ordered    10/16/24 1430  vancomycin 2 g in dextrose 5 % 500 mL IVPB         -- IV Every 24 hours (non-standard times) 10/15/24 1346    10/15/24 1427  vancomycin - pharmacy to dose  (vancomycin IVPB (PEDS and ADULTS))        Placed in "And" Linked Group    -- IV pharmacy to manage frequency 10/15/24 1327    10/15/24 0800  cefTRIAXone (ROCEPHIN) 2 g in D5W 100 mL IVPB (MB+)  ( Urinary Tract Infection (UTI))         10/20/24 0859 IV Every 24 hours (non-standard times) 10/15/24 0738          Latest lactate reviewed-  Recent Labs "   Lab 10/15/24  0350 10/15/24  1120   LACTATE  --  2.9*   POCLAC 2.3*  --        Organ dysfunction indicated by  none    Fluid challenge Ideal Body Weight- The patient's ideal body weight is Ideal body weight: 65 kg (143 lb 6.5 oz) which will be used to calculate fluid bolus of 30 ml/kg for treatment of septic shock.      Post- resuscitation assessment Yes Perfusion exam was performed within 6 hours of septic shock presentation after bolus shows Adequate tissue perfusion assessed by invasive monitoring       Will Not start Pressors- Levophed for MAP of 65  Source control achieved by: IV abx        CT abdomen renal protocol  1. On the right, the patient is status post placement of a ureteral stent. Previously seen distal right renal calculus on the 10/10/2024 CT is not definitely appreciated.  Curvilinear calcification in involving right midpole calyx appears similar to prior.  Smaller adjoining punctate calcification/stone on the prior study is not definitely seen on the current study.  Scratch nonspecific right-sided perinephric inflammatory change. Mild urothelial thickening in the visualized upper ureter on the right.  Findings could relate to sequela of recent instrumentation, noting that infectious process would not be excluded in the appropriate clinical context.  2. Indeterminate hypodense lesion right hepatic lobe similar to 10/10/2024 and 07/13/2024 CT.  Further characterization dedicated hepatic mass protocol MRI or CT would be recommended.  3. Details of additional chronic and incidental findings, as provided in the body of repor    Blood culture with Gram-negative rods-repeat blood culture on 10/16.  Continue vanc/ceftriaxone    JUAN LUIS (acute kidney injury)  JUAN LUIS is likely due to pre-renal azotemia due to intravascular volume depletion. Baseline creatinine is  1,0 . Most recent creatinine and eGFR are listed below.  Recent Labs     10/13/24  2240 10/15/24  0351 10/16/24  0244   CREATININE 1.0 1.2 1.5*    EGFRNORACEVR >60 52* 40*      Plan  - JUAN LUIS is improving  - Avoid nephrotoxins and renally dose meds for GFR listed above  - Monitor urine output, serial BMP, and adjust therapy as needed  -     Hypokalemia  Patient's most recent potassium results are listed below.   Recent Labs     10/13/24  2240 10/15/24  0351   K 3.4* 3.2*     Plan  - Replete potassium per protocol  - Monitor potassium Daily  - Patient's hypokalemia is improving  -     Pre-diabetes  HbA1c 5.3 four months ago  Continue diet controlled      Dysphagia  Consult SLP- appreciate recs      Dependence on nicotine from cigarettes  Dangers of cigarette smoking were reviewed with patient in detail. Patient was Counseled for 3-10 minutes. Nicotine replacement options were discussed. Nicotine replacement was discussed- prescribed    Nephrolithiasis  History of renal stone s/p ureteral stent placement for ureterolithiasis        VTE Risk Mitigation (From admission, onward)           Ordered     IP VTE HIGH RISK PATIENT  Once         10/15/24 0738     Place sequential compression device  Until discontinued         10/15/24 0738                    Discharge Planning   PIERO:      Code Status: Full Code   Is the patient medically ready for discharge?:     Reason for patient still in hospital (select all that apply): Patient trending condition                     Madelin Hutn MD  Department of Hospital Medicine   [place] - Telemetry

## 2024-10-16 NOTE — PT/OT/SLP EVAL
Occupational Therapy   Evaluation and Treat     Name: Josselyn Tarango  MRN: [id number]  Admitting Diagnosis: Sepsis  Recent Surgery: * No surgery found *      Recommendations:     Discharge Recommendations:  (TBD pending patient progress)  Discharge Equipment Recommendations:  to be determined by next level of care  Barriers to discharge:  Other (Comment) (Pain; limited mobility; increased assistance of self care)    Assessment:     Josselyn Tarango is a 59 y.o. female with a medical diagnosis of Sepsis.  She presents with The primary encounter diagnosis was Sepsis, due to unspecified organism, unspecified whether acute organ dysfunction present. Diagnoses of SIRS (systemic inflammatory response syndrome) and History of kidney stones were also pertinent to this visit.  Performance deficits affecting function: weakness, decreased safety awareness, impaired balance, impaired endurance, pain, impaired self care skills, decreased coordination, decreased ROM, impaired functional mobility, decreased upper extremity function, impaired coordination, decreased lower extremity function, gait instability.      OT evaluation completed.  On evaluation this date patient limited by pain 8/10. Pt able to perform lower body dressing with Max A and lateral side steps with CGA. Skilled acute OT to follow. Recommend TBD. DME anticipated recommendations: TBD.     Rehab Prognosis: Good; patient would benefit from acute skilled OT services to address these deficits and reach maximum level of function.       Plan:     Patient to be seen 3 x/week to address the above listed problems via self-care/home management, therapeutic activities, therapeutic exercises  Plan of Care Expires: 11/13/24  Plan of Care Reviewed with: patient    Subjective     Chief Complaint: Pain in abdomen  Patient/Family Comments/goals: To return home    Occupational Profile:  Living Environment: Pt lives [place] [facility] with 5 steps to enter with rails on both sides with a  roommate.   Previous level of function: Patient with PLOF of independence with ADLs/IADLs and with hx of 0 falls in the past 3 months. Pt reports increased weakness prior to hospitalization with recent bladder incontinence ( 2 months).   Roles and Routines: Works from home part time  Equipment Used at Home: none  Assistance upon Discharge: Roommate    Pain/Comfort:  Pain Rating 1: 8/10  Location - Side 1: Right  Location 1: abdomen  Pain Addressed 1: Reposition, Distraction, Cessation of Activity, Nurse notified  Pain Rating Post-Intervention 1: 8/10      Objective:     Communicated with: Nurse prior to session.  Patient found HOB elevated with peripheral IV, telemetry, PureWick upon OT entry to room.    General Precautions: Standard, fall  Orthopedic Precautions: N/A  Braces: N/A  Respiratory Status: Room air    Occupational Performance:    Bed Mobility:    Patient completed Supine to Sit with minimum assistance  Patient completed Sit to Supine with moderate assistance    Functional Mobility/Transfers:  Patient completed Sit <> Stand Transfer with contact guard assistance  with  no assistive device x2 trials.   Functional Mobility: Lateral side steps with CGA with no AD.     Activities of Daily Living:  Lower Body Dressing: maximal assistance partial dressing, socks.   Upper body dressing: moderate assistance  Feeding: Independent: self feeding    Cognitive/Visual Perceptual:  Cognitive/Psychosocial Skills:     -       Oriented to: Person, Place, Time, and Situation   -       Follows Commands/attention:Follows two-step commands    Physical Exam:  Upper Extremity Range of Motion: Limited assessment due to pain. Moves BUE anti-gravity in all planes but does not participate in formal MMT due to global discomfort.     AMPAC 6 Click ADL:  AMPAC Total Score: 16    Treatment & Education:  Patient educated on role of OT/ POC development.   Occupational profile developed.   Patient guided to transition eob for limited MMT  assessment due to global discomfort.   Pt reported dizziness while sitting eob.   Pt's vitals monitored:      10/16/24 1534 10/16/24 1540   Vital Signs   Pulse 108 (!) 117   SpO2 95 % 95 %   /60 134/61   MAP (mmHg) 87 88   BP Location Left arm Left arm   Patient Position Lying Sitting     Initiated ADL / functional mobility training as above.   Educated patient on importance of out of bed mobility and movement/repositioning throughout the day.  Answered questions within scope.      Patient left HOB elevated with all lines intact, call button in reach, and nurse notified    GOALS:   Multidisciplinary Problems       Occupational Therapy Goals          Problem: Occupational Therapy    Goal Priority Disciplines Outcome Interventions   Occupational Therapy Goal     OT, PT/OT Progressing    Description: Goals to be met by: 11/13/2024     Patient will increase functional independence with ADLs by performing:    UE Dressing with [place].  LE Dressing with [place].  Toileting from toilet with Modified [place] for hygiene and clothing management.   Toilet transfer to toilet with [place].  100% reciprocation of at least two non pharmacological pain management strategies.  Functional mobility x5 minutes with SBA with least restrictive device and no adverse response.                            History:     Past Medical History:   Diagnosis Date    Diabetes mellitus     diet control    Digestive disorder     Hx: gastric ulcer         Past Surgical History:   Procedure Laterality Date    CYSTOSCOPY W/ URETERAL STENT PLACEMENT Left 6/7/2024    Procedure: CYSTOSCOPY, left retrograde pyelogram, left JJ stent;  Surgeon: Humza Saenz MD;  Location: [facility];  Service: Urology;  Laterality: Left;  MAC vs choice    CYSTOURETEROSCOPY, WITH HOLMIUM LASER LITHOTRIPSY OF URETERAL CALCULUS AND STENT INSERTION Left 6/17/2024    Procedure: Cystoscopy, left retrograde pyelogram, left ureteroscopy with holmium  laser lithotripsy, stone basket extraction, left JJ stent;  Surgeon: Humza Saenz MD;  Location: [facility] OR;  Service: Urology;  Laterality: Left;  Element    CYSTOURETEROSCOPY, WITH HOLMIUM LASER LITHOTRIPSY OF URETERAL CALCULUS AND STENT INSERTION Right 10/14/2024    Procedure: CYSTOURETEROSCOPY, WITH HOLMIUM LASER LITHOTRIPSY OF URETERAL CALCULUS AND STENT INSERTION;  Surgeon: Humza Saenz MD;  Location: [facility] OR;  Service: Urology;  Laterality: Right;  LMA    EXTRACTION - STONE Left 6/17/2024    Procedure: EXTRACTION - STONE;  Surgeon: Humza Saenz MD;  Location: [facility] OR;  Service: Urology;  Laterality: Left;    RETROGRADE PYELOGRAPHY N/A 6/7/2024    Procedure: PYELOGRAM, RETROGRADE;  Surgeon: Humza Saenz MD;  Location: [facility] OR;  Service: Urology;  Laterality: N/A;    RETROGRADE PYELOGRAPHY N/A 6/17/2024    Procedure: PYELOGRAM, RETROGRADE;  Surgeon: Humza Saenz MD;  Location: [facility] OR;  Service: Urology;  Laterality: N/A;    RETROGRADE PYELOGRAPHY  10/14/2024    Procedure: PYELOGRAM, RETROGRADE;  Surgeon: Humza Saenz MD;  Location: [facility];  Service: Urology;;       Time Tracking:     OT Date of Treatment: 10/16/24  OT Start Time: 1528  OT Stop Time: 1550  OT Total Time (min): 22 min    Billable Minutes:Evaluation 14  Therapeutic Activity 8    10/16/2024

## 2024-10-17 LAB
ALBUMIN SERPL BCP-MCNC: 2.1 G/DL (ref 3.5–5.2)
ALP SERPL-CCNC: 73 U/L (ref 55–135)
ALT SERPL W/O P-5'-P-CCNC: 8 U/L (ref 10–44)
ANION GAP SERPL CALC-SCNC: 9 MMOL/L (ref 8–16)
AST SERPL-CCNC: 10 U/L (ref 10–40)
BASOPHILS # BLD AUTO: 0.03 K/UL (ref 0–0.2)
BASOPHILS NFR BLD: 0.2 % (ref 0–1.9)
BILIRUB SERPL-MCNC: 0.2 MG/DL (ref 0.1–1)
BUN SERPL-MCNC: 15 MG/DL (ref 6–20)
CALCIUM SERPL-MCNC: 8.3 MG/DL (ref 8.7–10.5)
CHLORIDE SERPL-SCNC: 107 MMOL/L (ref 95–110)
CO2 SERPL-SCNC: 22 MMOL/L (ref 23–29)
CREAT SERPL-MCNC: 1.2 MG/DL (ref 0.5–1.4)
DIFFERENTIAL METHOD BLD: ABNORMAL
EOSINOPHIL # BLD AUTO: 0 K/UL (ref 0–0.5)
EOSINOPHIL NFR BLD: 0.2 % (ref 0–8)
ERYTHROCYTE [DISTWIDTH] IN BLOOD BY AUTOMATED COUNT: 17.3 % (ref 11.5–14.5)
EST. GFR  (NO RACE VARIABLE): 52 ML/MIN/1.73 M^2
GLUCOSE SERPL-MCNC: 103 MG/DL (ref 70–110)
HCT VFR BLD AUTO: 32.4 % (ref 37–48.5)
HGB BLD-MCNC: 10.8 G/DL (ref 12–16)
IMM GRANULOCYTES # BLD AUTO: 0.09 K/UL (ref 0–0.04)
IMM GRANULOCYTES NFR BLD AUTO: 0.7 % (ref 0–0.5)
LYMPHOCYTES # BLD AUTO: 1.5 K/UL (ref 1–4.8)
LYMPHOCYTES NFR BLD: 10.9 % (ref 18–48)
MAGNESIUM SERPL-MCNC: 1.8 MG/DL (ref 1.6–2.6)
MCH RBC QN AUTO: 29.3 PG (ref 27–31)
MCHC RBC AUTO-ENTMCNC: 33.3 G/DL (ref 32–36)
MCV RBC AUTO: 88 FL (ref 82–98)
MONOCYTES # BLD AUTO: 0.9 K/UL (ref 0.3–1)
MONOCYTES NFR BLD: 6.9 % (ref 4–15)
NEUTROPHILS # BLD AUTO: 11 K/UL (ref 1.8–7.7)
NEUTROPHILS NFR BLD: 81.1 % (ref 38–73)
NRBC BLD-RTO: 0 /100 WBC
PHOSPHATE SERPL-MCNC: 2.1 MG/DL (ref 2.7–4.5)
PLATELET # BLD AUTO: 157 K/UL (ref 150–450)
PMV BLD AUTO: 11.8 FL (ref 9.2–12.9)
POTASSIUM SERPL-SCNC: 3.5 MMOL/L (ref 3.5–5.1)
PROT SERPL-MCNC: 5 G/DL (ref 6–8.4)
RBC # BLD AUTO: 3.68 M/UL (ref 4–5.4)
SODIUM SERPL-SCNC: 138 MMOL/L (ref 136–145)
WBC # BLD AUTO: 13.54 K/UL (ref 3.9–12.7)

## 2024-10-17 PROCEDURE — 84100 ASSAY OF PHOSPHORUS: CPT | Performed by: FAMILY MEDICINE

## 2024-10-17 PROCEDURE — 85025 COMPLETE CBC W/AUTO DIFF WBC: CPT | Performed by: FAMILY MEDICINE

## 2024-10-17 PROCEDURE — 11000001 HC ACUTE MED/SURG PRIVATE ROOM

## 2024-10-17 PROCEDURE — 97530 THERAPEUTIC ACTIVITIES: CPT

## 2024-10-17 PROCEDURE — 97161 PT EVAL LOW COMPLEX 20 MIN: CPT

## 2024-10-17 PROCEDURE — S4991 NICOTINE PATCH NONLEGEND: HCPCS | Performed by: FAMILY MEDICINE

## 2024-10-17 PROCEDURE — 36415 COLL VENOUS BLD VENIPUNCTURE: CPT | Performed by: FAMILY MEDICINE

## 2024-10-17 PROCEDURE — 97116 GAIT TRAINING THERAPY: CPT

## 2024-10-17 PROCEDURE — 97535 SELF CARE MNGMENT TRAINING: CPT

## 2024-10-17 PROCEDURE — 25000003 PHARM REV CODE 250: Performed by: FAMILY MEDICINE

## 2024-10-17 PROCEDURE — 83735 ASSAY OF MAGNESIUM: CPT | Performed by: FAMILY MEDICINE

## 2024-10-17 PROCEDURE — 25000003 PHARM REV CODE 250

## 2024-10-17 PROCEDURE — 99900035 HC TECH TIME PER 15 MIN (STAT)

## 2024-10-17 PROCEDURE — 99232 SBSQ HOSP IP/OBS MODERATE 35: CPT | Mod: ,,, | Performed by: UROLOGY

## 2024-10-17 PROCEDURE — 63600175 PHARM REV CODE 636 W HCPCS: Performed by: FAMILY MEDICINE

## 2024-10-17 PROCEDURE — 94761 N-INVAS EAR/PLS OXIMETRY MLT: CPT

## 2024-10-17 PROCEDURE — 80053 COMPREHEN METABOLIC PANEL: CPT | Performed by: FAMILY MEDICINE

## 2024-10-17 RX ORDER — DEXTROMETHORPHAN/PSEUDOEPHED 2.5-7.5/.8
40 DROPS ORAL 4 TIMES DAILY PRN
Status: DISCONTINUED | OUTPATIENT
Start: 2024-10-17 | End: 2024-10-18 | Stop reason: HOSPADM

## 2024-10-17 RX ORDER — SUCRALFATE 1 G/10ML
1 SUSPENSION ORAL EVERY 6 HOURS
Status: DISCONTINUED | OUTPATIENT
Start: 2024-10-17 | End: 2024-10-18 | Stop reason: HOSPADM

## 2024-10-17 RX ORDER — SODIUM,POTASSIUM PHOSPHATES 280-250MG
1 POWDER IN PACKET (EA) ORAL ONCE
Status: COMPLETED | OUTPATIENT
Start: 2024-10-17 | End: 2024-10-17

## 2024-10-17 RX ORDER — SODIUM CHLORIDE 9 MG/ML
INJECTION, SOLUTION INTRAVENOUS CONTINUOUS
Status: DISCONTINUED | OUTPATIENT
Start: 2024-10-18 | End: 2024-10-17

## 2024-10-17 RX ORDER — SODIUM CHLORIDE 9 MG/ML
INJECTION, SOLUTION INTRAVENOUS CONTINUOUS
Status: DISCONTINUED | OUTPATIENT
Start: 2024-10-18 | End: 2024-10-18

## 2024-10-17 RX ORDER — ALUMINUM HYDROXIDE, MAGNESIUM HYDROXIDE, AND SIMETHICONE 1200; 120; 1200 MG/30ML; MG/30ML; MG/30ML
30 SUSPENSION ORAL
Status: DISCONTINUED | OUTPATIENT
Start: 2024-10-17 | End: 2024-10-18 | Stop reason: HOSPADM

## 2024-10-17 RX ORDER — PANTOPRAZOLE SODIUM 40 MG/1
40 TABLET, DELAYED RELEASE ORAL DAILY
Status: DISCONTINUED | OUTPATIENT
Start: 2024-10-17 | End: 2024-10-18 | Stop reason: HOSPADM

## 2024-10-17 RX ADMIN — Medication 600 ML: at 12:10

## 2024-10-17 RX ADMIN — IBUPROFEN 600 MG: 400 TABLET ORAL at 03:10

## 2024-10-17 RX ADMIN — SUCRALFATE 1 G: 1 SUSPENSION ORAL at 11:10

## 2024-10-17 RX ADMIN — Medication 600 ML: at 03:10

## 2024-10-17 RX ADMIN — IBUPROFEN 600 MG: 400 TABLET ORAL at 08:10

## 2024-10-17 RX ADMIN — ACETAMINOPHEN 650 MG: 325 TABLET ORAL at 08:10

## 2024-10-17 RX ADMIN — Medication 600 ML: at 08:10

## 2024-10-17 RX ADMIN — OXYBUTYNIN CHLORIDE 5 MG: 5 TABLET ORAL at 08:10

## 2024-10-17 RX ADMIN — TAMSULOSIN HYDROCHLORIDE 0.4 MG: 0.4 CAPSULE ORAL at 08:10

## 2024-10-17 RX ADMIN — OXYCODONE HYDROCHLORIDE AND ACETAMINOPHEN 2 TABLET: 5; 325 TABLET ORAL at 08:10

## 2024-10-17 RX ADMIN — OXYCODONE HYDROCHLORIDE AND ACETAMINOPHEN 2 TABLET: 5; 325 TABLET ORAL at 01:10

## 2024-10-17 RX ADMIN — CEFEPIME 2 G: 2 INJECTION, POWDER, FOR SOLUTION INTRAVENOUS at 03:10

## 2024-10-17 RX ADMIN — OXYBUTYNIN CHLORIDE 5 MG: 5 TABLET ORAL at 03:10

## 2024-10-17 RX ADMIN — PANTOPRAZOLE SODIUM 40 MG: 40 TABLET, DELAYED RELEASE ORAL at 03:10

## 2024-10-17 RX ADMIN — POTASSIUM & SODIUM PHOSPHATES POWDER PACK 280-160-250 MG 1 PACKET: 280-160-250 PACK at 03:10

## 2024-10-17 RX ADMIN — ALUMINUM HYDROXIDE, MAGNESIUM HYDROXIDE, AND SIMETHICONE 30 ML: 200; 200; 20 SUSPENSION ORAL at 04:10

## 2024-10-17 RX ADMIN — NICOTINE 1 PATCH: 21 PATCH, EXTENDED RELEASE TRANSDERMAL at 08:10

## 2024-10-17 RX ADMIN — SUCRALFATE 1 G: 1 SUSPENSION ORAL at 05:10

## 2024-10-17 RX ADMIN — ALUMINUM HYDROXIDE, MAGNESIUM HYDROXIDE, AND SIMETHICONE 30 ML: 200; 200; 20 SUSPENSION ORAL at 08:10

## 2024-10-17 NOTE — PROGRESS NOTES
[facility]  Urology  Progress Note    Patient Name: Josselyn Tarango  MRN: [id number]  Admission Date: 10/15/2024  Hospital Length of Stay: 2 days  Code Status: Full Code   Attending Provider: Madelin Hunt*   Primary Care Physician: Renetta, Primary Doctor    Subjective:     HPI:  Josselyn Tarango is a 59-year-old female who underwent right ureteroscopy with stent placement on 10/14/2024.  She now returns with concerns for sepsis following her procedure.  Inside the patient is currently afebrile though had a fever as high as 101.7 while in the ED as well as mild hypotension and tachycardia.  White count 16, creatinine 1.2, urine with occasional bacteria though contaminated with squames, nitrite negative.  Urine culture and blood cultures pending.  CT scan with a right ureteral stent in place without any other concerning findings aside from stranding and mild inflammation of the right ureter.  Right ureteral stent noted to have migrated distally slightly, though no concerns given no hydronephrosis.    Interval History:  Febrile to 102.5 overnight, currently afebrile with stable vital signs.  Tachycardia yesterday with improvement with fluids.  One of 2 blood cultures with Gram-negative rods.  Urine cultures with no growth.  Creatinine down to 1.2 after fluids.  White count downtrending to 13 this morning.  Good urine output of 1.6 L overnight.  Pain improving but still persistent.  Poor appetite.      Objective:     Temp:  [98.4 °F (36.9 °C)-102.5 °F (39.2 °C)] 99.7 °F (37.6 °C)  Pulse:  [] 89  Resp:  [18] 18  SpO2:  [92 %-97 %] 95 %  BP: (113-150)/(59-70) 149/70     Body mass index is 33.03 kg/m².      Bladder Scan Volume (mL): 183 mL (10/16/24 1157)    Drains       Drain  Duration             Female External Urinary Catheter w/ Suction 10/15/24 2 days                     Physical Exam  Vitals and nursing note reviewed.   HENT:      Head: Atraumatic.      Nose: Nose normal.   Eyes:       Extraocular Movements: Extraocular movements intact.   Cardiovascular:      Rate and Rhythm: Normal rate.   Pulmonary:      Effort: Pulmonary effort is normal.   Abdominal:      General: Abdomen is flat. There is no distension.      Tenderness: There is no abdominal tenderness. There is no right CVA tenderness or left CVA tenderness.   Musculoskeletal:         General: Normal range of motion.      Cervical back: Normal range of motion.   Skin:     Coloration: Skin is not jaundiced.   Neurological:      General: No focal deficit present.      Mental Status: She is alert and oriented to person, place, and time.   Psychiatric:         Mood and Affect: Mood normal.         Behavior: Behavior normal.           Significant Labs:    BMP:  Recent Labs   Lab 10/15/24  0351 10/16/24  0244 10/17/24  0316    138 138   K 3.2* 4.3 3.5    106 107   CO2 18* 19* 22*   BUN 6 12 15   CREATININE 1.2 1.5* 1.2   CALCIUM 10.0 8.8 8.3*       CBC:   Recent Labs   Lab 10/15/24  0351 10/16/24  0244 10/17/24  0316   WBC 16.62* 24.25* 13.54*   HGB 14.1 12.0 10.8*   HCT 41.7 36.8* 32.4*    202 157       All pertinent labs results from the past 24 hours have been reviewed.    Significant Imaging:  All pertinent imaging results/findings from the past 24 hours have been reviewed.                  Assessment/Plan:     Nephrolithiasis  - no acute interventions from Urology at this time  - ureteral stent removed 10/16/2024  - recommend broad-spectrum IV antibiotics, follow up all cultures and tailor antibiotics as needed.  On cefepime.  - recommend fluids given poor p.o. intake as well as persistent tachycardia  - all remaining care per primary team  - please reach out to Urology with questions or concerns  - follow up with Dr. Saenz as scheduled        VTE Risk Mitigation (From admission, onward)           Ordered     IP VTE HIGH RISK PATIENT  Once         10/15/24 0738     Place sequential compression device  Until discontinued          10/15/24 0738                    Bran Gil MD  Urology  [place] [facility]

## 2024-10-17 NOTE — ASSESSMENT & PLAN NOTE
- no acute interventions from Urology at this time  - ureteral stent removed 10/16/2024  - recommend broad-spectrum IV antibiotics, follow up all cultures and tailor antibiotics as needed.  On cefepime.  - recommend fluids given poor p.o. intake as well as persistent tachycardia  - all remaining care per primary team  - please reach out to Urology with questions or concerns  - follow up with Dr. Saenz as scheduled

## 2024-10-17 NOTE — SUBJECTIVE & OBJECTIVE
Interval History:  Awake, alert, sitting up in bed, tolerating physical therapy   Abdominal pain improved, appreciate urology recs planning for repeat imaging and possible stenting-patient not interested on replacing stent for now due to pain.    No fever overnight, WBC downtrending, blood culture with Gram-negative rods-PCR with Pseudomonas antibiotics changed to meropenem-continue pending final sensitivity,  repeat blood culture with NGTD      Review of Systems   Constitutional:  Positive for activity change, appetite change, fatigue and fever. Negative for chills.   Respiratory:  Negative for shortness of breath.    Gastrointestinal:  Positive for abdominal pain, nausea and vomiting.   Genitourinary:  Negative for difficulty urinating, dysuria, flank pain, pelvic pain and urgency.   Musculoskeletal:  Negative for arthralgias.   Skin:  Negative for pallor and rash.   Neurological:  Positive for weakness.   Psychiatric/Behavioral:  Negative for confusion.      Objective:     Vital Signs (Most Recent):  Temp: 97.9 °F (36.6 °C) (10/17/24 1137)  Pulse: 80 (10/17/24 1137)  Resp: 16 (10/17/24 1309)  BP: (!) 97/52 (10/17/24 1137)  SpO2: (!) 94 % (10/17/24 1137) Vital Signs (24h Range):  Temp:  [97.9 °F (36.6 °C)-102.5 °F (39.2 °C)] 97.9 °F (36.6 °C)  Pulse:  [] 80  Resp:  [16-18] 16  SpO2:  [92 %-97 %] 94 %  BP: ()/(52-70) 97/52     Weight: 100 kg (220 lb 7.4 oz)  Body mass index is 33.03 kg/m².    Intake/Output Summary (Last 24 hours) at 10/17/2024 1420  Last data filed at 10/17/2024 1354  Gross per 24 hour   Intake 3536 ml   Output 1950 ml   Net 1586 ml         Physical Exam  HENT:      Head: Normocephalic and atraumatic.   Pulmonary:      Effort: Pulmonary effort is normal.   Abdominal:      General: Bowel sounds are normal.      Palpations: Abdomen is soft.      Tenderness: There is abdominal tenderness in the right lower quadrant.   Musculoskeletal:      Cervical back: Normal range of motion.      Right  "lower leg: No edema.      Left lower leg: No edema.   Skin:     Findings: Lesion present.   Neurological:      Mental Status: She is alert.   Psychiatric:         Mood and Affect: Mood normal.             Significant Labs: A1C:   Recent Labs   Lab 06/08/24  0626   HGBA1C 5.3     ABGs:   No results for input(s): "PH", "PCO2", "HCO3", "POCSATURATED", "BE", "TOTALHB", "COHB", "METHB", "O2HB", "POCFIO2", "PO2" in the last 48 hours.    Blood Culture:   No results for input(s): "LABBLOO" in the last 48 hours.    CBC:   Recent Labs   Lab 10/16/24  0244 10/17/24  0316   WBC 24.25* 13.54*   HGB 12.0 10.8*   HCT 36.8* 32.4*    157     CMP:   Recent Labs   Lab 10/16/24  0244 10/17/24  0316    138   K 4.3 3.5    107   CO2 19* 22*   GLU 92 103   BUN 12 15   CREATININE 1.5* 1.2   CALCIUM 8.8 8.3*   PROT 5.9* 5.0*   ALBUMIN 2.7* 2.1*   BILITOT 0.2 0.2   ALKPHOS 73 73   AST 13 10   ALT 8* 8*   ANIONGAP 13 9     Lactic Acid:   No results for input(s): "LACTATE" in the last 48 hours.    Lipase: No results for input(s): "LIPASE" in the last 48 hours.  Lipid Panel: No results for input(s): "CHOL", "HDL", "LDLCALC", "TRIG", "CHOLHDL" in the last 48 hours.  Magnesium:   Recent Labs   Lab 10/16/24  0244 10/17/24  0316   MG 1.4* 1.8     Troponin: No results for input(s): "TROPONINI", "TROPONINIHS" in the last 48 hours.  TSH: No results for input(s): "TSH" in the last 4320 hours.  Urine Culture: No results for input(s): "LABURIN" in the last 48 hours.  Urine Studies:   No results for input(s): "COLORU", "APPEARANCEUA", "PHUR", "SPECGRAV", "PROTEINUA", "GLUCUA", "KETONESU", "BILIRUBINUA", "OCCULTUA", "NITRITE", "UROBILINOGEN", "LEUKOCYTESUR", "RBCUA", "WBCUA", "BACTERIA", "SQUAMEPITHEL", "HYALINECASTS" in the last 48 hours.    Invalid input(s): "WRIGHTSUR"      Significant Imaging: I have reviewed all pertinent imaging results/findings within the past 24 hours.  "

## 2024-10-17 NOTE — PLAN OF CARE
went to meet with patient. Patient is not medically ready to discharge yet. Therapy notes reviewed. Patient is agreeable to Home Health. Home Health referral sent via [facility].  to work on approval for rolling walker closer to discharge. Blood Cultures Pending. Patient will need a ride home at discharge. Patient encouraged to call with any questions or concerns.  will continue to follow patient through transitions of care and assist with any discharge needs.     [facility] is able to accept patient.     Other Contacts    Name Relation Home Work Mobile   Quin Tarango Daughter   [phone]     Future Appointments   Date Time Provider Department Center   11/14/2024 10:00 AM [facility]   11/21/2024  9:00 AM Humza Saenz MD [facility] UROLOGY [facility]                  Name Relationship Specialty Phone Fax Address Order                *[facility]  Home Health Services, Home Therapy Services, Home Living Aide Services [phone] [address] [place] [id number]     Next Steps: Follow up  Instructions: [employer]               10/17/24 0437   Discharge Reassessment   Assessment Type Discharge Planning Reassessment   Did the patient's condition or plan change since previous assessment? No   Discharge Plan discussed with: Patient   Discharge Plan A Home;Home Health   Discharge Plan B Home with family;Home Health   DME Needed Upon Discharge  walker, rolling   Transition of Care Barriers None   Why the patient remains in the hospital Requires continued medical care   Post-Acute Status   Post-Acute Authorization HME;Home Health   HME Status Pending therapy documentation   Home Health Status Referrals Sent   Hospital Resources/Appts/Education Provided Appointments scheduled and added to AVS   Discharge Delays None known at this time     Floresita Guillen RN  Case  Manager  [phone]

## 2024-10-17 NOTE — PLAN OF CARE
Problem: Physical Therapy  Goal: Physical Therapy Goal  Description: Goals to be met by: 24     Patient will increase functional independence with mobility by performin. Supine to sit with Stand-by Assistance  2. Sit to supine with Stand-by Assistance  3. Sit to stand transfer with Supervision  4. Bed to chair transfer with Supervision using Rolling Walker  5. Gait  x 150 feet with Supervision using Rolling Walker.   6. Ascend/descend 5 steps with 1-2 Handrails Contact Guard Assistance using No Assistive Device.     Outcome: Progressing     PT Eval completed, note to follow. Pt ambulated ~80 ft with RW and SBA. Pt reports decrease in function compared to baseline and need for use of RW. Recommending low intensity therapy (HH PT/OT) and rolling walker, TTB.    The mobility limitation cannot be sufficiently resolved by the use of a cane.   Patient's functional mobility deficit can be sufficiently resolved with the use of a rolling walker. Patient's mobility limitation significantly impairs their ability to participate in one of more activities of daily living. The use of a rolling walker will significantly improve the patient's ability to participate in MRADLS and the patient will use it on regular basis in the home.

## 2024-10-17 NOTE — PT/OT/SLP PROGRESS
Occupational Therapy   Treatment    Name: Josselyn Tarango  MRN: [id number]  Admitting Diagnosis:  Sepsis       Recommendations:     Discharge Recommendations: Low Intensity Therapy (HH)  Discharge Equipment Recommendations:  to be determined by next level of care (Defer ambulation aid to PT)  Barriers to discharge:  Other (Comment) (Pain)    Assessment:     Josselyn Tarango is a 59 y.o. female with a medical diagnosis of Sepsis. She presents with The primary encounter diagnosis was Sepsis, due to unspecified organism, unspecified whether acute organ dysfunction present. Diagnoses of SIRS (systemic inflammatory response syndrome), History of kidney stones, JUAN LUIS (acute kidney injury), and Nephrolithiasis were also pertinent to this visit.   Performance deficits affecting function are weakness, impaired endurance, pain, impaired self care skills, decreased ROM, decreased coordination, impaired functional mobility.       Rehab Prognosis:  Good; patient would benefit from acute skilled OT services to address these deficits and reach maximum level of function.       Plan:     Patient to be seen 3 x/week to address the above listed problems via self-care/home management, therapeutic activities, therapeutic exercises  Plan of Care Expires: 11/13/24  Plan of Care Reviewed with: patient    Subjective     Chief Complaint: Headache  Patient/Family Comments/goals: To return home  Pain/Comfort:  Pain Rating 1: 7/10  Location - Side 1: Right  Location 1: abdomen  Pain Addressed 1: Reposition, Distraction, Cessation of Activity, Nurse notified  Pain Rating Post-Intervention 1: 7/10  Pain Rating 2: 7/10  Location - Side 2: Bilateral  Location 2: head  Pain Addressed 2: Pre-medicate for activity, Distraction, Cessation of Activity, Nurse notified    Objective:     Communicated with: nurse prior to session.  Patient found HOB elevated with bed alarm, telemetry upon OT entry to room.    General Precautions: Standard, fall    Orthopedic  Precautions:N/A  Braces: N/A  Respiratory Status: Room air     Occupational Performance:     Bed Mobility:    Patient completed Rolling/Turning to Left with  independence  Patient completed Supine to Sit with independence  Patient completed Sit to Supine with independence     Functional Mobility/Transfers:  Patient completed Sit <> Stand Transfer with stand by assistance  with  no assistive device   Patient completed Toilet Transfer Step Transfer technique with stand by assistance with  no AD  Functional Mobility: In room mobility to/from true bathroom with no AD with CGA. Out of room mobility to nurses station x2 trials; first trial with no AD  with SBA;  second trial with rolling walker with reported decrease pain with SBA.    Activities of Daily Living:  Lower Body Dressing: stand by assistance partial dressing,socks and underwear with standing clothing retrieval in closet.   Toileting: stand by assistance on true toilet.       [facility] 6 Click ADL: 22    Treatment & Education:  Patient re-educated on role of OT.  Patient oriented x 4  SpO2 and heart rate monitored during session. In supine: 97% and 73bpm; seated after first walk 98% and 89bpm.   ADL / functional mobility  training as above. (Placing RLE in first when getting dressed; Pt mobilized from nurses station back to room with rolling walker for increased steadiness).   Utilized visual demo for dressing starting with right side first.  Educated on call light and tub transfer bench; handout provided.   100% reciprocation for call light.  Answered inquires in scope.        Patient left HOB elevated with all lines intact, call button in reach, bed alarm on, and nurse notified    GOALS:   Multidisciplinary Problems       Occupational Therapy Goals          Problem: Occupational Therapy    Goal Priority Disciplines Outcome Interventions   Occupational Therapy Goal     OT, PT/OT Progressing    Description: Goals to be met by: 11/13/2024     Patient will increase  functional independence with ADLs by performing:    UE Dressing with [place].  LE Dressing with [place].  Toileting from toilet with Modified [place] for hygiene and clothing management.   Toilet transfer to toilet with [place].  100% reciprocation of at least two non pharmacological pain management strategies.  Functional mobility x5 minutes with SBA with least restrictive device and no adverse response.                            Time Tracking:     OT Date of Treatment: 10/17/24  OT Start Time: 1408  OT Stop Time: 1437  OT Total Time (min): 29 min    Billable Minutes:Self Care/Home Management 15  Therapeutic Activity 14    OT/JUVENCIO: OT          10/17/2024

## 2024-10-17 NOTE — PT/OT/SLP EVAL
Physical Therapy Evaluation    Patient Name:  Josselyn Tarango   MRN:  [id number]    Recommendations:     Discharge Recommendations: Low Intensity Therapy (HH PT/OT)   Discharge Equipment Recommendations: walker, rolling, bath bench   Barriers to discharge:  pain, decreased function    Assessment:     Josselyn Tarango is a 59 y.o. female admitted with a medical diagnosis of Sepsis.  She presents with the following impairments/functional limitations: impaired balance, impaired endurance, impaired sensation, impaired functional mobility, gait instability, decreased lower extremity function, impaired self care skills, pain .Pt ambulated ~80 ft with RW and SBA. Pt reports decrease in function compared to baseline and need for use of RW. Recommending low intensity therapy (HH PT/OT) and rolling walker, TTB.    The mobility limitation cannot be sufficiently resolved by the use of a cane.   Patient's functional mobility deficit can be sufficiently resolved with the use of a rolling walker. Patient's mobility limitation significantly impairs their ability to participate in one of more activities of daily living. The use of a rolling walker will significantly improve the patient's ability to participate in MRADLS and the patient will use it on regular basis in the home    Rehab Prognosis: Good; patient would benefit from acute skilled PT services to address these deficits and reach maximum level of function.    Recent Surgery: * No surgery found *      Plan:     During this hospitalization, patient to be seen 3 x/week to address the identified rehab impairments via gait training, therapeutic activities, therapeutic exercises, neuromuscular re-education and progress toward the following goals:    Plan of Care Expires:  11/17/24    Subjective     Chief Complaint: lower abdominal pain  Patient/Family Comments/goals: to return to PLOF  Pain/Comfort:  Pain Rating 1: 8/10  Location - Side 1: Right  Location - Orientation 1:  lower  Location 1: abdomen  Pain Addressed 1: Reposition, Distraction, Cessation of Activity, Nurse notified  Pain Rating Post-Intervention 1: 8/10    Patients cultural, spiritual, Anabaptist conflicts given the current situation: no    Living Environment:  Pt lives with roommate in a  with 5 BOO and 1-2 rails with T/S  Prior to admission, patients level of function was Independent without AD, no falls, and does not drive.  Equipment used at home: none.  DME owned (not currently used): none.  Upon discharge, patient will have assistance from roommate.    Objective:     Communicated with nsg prior to session.  Patient found up in chair with peripheral IV, telemetry  upon PT entry to room.    General Precautions: Standard, fall  Orthopedic Precautions:N/A   Braces: N/A  Respiratory Status: Room air    Exams:  Cognitive Exam:  Patient is oriented to Person, Place, Time, and Situation  Postural Exam:  Patient presented with the following abnormalities:    -       Rounded shoulders  Sensation: subjective reports of chronic numbness/tingling in B hands and feet  RLE ROM: WFL  RLE Strength: WFL  LLE ROM: WFL  LLE Strength: WFL    Functional Mobility:  Transfers:     Sit to Stand:  stand by assistance with rolling walker  Gait:   Pt ambulated ~80 ft with RW and SBA. Pt unsteady without use of AD and depending on RW for support, mild forward flexed posture      AM-PAC 6 CLICK MOBILITY  Total Score:17       Treatment & Education:  Pt educated on role of PT/POC and pt agreeable to participate  Pt performed mobility as above  Discussed d/c recs and DME needs    Patient left up in chair with all lines intact, call button in reach, and nsg notified.    GOALS:   Multidisciplinary Problems       Physical Therapy Goals          Problem: Physical Therapy    Goal Priority Disciplines Outcome Interventions   Physical Therapy Goal     PT, PT/OT Progressing    Description: Goals to be met by: 11/17/24     Patient will increase  functional independence with mobility by performin. Supine to sit with Stand-by Assistance  2. Sit to supine with Stand-by Assistance  3. Sit to stand transfer with Supervision  4. Bed to chair transfer with Supervision using Rolling Walker  5. Gait  x 150 feet with Supervision using Rolling Walker.   6. Ascend/descend 5 steps with 1-2 Handrails Contact Guard Assistance using No Assistive Device.                          History:     Past Medical History:   Diagnosis Date    Diabetes mellitus     diet control    Digestive disorder     Hx: gastric ulcer       Past Surgical History:   Procedure Laterality Date    CYSTOSCOPY W/ URETERAL STENT PLACEMENT Left 2024    Procedure: CYSTOSCOPY, left retrograde pyelogram, left JJ stent;  Surgeon: Humza Saenz MD;  Location: [facility] OR;  Service: Urology;  Laterality: Left;  MAC vs choice    CYSTOURETEROSCOPY, WITH HOLMIUM LASER LITHOTRIPSY OF URETERAL CALCULUS AND STENT INSERTION Left 2024    Procedure: Cystoscopy, left retrograde pyelogram, left ureteroscopy with holmium laser lithotripsy, stone basket extraction, left JJ stent;  Surgeon: Humza Saenz MD;  Location: [facility] OR;  Service: Urology;  Laterality: Left;  Element    CYSTOURETEROSCOPY, WITH HOLMIUM LASER LITHOTRIPSY OF URETERAL CALCULUS AND STENT INSERTION Right 10/14/2024    Procedure: CYSTOURETEROSCOPY, WITH HOLMIUM LASER LITHOTRIPSY OF URETERAL CALCULUS AND STENT INSERTION;  Surgeon: Humza Saenz MD;  Location: [facility] OR;  Service: Urology;  Laterality: Right;  LMA    EXTRACTION - STONE Left 2024    Procedure: EXTRACTION - STONE;  Surgeon: Humza Saenz MD;  Location: [facility] OR;  Service: Urology;  Laterality: Left;    RETROGRADE PYELOGRAPHY N/A 2024    Procedure: PYELOGRAM, RETROGRADE;  Surgeon: Humza Saenz MD;  Location: [facility] OR;  Service: Urology;  Laterality: N/A;    RETROGRADE PYELOGRAPHY N/A 2024    Procedure: PYELOGRAM, RETROGRADE;  Surgeon:  Humza Saenz MD;  Location: [facility];  Service: Urology;  Laterality: N/A;    RETROGRADE PYELOGRAPHY  10/14/2024    Procedure: PYELOGRAM, RETROGRADE;  Surgeon: Humza Saenz MD;  Location: [facility];  Service: Urology;;       Time Tracking:     PT Received On: 10/17/24  PT Start Time: 1049     PT Stop Time: 1117  PT Total Time (min): 28 min     Billable Minutes: Evaluation 10 and Gait Training 18      10/17/2024

## 2024-10-17 NOTE — SUBJECTIVE & OBJECTIVE
Interval History:  Febrile to 102.5 overnight, currently afebrile with stable vital signs.  Tachycardia yesterday with improvement with fluids.  One of 2 blood cultures with Gram-negative rods.  Urine cultures with no growth.  Creatinine down to 1.2 after fluids.  White count downtrending to 13 this morning.  Good urine output of 1.6 L overnight.  Pain improving but still persistent.  Poor appetite.      Objective:     Temp:  [98.4 °F (36.9 °C)-102.5 °F (39.2 °C)] 99.7 °F (37.6 °C)  Pulse:  [] 89  Resp:  [18] 18  SpO2:  [92 %-97 %] 95 %  BP: (113-150)/(59-70) 149/70     Body mass index is 33.03 kg/m².      Bladder Scan Volume (mL): 183 mL (10/16/24 1157)    Drains       Drain  Duration             Female External Urinary Catheter w/ Suction 10/15/24 2 days                     Physical Exam  Vitals and nursing note reviewed.   HENT:      Head: Atraumatic.      Nose: Nose normal.   Eyes:      Extraocular Movements: Extraocular movements intact.   Cardiovascular:      Rate and Rhythm: Normal rate.   Pulmonary:      Effort: Pulmonary effort is normal.   Abdominal:      General: Abdomen is flat. There is no distension.      Tenderness: There is no abdominal tenderness. There is no right CVA tenderness or left CVA tenderness.   Musculoskeletal:         General: Normal range of motion.      Cervical back: Normal range of motion.   Skin:     Coloration: Skin is not jaundiced.   Neurological:      General: No focal deficit present.      Mental Status: She is alert and oriented to person, place, and time.   Psychiatric:         Mood and Affect: Mood normal.         Behavior: Behavior normal.           Significant Labs:    BMP:  Recent Labs   Lab 10/15/24  0351 10/16/24  0244 10/17/24  0316    138 138   K 3.2* 4.3 3.5    106 107   CO2 18* 19* 22*   BUN 6 12 15   CREATININE 1.2 1.5* 1.2   CALCIUM 10.0 8.8 8.3*       CBC:   Recent Labs   Lab 10/15/24  0351 10/16/24  0244 10/17/24  0316   WBC 16.62* 24.25*  13.54*   HGB 14.1 12.0 10.8*   HCT 41.7 36.8* 32.4*    202 157       All pertinent labs results from the past 24 hours have been reviewed.    Significant Imaging:  All pertinent imaging results/findings from the past 24 hours have been reviewed.

## 2024-10-17 NOTE — PROGRESS NOTES
[facility]  Progress Note    Patient Name: Josselyn Tarango  MRN: [id number]  Patient Class: IP- Inpatient   Admission Date: 10/15/2024  Length of Stay: 2 days  Attending Physician: Madelin Hunt*  Primary Care Provider: Renetta, Primary Doctor        Subjective:     Principal Problem:Sepsis        HPI:  Josselyn Tarango is a 60 y/o F with PMH of prediabetes, diet controlled, nicotine dependence, ureteral lithiasis, s/p ureteral stent placement on 10/14, presents with 1 day history of generalized malaise, with associated chills with rigors, fever, nausea, vomiting, decreased appetite and abdominal pain.  Patient stated symptoms started few hours after her procedure-right ureteral stent placement.  She had received perioperative IV antibiotics, and continued with her p.o. antibiotics at home.  WBC 16.2, H/H 14/41, platelets 246, potassium 3.2, BUN/creatinine 1.2/10, LFT within normal limit chest x-ray unremarkable, CT renal stone study reports no stone bed nonspecific right-sided perinephric inflammatory changes.  Mild urothelial thickening in the visualized ureter of the right.  Patient is started on sepsis protocol, IV fluid and IV antibiotics.  Admit hospital medicine service    Overview/Hospital Course:  No notes on file    Interval History:  Awake, alert, sitting up in bed, tolerating physical therapy   Abdominal pain improved, appreciate urology recs planning for repeat imaging and possible stenting-patient not interested on replacing stent for now due to pain.    No fever overnight, WBC downtrending, blood culture with Gram-negative rods-PCR with Pseudomonas antibiotics changed to meropenem-continue pending final sensitivity,  repeat blood culture with NGTD      Review of Systems   Constitutional:  Positive for activity change, appetite change, fatigue and fever. Negative for chills.   Respiratory:  Negative for shortness of breath.    Gastrointestinal:  Positive for abdominal  "pain, nausea and vomiting.   Genitourinary:  Negative for difficulty urinating, dysuria, flank pain, pelvic pain and urgency.   Musculoskeletal:  Negative for arthralgias.   Skin:  Negative for pallor and rash.   Neurological:  Positive for weakness.   Psychiatric/Behavioral:  Negative for confusion.      Objective:     Vital Signs (Most Recent):  Temp: 97.9 °F (36.6 °C) (10/17/24 1137)  Pulse: 80 (10/17/24 1137)  Resp: 16 (10/17/24 1309)  BP: (!) 97/52 (10/17/24 1137)  SpO2: (!) 94 % (10/17/24 1137) Vital Signs (24h Range):  Temp:  [97.9 °F (36.6 °C)-102.5 °F (39.2 °C)] 97.9 °F (36.6 °C)  Pulse:  [] 80  Resp:  [16-18] 16  SpO2:  [92 %-97 %] 94 %  BP: ()/(52-70) 97/52     Weight: 100 kg (220 lb 7.4 oz)  Body mass index is 33.03 kg/m².    Intake/Output Summary (Last 24 hours) at 10/17/2024 1420  Last data filed at 10/17/2024 1354  Gross per 24 hour   Intake 3536 ml   Output 1950 ml   Net 1586 ml         Physical Exam  HENT:      Head: Normocephalic and atraumatic.   Pulmonary:      Effort: Pulmonary effort is normal.   Abdominal:      General: Bowel sounds are normal.      Palpations: Abdomen is soft.      Tenderness: There is abdominal tenderness in the right lower quadrant.   Musculoskeletal:      Cervical back: Normal range of motion.      Right lower leg: No edema.      Left lower leg: No edema.   Skin:     Findings: Lesion present.   Neurological:      Mental Status: She is alert.   Psychiatric:         Mood and Affect: Mood normal.             Significant Labs: A1C:   Recent Labs   Lab 06/08/24  0626   HGBA1C 5.3     ABGs:   No results for input(s): "PH", "PCO2", "HCO3", "POCSATURATED", "BE", "TOTALHB", "COHB", "METHB", "O2HB", "POCFIO2", "PO2" in the last 48 hours.    Blood Culture:   No results for input(s): "LABBLOO" in the last 48 hours.    CBC:   Recent Labs   Lab 10/16/24  0244 10/17/24  0316   WBC 24.25* 13.54*   HGB 12.0 10.8*   HCT 36.8* 32.4*    157     CMP:   Recent Labs   Lab " "10/16/24  0244 10/17/24  0316    138   K 4.3 3.5    107   CO2 19* 22*   GLU 92 103   BUN 12 15   CREATININE 1.5* 1.2   CALCIUM 8.8 8.3*   PROT 5.9* 5.0*   ALBUMIN 2.7* 2.1*   BILITOT 0.2 0.2   ALKPHOS 73 73   AST 13 10   ALT 8* 8*   ANIONGAP 13 9     Lactic Acid:   No results for input(s): "LACTATE" in the last 48 hours.    Lipase: No results for input(s): "LIPASE" in the last 48 hours.  Lipid Panel: No results for input(s): "CHOL", "HDL", "LDLCALC", "TRIG", "CHOLHDL" in the last 48 hours.  Magnesium:   Recent Labs   Lab 10/16/24  0244 10/17/24  0316   MG 1.4* 1.8     Troponin: No results for input(s): "TROPONINI", "TROPONINIHS" in the last 48 hours.  TSH: No results for input(s): "TSH" in the last 4320 hours.  Urine Culture: No results for input(s): "LABURIN" in the last 48 hours.  Urine Studies:   No results for input(s): "COLORU", "APPEARANCEUA", "PHUR", "SPECGRAV", "PROTEINUA", "GLUCUA", "KETONESU", "BILIRUBINUA", "OCCULTUA", "NITRITE", "UROBILINOGEN", "LEUKOCYTESUR", "RBCUA", "WBCUA", "BACTERIA", "SQUAMEPITHEL", "HYALINECASTS" in the last 48 hours.    Invalid input(s): "WRIGHTSUR"      Significant Imaging: I have reviewed all pertinent imaging results/findings within the past 24 hours.    Assessment/Plan:      * Sepsis  Bacteremia   This patient does have evidence of infective focus  My overall impression is sepsis.  Source: Urinary Tract  Antibiotics given-   Antibiotics (72h ago, onward)      Start     Stop Route Frequency Ordered    10/16/24 1300  ceFEPIme injection 2 g         -- IV Every 12 hours (non-standard times) 10/16/24 1228          Latest lactate reviewed-  Recent Labs   Lab 10/15/24  0350 10/15/24  1120   LACTATE  --  2.9*   POCLAC 2.3*  --        Organ dysfunction indicated by  none    Fluid challenge Ideal Body Weight- The patient's ideal body weight is Ideal body weight: 65 kg (143 lb 6.5 oz) which will be used to calculate fluid bolus of 30 ml/kg for treatment of septic shock.  "     Post- resuscitation assessment Yes Perfusion exam was performed within 6 hours of septic shock presentation after bolus shows Adequate tissue perfusion assessed by invasive monitoring       Will Not start Pressors- Levophed for MAP of 65  Source control achieved by: IV abx        CT abdomen renal protocol  1. On the right, the patient is status post placement of a ureteral stent. Previously seen distal right renal calculus on the 10/10/2024 CT is not definitely appreciated.  Curvilinear calcification in involving right midpole calyx appears similar to prior.  Smaller adjoining punctate calcification/stone on the prior study is not definitely seen on the current study.  Scratch nonspecific right-sided perinephric inflammatory change. Mild urothelial thickening in the visualized upper ureter on the right.  Findings could relate to sequela of recent instrumentation, noting that infectious process would not be excluded in the appropriate clinical context.  2. Indeterminate hypodense lesion right hepatic lobe similar to 10/10/2024 and 07/13/2024 CT.  Further characterization dedicated hepatic mass protocol MRI or CT would be recommended.  3. Details of additional chronic and incidental findings, as provided in the body of repor    Blood culture with Gram-negative rods-Pseudomonas, vanc/ceftriaxone switch to cefepime, repeat blood culture on 10/16 with NGTD    JUAN LUIS (acute kidney injury)  JUAN LUIS is likely due to pre-renal azotemia due to intravascular volume depletion. Baseline creatinine is  1,0 . Most recent creatinine and eGFR are listed below.  Recent Labs     10/13/24  2240 10/15/24  0351 10/16/24  0244   CREATININE 1.0 1.2 1.5*   EGFRNORACEVR >60 52* 40*      Plan  - JUAN LUIS is improving  - Avoid nephrotoxins and renally dose meds for GFR listed above  - Monitor urine output, serial BMP, and adjust therapy as needed  -     Hypokalemia  Patient's most recent potassium results are listed below.   Recent Labs      10/13/24  2240 10/15/24  0351   K 3.4* 3.2*     Plan  - Replete potassium per protocol  - Monitor potassium Daily  - Patient's hypokalemia is improving  -     Pre-diabetes  HbA1c 5.3 four months ago  Continue diet controlled      Dysphagia  Consult SLP- appreciate recs      Dependence on nicotine from cigarettes  Dangers of cigarette smoking were reviewed with patient in detail. Patient was Counseled for 3-10 minutes. Nicotine replacement options were discussed. Nicotine replacement was discussed- prescribed    Nephrolithiasis  History of renal stone s/p ureteral stent placement for ureterolithiasis        VTE Risk Mitigation (From admission, onward)           Ordered     IP VTE HIGH RISK PATIENT  Once         10/15/24 0738     Place sequential compression device  Until discontinued         10/15/24 0738                    Discharge Planning   PIERO:      Code Status: Full Code   Is the patient medically ready for discharge?:     Reason for patient still in hospital (select all that apply): Patient trending condition  Discharge Plan A: Home            Madelin Hunt MD  Department of Hospital Medicine   [place] - Telemetry

## 2024-10-17 NOTE — ASSESSMENT & PLAN NOTE
Bacteremia   This patient does have evidence of infective focus  My overall impression is sepsis.  Source: Urinary Tract  Antibiotics given-   Antibiotics (72h ago, onward)      Start     Stop Route Frequency Ordered    10/16/24 1300  ceFEPIme injection 2 g         -- IV Every 12 hours (non-standard times) 10/16/24 1228          Latest lactate reviewed-  Recent Labs   Lab 10/15/24  0350 10/15/24  1120   LACTATE  --  2.9*   POCLAC 2.3*  --        Organ dysfunction indicated by  none    Fluid challenge Ideal Body Weight- The patient's ideal body weight is Ideal body weight: 65 kg (143 lb 6.5 oz) which will be used to calculate fluid bolus of 30 ml/kg for treatment of septic shock.      Post- resuscitation assessment Yes Perfusion exam was performed within 6 hours of septic shock presentation after bolus shows Adequate tissue perfusion assessed by invasive monitoring       Will Not start Pressors- Levophed for MAP of 65  Source control achieved by: IV abx        CT abdomen renal protocol  1. On the right, the patient is status post placement of a ureteral stent. Previously seen distal right renal calculus on the 10/10/2024 CT is not definitely appreciated.  Curvilinear calcification in involving right midpole calyx appears similar to prior.  Smaller adjoining punctate calcification/stone on the prior study is not definitely seen on the current study.  Scratch nonspecific right-sided perinephric inflammatory change. Mild urothelial thickening in the visualized upper ureter on the right.  Findings could relate to sequela of recent instrumentation, noting that infectious process would not be excluded in the appropriate clinical context.  2. Indeterminate hypodense lesion right hepatic lobe similar to 10/10/2024 and 07/13/2024 CT.  Further characterization dedicated hepatic mass protocol MRI or CT would be recommended.  3. Details of additional chronic and incidental findings, as provided in the body of repor    Blood  culture with Gram-negative rods-Pseudomonas, vanc/ceftriaxone switch to cefepime, repeat blood culture on 10/16 with NGTD

## 2024-10-18 VITALS
TEMPERATURE: 98 F | WEIGHT: 220.44 LBS | OXYGEN SATURATION: 96 % | HEIGHT: 69 IN | HEART RATE: 65 BPM | RESPIRATION RATE: 17 BRPM | DIASTOLIC BLOOD PRESSURE: 55 MMHG | SYSTOLIC BLOOD PRESSURE: 125 MMHG | BODY MASS INDEX: 32.65 KG/M2

## 2024-10-18 LAB
ALBUMIN SERPL BCP-MCNC: 2.2 G/DL (ref 3.5–5.2)
ALP SERPL-CCNC: 77 U/L (ref 40–150)
ALT SERPL W/O P-5'-P-CCNC: 8 U/L (ref 10–44)
ANION GAP SERPL CALC-SCNC: 6 MMOL/L (ref 8–16)
AST SERPL-CCNC: 10 U/L (ref 10–40)
BACTERIA BLD CULT: ABNORMAL
BASOPHILS # BLD AUTO: 0.03 K/UL (ref 0–0.2)
BASOPHILS NFR BLD: 0.4 % (ref 0–1.9)
BILIRUB SERPL-MCNC: 0.3 MG/DL (ref 0.1–1)
BUN SERPL-MCNC: 15 MG/DL (ref 6–20)
CALCIUM SERPL-MCNC: 8.6 MG/DL (ref 8.7–10.5)
CHLORIDE SERPL-SCNC: 105 MMOL/L (ref 95–110)
CO2 SERPL-SCNC: 27 MMOL/L (ref 23–29)
CREAT SERPL-MCNC: 1.1 MG/DL (ref 0.5–1.4)
DIFFERENTIAL METHOD BLD: ABNORMAL
EOSINOPHIL # BLD AUTO: 0.3 K/UL (ref 0–0.5)
EOSINOPHIL NFR BLD: 4.2 % (ref 0–8)
ERYTHROCYTE [DISTWIDTH] IN BLOOD BY AUTOMATED COUNT: 17.2 % (ref 11.5–14.5)
EST. GFR  (NO RACE VARIABLE): 58 ML/MIN/1.73 M^2
GLUCOSE SERPL-MCNC: 89 MG/DL (ref 70–110)
HCT VFR BLD AUTO: 31.7 % (ref 37–48.5)
HGB BLD-MCNC: 10.4 G/DL (ref 12–16)
IMM GRANULOCYTES # BLD AUTO: 0.02 K/UL (ref 0–0.04)
IMM GRANULOCYTES NFR BLD AUTO: 0.3 % (ref 0–0.5)
LYMPHOCYTES # BLD AUTO: 1.4 K/UL (ref 1–4.8)
LYMPHOCYTES NFR BLD: 19.7 % (ref 18–48)
MAGNESIUM SERPL-MCNC: 2 MG/DL (ref 1.6–2.6)
MCH RBC QN AUTO: 29.3 PG (ref 27–31)
MCHC RBC AUTO-ENTMCNC: 32.8 G/DL (ref 32–36)
MCV RBC AUTO: 89 FL (ref 82–98)
MONOCYTES # BLD AUTO: 0.4 K/UL (ref 0.3–1)
MONOCYTES NFR BLD: 6.3 % (ref 4–15)
NEUTROPHILS # BLD AUTO: 4.8 K/UL (ref 1.8–7.7)
NEUTROPHILS NFR BLD: 69.1 % (ref 38–73)
NRBC BLD-RTO: 0 /100 WBC
PHOSPHATE SERPL-MCNC: 2.3 MG/DL (ref 2.7–4.5)
PLATELET # BLD AUTO: 132 K/UL (ref 150–450)
PMV BLD AUTO: 11.1 FL (ref 9.2–12.9)
POTASSIUM SERPL-SCNC: 4.2 MMOL/L (ref 3.5–5.1)
PROT SERPL-MCNC: 5.4 G/DL (ref 6–8.4)
RBC # BLD AUTO: 3.55 M/UL (ref 4–5.4)
SODIUM SERPL-SCNC: 138 MMOL/L (ref 136–145)
WBC # BLD AUTO: 6.96 K/UL (ref 3.9–12.7)

## 2024-10-18 PROCEDURE — 99232 SBSQ HOSP IP/OBS MODERATE 35: CPT | Mod: ,,, | Performed by: UROLOGY

## 2024-10-18 PROCEDURE — 83735 ASSAY OF MAGNESIUM: CPT | Performed by: FAMILY MEDICINE

## 2024-10-18 PROCEDURE — 84100 ASSAY OF PHOSPHORUS: CPT | Performed by: FAMILY MEDICINE

## 2024-10-18 PROCEDURE — 36415 COLL VENOUS BLD VENIPUNCTURE: CPT | Performed by: FAMILY MEDICINE

## 2024-10-18 PROCEDURE — 97116 GAIT TRAINING THERAPY: CPT | Mod: CQ

## 2024-10-18 PROCEDURE — 25000003 PHARM REV CODE 250

## 2024-10-18 PROCEDURE — 80053 COMPREHEN METABOLIC PANEL: CPT | Performed by: FAMILY MEDICINE

## 2024-10-18 PROCEDURE — 97530 THERAPEUTIC ACTIVITIES: CPT | Mod: CQ

## 2024-10-18 PROCEDURE — 85025 COMPLETE CBC W/AUTO DIFF WBC: CPT | Performed by: FAMILY MEDICINE

## 2024-10-18 PROCEDURE — 97530 THERAPEUTIC ACTIVITIES: CPT

## 2024-10-18 PROCEDURE — S4991 NICOTINE PATCH NONLEGEND: HCPCS | Performed by: FAMILY MEDICINE

## 2024-10-18 PROCEDURE — 63600175 PHARM REV CODE 636 W HCPCS: Performed by: FAMILY MEDICINE

## 2024-10-18 PROCEDURE — 97535 SELF CARE MNGMENT TRAINING: CPT

## 2024-10-18 PROCEDURE — 25000003 PHARM REV CODE 250: Performed by: FAMILY MEDICINE

## 2024-10-18 RX ORDER — OXYBUTYNIN CHLORIDE 5 MG/1
5 TABLET ORAL 3 TIMES DAILY
Qty: 21 TABLET | Refills: 0 | Status: SHIPPED | OUTPATIENT
Start: 2024-10-18 | End: 2024-10-28

## 2024-10-18 RX ORDER — IBUPROFEN 600 MG/1
600 TABLET ORAL EVERY 6 HOURS PRN
Qty: 30 TABLET | Refills: 0 | Status: SHIPPED | OUTPATIENT
Start: 2024-10-18

## 2024-10-18 RX ORDER — PANTOPRAZOLE SODIUM 40 MG/1
40 TABLET, DELAYED RELEASE ORAL DAILY
Qty: 30 TABLET | Refills: 0 | Status: SHIPPED | OUTPATIENT
Start: 2024-10-19 | End: 2025-10-19

## 2024-10-18 RX ORDER — TAMSULOSIN HYDROCHLORIDE 0.4 MG/1
0.4 CAPSULE ORAL NIGHTLY
Qty: 30 CAPSULE | Refills: 0 | Status: SHIPPED | OUTPATIENT
Start: 2024-10-18 | End: 2024-11-20

## 2024-10-18 RX ORDER — OXYCODONE AND ACETAMINOPHEN 7.5; 325 MG/1; MG/1
1 TABLET ORAL EVERY 6 HOURS PRN
Qty: 12 TABLET | Refills: 0 | Status: SHIPPED | OUTPATIENT
Start: 2024-10-18

## 2024-10-18 RX ORDER — DEXTROMETHORPHAN/PSEUDOEPHED 2.5-7.5/.8
40 DROPS ORAL 4 TIMES DAILY PRN
Qty: 30 ML | Refills: 0 | Status: SHIPPED | OUTPATIENT
Start: 2024-10-18

## 2024-10-18 RX ORDER — CIPROFLOXACIN 750 MG/1
750 TABLET, FILM COATED ORAL EVERY 12 HOURS
Qty: 22 TABLET | Refills: 0 | Status: SHIPPED | OUTPATIENT
Start: 2024-10-18 | End: 2024-10-29

## 2024-10-18 RX ORDER — IBUPROFEN 200 MG
1 TABLET ORAL DAILY
Qty: 28 PATCH | Refills: 0 | Status: SHIPPED | OUTPATIENT
Start: 2024-10-18

## 2024-10-18 RX ADMIN — CEFEPIME 2 G: 2 INJECTION, POWDER, FOR SOLUTION INTRAVENOUS at 03:10

## 2024-10-18 RX ADMIN — OXYBUTYNIN CHLORIDE 5 MG: 5 TABLET ORAL at 03:10

## 2024-10-18 RX ADMIN — OXYCODONE HYDROCHLORIDE AND ACETAMINOPHEN 2 TABLET: 5; 325 TABLET ORAL at 11:10

## 2024-10-18 RX ADMIN — TAMSULOSIN HYDROCHLORIDE 0.4 MG: 0.4 CAPSULE ORAL at 08:10

## 2024-10-18 RX ADMIN — SUCRALFATE 1 G: 1 SUSPENSION ORAL at 11:10

## 2024-10-18 RX ADMIN — NICOTINE 1 PATCH: 21 PATCH, EXTENDED RELEASE TRANSDERMAL at 08:10

## 2024-10-18 RX ADMIN — OXYCODONE HYDROCHLORIDE AND ACETAMINOPHEN 2 TABLET: 5; 325 TABLET ORAL at 12:10

## 2024-10-18 RX ADMIN — Medication 600 ML: at 11:10

## 2024-10-18 RX ADMIN — OXYBUTYNIN CHLORIDE 5 MG: 5 TABLET ORAL at 08:10

## 2024-10-18 RX ADMIN — Medication 600 ML: at 04:10

## 2024-10-18 RX ADMIN — SIMETHICONE 40 MG: 20 SUSPENSION/ DROPS ORAL at 08:10

## 2024-10-18 RX ADMIN — ALUMINUM HYDROXIDE, MAGNESIUM HYDROXIDE, AND SIMETHICONE 30 ML: 200; 200; 20 SUSPENSION ORAL at 11:10

## 2024-10-18 RX ADMIN — SODIUM CHLORIDE: 9 INJECTION, SOLUTION INTRAVENOUS at 12:10

## 2024-10-18 RX ADMIN — POLYETHYLENE GLYCOL 3350 17 G: 17 POWDER, FOR SOLUTION ORAL at 08:10

## 2024-10-18 RX ADMIN — ALUMINUM HYDROXIDE, MAGNESIUM HYDROXIDE, AND SIMETHICONE 30 ML: 200; 200; 20 SUSPENSION ORAL at 03:10

## 2024-10-18 RX ADMIN — PANTOPRAZOLE SODIUM 40 MG: 40 TABLET, DELAYED RELEASE ORAL at 08:10

## 2024-10-18 RX ADMIN — SUCRALFATE 1 G: 1 SUSPENSION ORAL at 06:10

## 2024-10-18 RX ADMIN — ALUMINUM HYDROXIDE, MAGNESIUM HYDROXIDE, AND SIMETHICONE 30 ML: 200; 200; 20 SUSPENSION ORAL at 06:10

## 2024-10-18 RX ADMIN — Medication 600 ML: at 08:10

## 2024-10-18 NOTE — SUBJECTIVE & OBJECTIVE
Interval History:  Awake, alert, no new complaint.   JUAN LUIS-downtrending  WBC downtrending, blood culture with Pseudomonas antibiotics changed to meropenem-discharge on Cipro      Review of Systems   Constitutional:  Positive for activity change, appetite change and fatigue. Negative for chills and fever.   Respiratory:  Negative for shortness of breath.    Gastrointestinal:  Positive for abdominal pain. Negative for nausea and vomiting.   Genitourinary:  Negative for difficulty urinating, dysuria, flank pain, pelvic pain and urgency.   Musculoskeletal:  Negative for arthralgias.   Skin:  Negative for pallor and rash.   Neurological:  Positive for weakness.   Psychiatric/Behavioral:  Negative for confusion.      Objective:     Vital Signs (Most Recent):  Temp: 98.1 °F (36.7 °C) (10/18/24 0453)  Pulse: 69 (10/18/24 0453)  Resp: 18 (10/18/24 0453)  BP: (!) 118/59 (10/18/24 0453)  SpO2: 95 % (10/18/24 0453) Vital Signs (24h Range):  Temp:  [97.7 °F (36.5 °C)-101.2 °F (38.4 °C)] 98.1 °F (36.7 °C)  Pulse:  [60-89] 69  Resp:  [16-18] 18  SpO2:  [94 %-97 %] 95 %  BP: ()/(52-70) 118/59     Weight: 100 kg (220 lb 7.4 oz)  Body mass index is 33.03 kg/m².    Intake/Output Summary (Last 24 hours) at 10/18/2024 0742  Last data filed at 10/18/2024 0347  Gross per 24 hour   Intake 1420 ml   Output 2200 ml   Net -780 ml         Physical Exam  HENT:      Head: Normocephalic and atraumatic.   Cardiovascular:      Rate and Rhythm: Normal rate.   Pulmonary:      Effort: Pulmonary effort is normal.   Abdominal:      General: Bowel sounds are normal.      Palpations: Abdomen is soft.      Tenderness: There is abdominal tenderness in the right lower quadrant.   Musculoskeletal:      Cervical back: Normal range of motion.      Right lower leg: No edema.      Left lower leg: No edema.   Skin:     Findings: Lesion present.   Neurological:      Mental Status: She is alert.   Psychiatric:         Mood and Affect: Mood normal.  "          Significant Labs: A1C:   Recent Labs   Lab 06/08/24  0626   HGBA1C 5.3     ABGs:   No results for input(s): "PH", "PCO2", "HCO3", "POCSATURATED", "BE", "TOTALHB", "COHB", "METHB", "O2HB", "POCFIO2", "PO2" in the last 48 hours.    Blood Culture:   No results for input(s): "LABBLOO" in the last 48 hours.    CBC:   Recent Labs   Lab 10/17/24  0316 10/18/24  0410   WBC 13.54* 6.96   HGB 10.8* 10.4*   HCT 32.4* 31.7*    132*     CMP:   Recent Labs   Lab 10/17/24  0316 10/18/24  0410    138   K 3.5 4.2    105   CO2 22* 27    89   BUN 15 15   CREATININE 1.2 1.1   CALCIUM 8.3* 8.6*   PROT 5.0* 5.4*   ALBUMIN 2.1* 2.2*   BILITOT 0.2 0.3   ALKPHOS 73 77   AST 10 10   ALT 8* 8*   ANIONGAP 9 6*     Lactic Acid:   No results for input(s): "LACTATE" in the last 48 hours.    Lipase: No results for input(s): "LIPASE" in the last 48 hours.  Lipid Panel: No results for input(s): "CHOL", "HDL", "LDLCALC", "TRIG", "CHOLHDL" in the last 48 hours.  Magnesium:   Recent Labs   Lab 10/17/24  0316 10/18/24  0410   MG 1.8 2.0     Troponin: No results for input(s): "TROPONINI", "TROPONINIHS" in the last 48 hours.  TSH: No results for input(s): "TSH" in the last 4320 hours.  Urine Culture: No results for input(s): "LABURIN" in the last 48 hours.  Urine Studies:   No results for input(s): "COLORU", "APPEARANCEUA", "PHUR", "SPECGRAV", "PROTEINUA", "GLUCUA", "KETONESU", "BILIRUBINUA", "OCCULTUA", "NITRITE", "UROBILINOGEN", "LEUKOCYTESUR", "RBCUA", "WBCUA", "BACTERIA", "SQUAMEPITHEL", "HYALINECASTS" in the last 48 hours.    Invalid input(s): "QIAN"      Significant Imaging: I have reviewed all pertinent imaging results/findings within the past 24 hours.  "

## 2024-10-18 NOTE — PLAN OF CARE
Discharge orders noted. Carolyn with [facility] approved rolling walker. Rolling Walker to be delivered to patient. Urology follow-up previously scheduled. NEW PCP and [facility] appointment (to establish care) scheduled. [facility] has accepted patient. Home Health orders are in. Will update them. She will also need a ride home at discharge.      went to meet with patient. I updated her on discharge plans. Rolling walker delivered and patient signed. Patient choice form signed as well for Home Health. Elio was updated on discharge today. Home Health orders are in. She confirmed her roommate would be home to let her in, but she also has a way to get in. Follow-Up appointments reviewed. I also provided information on Medicaid Transportation.    [facility] Van to transport patient home.    Other Contacts    Name Relation Home Work Mobile   Quin Tarango Daughter   [phone]     Future Appointments   Date Time Provider Department Center   10/24/2024 11:00 AM Lamin Frazier PA-C [facility]   11/14/2024 10:00 AM [facility] US1 [facility]   11/21/2024  9:00 AM Humza Saenz MD [facility]   12/2/2024  1:00 PM Ryder Nichols MD [facility]                  Name Relationship Specialty Phone Fax Address Order                *[facility]  Home Health Services, Home Therapy Services, Home Living Aide Services [phone] [address] [place] [address] [place] [address]     Next Steps: Follow up  Instructions: [facility]  DME Provider Orthotics [phone] [address] [facility] [place] [address]     Next Steps: Follow up  Instructions: [facility]               10/18/24 1258   Final Note   Assessment Type Final Discharge Note   Anticipated Discharge Disposition Home-Health   Hospital Resources/Appts/Education Provided Appointments  scheduled and added to AVS   Post-Acute Status   Post-Acute Authorization Home Health;HME   HME Status (!) Pending Delivery   Home Health Status Set-up Complete/Auth obtained   Discharge Delays (!) Ambulance Transport/Facility Transport     Floresita Guillen RN    [phone]

## 2024-10-18 NOTE — DISCHARGE INSTRUCTIONS
Non-Emergency Medical Transportation  HOW TO ACCESS:  All Medicaid beneficiaries, who are eligible for transportation services and DO NOT receive transportation services through a managed care plan, should contact Gina to schedule a ride.  Beneficiaries may reach the Fee-For-Service Gina, at [phone].  Medicaid beneficiaries who DO receive transportation services from a managed care plan should contact the call centers as follows:  Health Plan  Phone Number TTY Phone Number   [employer] [facility] [phone]   [employer] [facility] [place] [phone]   [employer] [phone]   [employer] [facility] [phone]    [facility] [phone] (TTY:[id number])   [employer] [phone]      ELIGIBILITY:  Medicaid covered transportation is available to Medicaid beneficiaries when:  The beneficiary is enrolled in a Medicaid benefit program that explicitly includes transportation services; and  The beneficiary or their representative has stated that they have no other means of transportation.

## 2024-10-18 NOTE — PLAN OF CARE
[facility]      HOME HEALTH ORDERS  FACE TO FACE ENCOUNTER    Patient Name: Josselyn Tarango  YOB: 1965    PCP: Renetta, Primary Doctor   PCP Address: None  PCP Phone Number: None  PCP Fax: None    Encounter Date: 10/15/24    Admit to Home Health    Diagnoses:  Active Hospital Problems    Diagnosis  POA    *Sepsis [A41.9]  Yes    Bacteremia [R78.81]  Yes    JUAN LUIS (acute kidney injury) [N17.9]  No    SIRS (systemic inflammatory response syndrome) [R65.10]  Yes    Dependence on nicotine from cigarettes [F17.210]  Yes    Dysphagia [R13.10]  Yes    Pre-diabetes [R73.03]  Yes    Hypokalemia [E87.6]  Yes    Nephrolithiasis [N20.0]  Yes     Chronic     Bilateral non obstructing stones        Resolved Hospital Problems   No resolved problems to display.       Follow Up Appointments:  Future Appointments   Date Time Provider Department Center   11/14/2024 10:00 AM RVPH [address] [place] [address]   11/21/2024  9:00 AM Humza Saenz MD [facility] UROLOGY [facility]       Allergies:  Review of patient's allergies indicates:   Allergen Reactions    Sulfa (sulfonamide antibiotics)        Medications: Review discharge medications with patient and family and provide education.    Current Facility-Administered Medications   Medication Dose Route Frequency Provider Last Rate Last Admin    acetaminophen tablet 650 mg  650 mg Oral Q4H PRN Lauren Anderson NP   650 mg at 10/17/24 0840    aluminum-magnesium hydroxide-simethicone 200-200-20 mg/5 mL suspension 30 mL  30 mL Oral QID (AC & HS) Madelin [facility] MD   30 mL at 10/18/24 1139    ceFEPIme injection 2 g  2 g Intravenous Q12H Madelin Hunt MD   2 g at 10/18/24 0351    dextrose 10% bolus 125 mL 125 mL  12.5 g Intravenous PRN Madelin Hunt MD        dextrose 10% bolus 250 mL 250 mL  25 g Intravenous PRN Madelin Hunt MD        electrolytes-dextrose (Pedialyte) oral solution 600 mL  600 mL Oral Q4H Gilbert  Madelin DA SILVA MD   600 mL at 10/18/24 1140    glucagon (human recombinant) injection 1 mg  1 mg Intramuscular PRN Madelin Hunt MD        glucose chewable tablet 16 g  16 g Oral PRN Madelin Hunt MD        glucose chewable tablet 24 g  24 g Oral PRN Madelin Hunt MD        ibuprofen tablet 600 mg  600 mg Oral Q6H PRN Madelin Hunt MD   600 mg at 10/17/24 2037    melatonin tablet 6 mg  6 mg Oral Nightly PRN Lauren Anderson, EUSEBIO        nicotine 21 mg/24 hr 1 patch  1 patch Transdermal Daily Madelin Hunt MD   1 patch at 10/18/24 0827    ondansetron injection 4 mg  4 mg Intravenous Q6H PRN Lauren Anderson, NP   4 mg at 10/16/24 2350    oxybutynin tablet 5 mg  5 mg Oral TID Madelin Hunt MD   5 mg at 10/18/24 0827    oxyCODONE-acetaminophen 5-325 mg per tablet 2 tablet  2 tablet Oral Q4H PRN Lauren Anderson, NP   2 tablet at 10/18/24 1144    pantoprazole EC tablet 40 mg  40 mg Oral Daily Jongocent-Madelin Sheffield MD   40 mg at 10/18/24 0827    polyethylene glycol packet 17 g  17 g Oral Daily Madelin Hunt MD   17 g at 10/18/24 0827    simethicone 40 mg/0.6 mL drops 40 mg  40 mg Oral QID PRN Madelin Hunt MD   40 mg at 10/18/24 0834    sucralfate 100 mg/mL suspension 1 g  1 g Oral Q6H Madelin Hunt MD   1 g at 10/18/24 1139    tamsulosin 24 hr capsule 0.4 mg  0.4 mg Oral Daily Madelin Hunt MD   0.4 mg at 10/18/24 0827        Medication List        START taking these medications      ciprofloxacin HCl 750 MG tablet  Commonly known as: CIPRO  Take 1 tablet (750 mg total) by mouth every 12 (twelve) hours. for 11 days     ibuprofen 600 MG tablet  Commonly known as: ADVIL,MOTRIN  Take 1 tablet (600 mg total) by mouth every 6 (six) hours as needed.     nicotine 21 mg/24 hr  Commonly known as: NICODERM CQ  Place 1 patch onto the skin once daily.     pantoprazole 40 MG tablet  Commonly known as:  PROTONIX  Take 1 tablet (40 mg total) by mouth once daily.  Start taking on: October 19, 2024     simethicone 40 mg/0.6 mL drops  Commonly known as: MYLICON  Take 0.6 mLs (40 mg total) by mouth 4 (four) times daily as needed.            CHANGE how you take these medications      oxyCODONE-acetaminophen 7.5-325 mg per tablet  Commonly known as: PERCOCET  Take 1 tablet by mouth every 6 (six) hours as needed for Pain.  What changed: Another medication with the same name was removed. Continue taking this medication, and follow the directions you see here.     tamsulosin 0.4 mg Cap  Commonly known as: FLOMAX  Take 1 capsule (0.4 mg total) by mouth every evening. for 30 doses  What changed: Another medication with the same name was removed. Continue taking this medication, and follow the directions you see here.            CONTINUE taking these medications      ondansetron 4 MG Tbdl  Commonly known as: ZOFRAN-ODT  Take 1 tablet (4 mg total) by mouth every 6 (six) hours as needed (Nausea).     oxybutynin 5 MG Tab  Commonly known as: DITROPAN  Take 1 tablet (5 mg total) by mouth 3 (three) times daily. for 7 days            STOP taking these medications      ampicillin 500 MG capsule  Commonly known as: PRINCIPEN     EPINEPHrine 0.3 mg/0.3 mL Atin  Commonly known as: EPIPEN     ketorolac 10 mg tablet  Commonly known as: TORADOL     ondansetron 4 MG tablet  Commonly known as: ZOFRAN     phenazopyridine 100 MG tablet  Commonly known as: PYRIDIUM                I have seen and examined this patient within the last 30 days. My clinical findings that support the need for the home health skilled services and home bound status are the following:no   Weakness/numbness causing balance and gait disturbance due to Infection and Weakness/Debility making it taxing to leave home.  Requiring assistive device to leave home due to unsteady gait caused by  Infection and Weakness/Debility.  Patient with medication mismanagement issues  requiring home bound status as evidenced by  Unstable vital signs (blood pressure, heart rate).     Diet:   regular diet        Referrals/ Consults  Physical Therapy to evaluate and treat. Evaluate for home safety and equipment needs; Establish/upgrade home exercise program. Perform / instruct on therapeutic exercises, gait training, transfer training, and Range of Motion.  Occupational Therapy to evaluate and treat. Evaluate home environment for safety and equipment needs. Perform/Instruct on transfers, ADL training, ROM, and therapeutic exercises.    Activities:   activity as tolerated    Nursing:   Agency to admit patient within 24 hours of hospital discharge unless specified on physician order or at patient request    SN to complete comprehensive assessment including routine vital signs. Instruct on disease process and s/s of complications to report to MD. Review/verify medication list sent home with the patient at time of discharge  and instruct patient/caregiver as needed. Frequency may be adjusted depending on start of care date.     Skilled nurse to perform up to 3 visits PRN for symptoms related to diagnosis    Notify MD if SBP > 160 or < 90; DBP > 90 or < 50; HR > 120 or < 50; Temp > 101; O2 < 88%; Other:       Ok to schedule additional visits based on staff availability and patient request on consecutive days within the home health episode.    When multiple disciplines ordered:    Start of Care occurs on Sunday - Wednesday schedule remaining discipline evaluations as ordered on separate consecutive days following the start of care.    Thursday SOC -schedule subsequent evaluations Friday and Monday the following week.     Friday - Saturday SOC - schedule subsequent discipline evaluations on consecutive days starting Monday of the following week.    For all post-discharge communication and subsequent orders please contact patient's primary care physician. If unable to reach primary care physician or do not  receive response within 30 minutes, please contact  for clinical staff order clarification        Home Health Aide:  Nursing Three times weekly, Physical Therapy Three times weekly, and Occupational Therapy Three times weekly        I certify that this patient is confined to her home and needs intermittent skilled nursing care, physical therapy, and occupational therapy.

## 2024-10-18 NOTE — DISCHARGE SUMMARY
[facility] Medicine  Discharge Summary      Patient Name: Josselyn Tarango  MRN: [id number]  SIGIFREDO: [id number]  Patient Class: IP- Inpatient  Admission Date: 10/15/2024  Hospital Length of Stay: 3 days  Discharge Date and Time: 10/18/2024  4:42 PM  Attending Physician: Madelin Meneses*   Discharging Provider: Madelin Meneses MD  Primary Care Provider: Renetta, Primary Doctor    Primary Care Team: Networked reference to record PCT     HPI:   Josselyn Tarango is a 58 y/o F with PMH of prediabetes, diet controlled, nicotine dependence, ureteral lithiasis, s/p ureteral stent placement on 10/14, presents with 1 day history of generalized malaise, with associated chills with rigors, fever, nausea, vomiting, decreased appetite and abdominal pain.  Patient stated symptoms started few hours after her procedure-right ureteral stent placement.  She had received perioperative IV antibiotics, and continued with her p.o. antibiotics at home.  WBC 16.2, H/H 14/41, platelets 246, potassium 3.2, BUN/creatinine 1.2/10, LFT within normal limit chest x-ray unremarkable, CT renal stone study reports no stone bed nonspecific right-sided perinephric inflammatory changes.  Mild urothelial thickening in the visualized ureter of the right.  Patient is started on sepsis protocol, IV fluid and IV antibiotics.  Admit hospital medicine service    * No surgery found *      Hospital Course:   No notes on file     Goals of Care Treatment Preferences:  Code Status: Full Code         Consults:   Consults (From admission, onward)          Status Ordering Provider     Inpatient consult to Urology  Once        Provider:  Humza Saenz MD    Completed MADELIN MENESES.            Renal/  JUAN LUIS (acute kidney injury)  JUAN LUIS is likely due to pre-renal azotemia due to intravascular volume depletion. Baseline creatinine is  1,0 . Most recent creatinine and eGFR are listed below.  Recent Labs     10/16/24  2070  "10/17/24  0316 10/18/24  0410   CREATININE 1.5* 1.2 1.1   EGFRNORACEVR 40* 52* 58*        Plan  - JUAN LUIS is improving  - Avoid nephrotoxins and renally dose meds for GFR listed above  - Monitor urine output, serial BMP, and adjust therapy as needed  -     Hypokalemia  Patient's most recent potassium results are listed below.   Recent Labs     10/16/24  0244 10/17/24  0316 10/18/24  0410   K 4.3 3.5 4.2       Plan  - Replete potassium per protocol  - Monitor potassium Daily  - Patient's hypokalemia is improving  -     Nephrolithiasis  History of renal stone s/p ureteral stent placement for ureterolithiasis      ID  * Sepsis  Bacteremia   This patient does have evidence of infective focus  My overall impression is sepsis.  Source: Urinary Tract  Antibiotics given-   Antibiotics (72h ago, onward)      Start     Stop Route Frequency Ordered    10/16/24 1300  ceFEPIme injection 2 g         -- IV Every 12 hours (non-standard times) 10/16/24 1228          Latest lactate reviewed-  No results for input(s): "LACTATE", "POCLAC" in the last 72 hours.    Organ dysfunction indicated by  none    Fluid challenge Ideal Body Weight- The patient's ideal body weight is Ideal body weight: 65 kg (143 lb 6.5 oz) which will be used to calculate fluid bolus of 30 ml/kg for treatment of septic shock.      Post- resuscitation assessment Yes Perfusion exam was performed within 6 hours of septic shock presentation after bolus shows Adequate tissue perfusion assessed by invasive monitoring       Will Not start Pressors- Levophed for MAP of 65  Source control achieved by: IV abx        CT abdomen renal protocol  1. On the right, the patient is status post placement of a ureteral stent. Previously seen distal right renal calculus on the 10/10/2024 CT is not definitely appreciated.  Curvilinear calcification in involving right midpole calyx appears similar to prior.  Smaller adjoining punctate calcification/stone on the prior study is not definitely " seen on the current study.  Scratch nonspecific right-sided perinephric inflammatory change. Mild urothelial thickening in the visualized upper ureter on the right.  Findings could relate to sequela of recent instrumentation, noting that infectious process would not be excluded in the appropriate clinical context.  2. Indeterminate hypodense lesion right hepatic lobe similar to 10/10/2024 and 07/13/2024 CT.  Further characterization dedicated hepatic mass protocol MRI or CT would be recommended.  3. Details of additional chronic and incidental findings, as provided in the body of repor    Blood culture with Gram-negative rods-Pseudomonas, vanc/ceftriaxone switch to cefepime, repeat blood culture on 10/16 with NGTD    Endocrine  Pre-diabetes  HbA1c 5.3 four months ago  Continue diet controlled      GI  Dysphagia  Consult SLP- appreciate recs      Other  Dependence on nicotine from cigarettes  Dangers of cigarette smoking were reviewed with patient in detail. Patient was Counseled for 3-10 minutes. Nicotine replacement options were discussed. Nicotine replacement was discussed- prescribed      Final Active Diagnoses:    Diagnosis Date Noted POA    PRINCIPAL PROBLEM:  Sepsis [A41.9] 10/15/2024 Yes    Bacteremia [R78.81] 10/16/2024 Yes    JUAN LUIS (acute kidney injury) [N17.9] 10/16/2024 No    SIRS (systemic inflammatory response syndrome) [R65.10] 10/15/2024 Yes    Dependence on nicotine from cigarettes [F17.210] 10/15/2024 Yes    Dysphagia [R13.10] 10/15/2024 Yes    Pre-diabetes [R73.03] 10/15/2024 Yes    Hypokalemia [E87.6] 10/15/2024 Yes    Nephrolithiasis [N20.0] 06/07/2024 Yes     Chronic      Problems Resolved During this Admission:       Discharged Condition: stable    Disposition: Home-Health Care [facility]    Follow Up:   Follow-up Information       [facility] Follow up.    Specialties: Home Health Services, Home Therapy Services, Home Living Aide Services  Why: [facility] Company  Contact  "information:  [address], [place]  [facility] [address]  [phone]             [facility], [facility] Follow up.    Specialties: DME Provider, Orthotics  Why: [facility]  Contact information:  [address]  [facility]  [facility] [address]  [phone]                           Patient Instructions:      WALKER FOR HOME USE     Order Specific Question Answer Comments   Type of Walker: Adult (5'4"-6'6")    With wheels? Yes    Height: 5' 8.5" (1.74 m)    Weight: 100 kg (220 lb 7.4 oz)    Length of need (1-99 months): 99    Does patient have medical equipment at home? none    Please check all that apply: Patient's condition impairs ambulation.    Please check all that apply: Patient is unable to safely ambulate without equipment.      Ambulatory referral/consult to Smoking Cessation Program   Standing Status: Future   Referral Priority: Routine Referral Type: Consultation   Referral Reason: Specialty Services Required   Requested Specialty: CTTS   Number of Visits Requested: 1     Activity as tolerated       Significant Diagnostic Studies: N/A    Pending Diagnostic Studies:       None           Medications:  Reconciled Home Medications:      Medication List        START taking these medications      ciprofloxacin HCl 750 MG tablet  Commonly known as: CIPRO  Take 1 tablet (750 mg total) by mouth every 12 (twelve) hours. for 11 days     ibuprofen 600 MG tablet  Commonly known as: ADVIL,MOTRIN  Take 1 tablet (600 mg total) by mouth every 6 (six) hours as needed.     nicotine 21 mg/24 hr  Commonly known as: NICODERM CQ  Place 1 patch onto the skin once daily.     pantoprazole 40 MG tablet  Commonly known as: PROTONIX  Take 1 tablet (40 mg total) by mouth once daily.  Start taking on: October 19, 2024     simethicone 40 mg/0.6 mL drops  Commonly known as: MYLICON  Take 0.6 mLs (40 mg total) by mouth 4 (four) times daily as needed.            CHANGE how you take these medications  "     oxyCODONE-acetaminophen 7.5-325 mg per tablet  Commonly known as: PERCOCET  Take 1 tablet by mouth every 6 (six) hours as needed for Pain.  What changed: Another medication with the same name was removed. Continue taking this medication, and follow the directions you see here.     tamsulosin 0.4 mg Cap  Commonly known as: FLOMAX  Take 1 capsule (0.4 mg total) by mouth every evening. for 30 doses  What changed: Another medication with the same name was removed. Continue taking this medication, and follow the directions you see here.            CONTINUE taking these medications      ondansetron 4 MG Tbdl  Commonly known as: ZOFRAN-ODT  Take 1 tablet (4 mg total) by mouth every 6 (six) hours as needed (Nausea).     oxybutynin 5 MG Tab  Commonly known as: DITROPAN  Take 1 tablet (5 mg total) by mouth 3 (three) times daily. for 7 days            STOP taking these medications      ampicillin 500 MG capsule  Commonly known as: PRINCIPEN     EPINEPHrine 0.3 mg/0.3 mL Atin  Commonly known as: EPIPEN     ketorolac 10 mg tablet  Commonly known as: TORADOL     ondansetron 4 MG tablet  Commonly known as: ZOFRAN     phenazopyridine 100 MG tablet  Commonly known as: PYRIDIUM              Indwelling Lines/Drains at time of discharge:   Lines/Drains/Airways       None                   Time spent on the discharge of patient: 35 minutes         Madelin Hunt MD  Department of Hospital Medicine  [facility]

## 2024-10-18 NOTE — PROGRESS NOTES
[facility]  Urology  Progress Note    Patient Name: Josselyn Tarango  MRN: [id number]  Admission Date: 10/15/2024  Hospital Length of Stay: 3 days  Code Status: Full Code   Attending Provider: Madelin Hunt*   Primary Care Physician: Renetta, Primary Doctor    Subjective:     HPI:  Josselyn Tarango is a 59-year-old female who underwent right ureteroscopy with stent placement on 10/14/2024.  She now returns with concerns for sepsis following her procedure.  Inside the patient is currently afebrile though had a fever as high as 101.7 while in the ED as well as mild hypotension and tachycardia.  White count 16, creatinine 1.2, urine with occasional bacteria though contaminated with squames, nitrite negative.  Urine culture and blood cultures pending.  CT scan with a right ureteral stent in place without any other concerning findings aside from stranding and mild inflammation of the right ureter.  Right ureteral stent noted to have migrated distally slightly, though no concerns given no hydronephrosis.    Interval History:  No acute events overnight.  Afebrile with stable vital signs this morning.  Afebrile for the last 24 hours.  Pain with much improvement this morning.  Tolerating minimal p.o. intake.  Ambulating with assistance.  White count 6 this morning, creatinine normal.  One of 2 blood cultures growing Pseudomonas.  Urine culture remains no growth.      Objective:     Temp:  [97.7 °F (36.5 °C)-98.3 °F (36.8 °C)] 98.3 °F (36.8 °C)  Pulse:  [60-80] 69  Resp:  [16-18] 18  SpO2:  [94 %-97 %] 95 %  BP: ()/(52-73) 134/73     Body mass index is 33.03 kg/m².      Bladder Scan Volume (mL): 183 mL (10/16/24 1157)    Drains       None                    Physical Exam  Vitals and nursing note reviewed.   HENT:      Head: Atraumatic.      Nose: Nose normal.   Eyes:      Extraocular Movements: Extraocular movements intact.   Cardiovascular:      Rate and Rhythm: Normal rate.   Pulmonary:      Effort:  Pulmonary effort is normal.   Abdominal:      General: Abdomen is flat. There is no distension.      Tenderness: There is no abdominal tenderness. There is no right CVA tenderness or left CVA tenderness.   Musculoskeletal:         General: Normal range of motion.      Cervical back: Normal range of motion.   Skin:     Coloration: Skin is not jaundiced.   Neurological:      General: No focal deficit present.      Mental Status: She is alert and oriented to person, place, and time.   Psychiatric:         Mood and Affect: Mood normal.         Behavior: Behavior normal.           Significant Labs:    BMP:  Recent Labs   Lab 10/16/24  0244 10/17/24  0316 10/18/24  0410    138 138   K 4.3 3.5 4.2    107 105   CO2 19* 22* 27   BUN 12 15 15   CREATININE 1.5* 1.2 1.1   CALCIUM 8.8 8.3* 8.6*       CBC:   Recent Labs   Lab 10/16/24  0244 10/17/24  0316 10/18/24  0410   WBC 24.25* 13.54* 6.96   HGB 12.0 10.8* 10.4*   HCT 36.8* 32.4* 31.7*    157 132*       All pertinent labs results from the past 24 hours have been reviewed.    Significant Imaging:  All pertinent imaging results/findings from the past 24 hours have been reviewed.                  Assessment/Plan:     Nephrolithiasis  - no acute interventions from Urology at this time  - we discussed possible sent with the patient and she is adamant that she would like to refrain from any surgical interventions at this time.  We discussed that this was reasonable given her subjective and objective improvements in the last 24 hours.  - ureteral stent removed 10/16/2024  - recommend broad-spectrum IV antibiotics, follow up all cultures and tailor antibiotics as needed.  On cefepime.  - all remaining care per primary team  - please reach out to Urology with questions or concerns  - follow up with Dr. Saenz as scheduled        VTE Risk Mitigation (From admission, onward)           Ordered     IP VTE HIGH RISK PATIENT  Once         10/15/24 0738     Place  sequential compression device  Until discontinued         10/15/24 6198                    Bran Gil MD  Urology  [facility]

## 2024-10-18 NOTE — SUBJECTIVE & OBJECTIVE
Interval History:  No acute events overnight.  Afebrile with stable vital signs this morning.  Afebrile for the last 24 hours.  Pain with much improvement this morning.  Tolerating minimal p.o. intake.  Ambulating with assistance.  White count 6 this morning, creatinine normal.  One of 2 blood cultures growing Pseudomonas.  Urine culture remains no growth.      Objective:     Temp:  [97.7 °F (36.5 °C)-98.3 °F (36.8 °C)] 98.3 °F (36.8 °C)  Pulse:  [60-80] 69  Resp:  [16-18] 18  SpO2:  [94 %-97 %] 95 %  BP: ()/(52-73) 134/73     Body mass index is 33.03 kg/m².      Bladder Scan Volume (mL): 183 mL (10/16/24 1157)    Drains       None                    Physical Exam  Vitals and nursing note reviewed.   HENT:      Head: Atraumatic.      Nose: Nose normal.   Eyes:      Extraocular Movements: Extraocular movements intact.   Cardiovascular:      Rate and Rhythm: Normal rate.   Pulmonary:      Effort: Pulmonary effort is normal.   Abdominal:      General: Abdomen is flat. There is no distension.      Tenderness: There is no abdominal tenderness. There is no right CVA tenderness or left CVA tenderness.   Musculoskeletal:         General: Normal range of motion.      Cervical back: Normal range of motion.   Skin:     Coloration: Skin is not jaundiced.   Neurological:      General: No focal deficit present.      Mental Status: She is alert and oriented to person, place, and time.   Psychiatric:         Mood and Affect: Mood normal.         Behavior: Behavior normal.           Significant Labs:    BMP:  Recent Labs   Lab 10/16/24  0244 10/17/24  0316 10/18/24  0410    138 138   K 4.3 3.5 4.2    107 105   CO2 19* 22* 27   BUN 12 15 15   CREATININE 1.5* 1.2 1.1   CALCIUM 8.8 8.3* 8.6*       CBC:   Recent Labs   Lab 10/16/24  0244 10/17/24  0316 10/18/24  0410   WBC 24.25* 13.54* 6.96   HGB 12.0 10.8* 10.4*   HCT 36.8* 32.4* 31.7*    157 132*       All pertinent labs results from the past 24 hours have  been reviewed.    Significant Imaging:  All pertinent imaging results/findings from the past 24 hours have been reviewed.

## 2024-10-18 NOTE — ASSESSMENT & PLAN NOTE
"Bacteremia   This patient does have evidence of infective focus  My overall impression is sepsis.  Source: Urinary Tract  Antibiotics given-   Antibiotics (72h ago, onward)    Start     Stop Route Frequency Ordered    10/16/24 1300  ceFEPIme injection 2 g         -- IV Every 12 hours (non-standard times) 10/16/24 1228        Latest lactate reviewed-  No results for input(s): "LACTATE", "POCLAC" in the last 72 hours.    Organ dysfunction indicated by  none    Fluid challenge Ideal Body Weight- The patient's ideal body weight is Ideal body weight: 65 kg (143 lb 6.5 oz) which will be used to calculate fluid bolus of 30 ml/kg for treatment of septic shock.      Post- resuscitation assessment Yes Perfusion exam was performed within 6 hours of septic shock presentation after bolus shows Adequate tissue perfusion assessed by invasive monitoring       Will Not start Pressors- Levophed for MAP of 65  Source control achieved by: IV abx        CT abdomen renal protocol  1. On the right, the patient is status post placement of a ureteral stent. Previously seen distal right renal calculus on the 10/10/2024 CT is not definitely appreciated.  Curvilinear calcification in involving right midpole calyx appears similar to prior.  Smaller adjoining punctate calcification/stone on the prior study is not definitely seen on the current study.  Scratch nonspecific right-sided perinephric inflammatory change. Mild urothelial thickening in the visualized upper ureter on the right.  Findings could relate to sequela of recent instrumentation, noting that infectious process would not be excluded in the appropriate clinical context.  2. Indeterminate hypodense lesion right hepatic lobe similar to 10/10/2024 and 07/13/2024 CT.  Further characterization dedicated hepatic mass protocol MRI or CT would be recommended.  3. Details of additional chronic and incidental findings, as provided in the body of repor    Blood culture with Gram-negative " rods-Pseudomonas, vanc/ceftriaxone switch to cefepime, repeat blood culture on 10/16 with NGTD

## 2024-10-18 NOTE — PT/OT/SLP PROGRESS
Physical Therapy Treatment    Patient Name:  Josselyn Tarango   MRN:  [id number]    Recommendations:     Discharge Recommendations: Low Intensity Therapy (HH PT/OT)  Discharge Equipment Recommendations: walker, rolling, bath bench  Barriers to discharge:  decreased functional mobility    Assessment:     Josselyn Tarango is a 59 y.o. female admitted with a medical diagnosis of Sepsis.  She presents with the following impairments/functional limitations: weakness, impaired endurance, impaired self care skills, impaired functional mobility, gait instability, decreased lower extremity function, decreased coordination Pt would continue to benefit from P.T. To address impairments listed above.  .    Rehab Prognosis: Fair; patient would benefit from acute skilled PT services to address these deficits and reach maximum level of function.    Recent Surgery: * No surgery found *      Plan:     During this hospitalization, patient to be seen 3 x/week to address the identified rehab impairments via gait training, therapeutic activities, therapeutic exercises, neuromuscular re-education and progress toward the following goals:    Plan of Care Expires:  11/17/24    Subjective     Chief Complaint: headache  Patient/Family Comments/goals: Pt agreed to tx.  Pain/Comfort:  Pain Rating 1: 7/10  Location - Orientation 1: anterior  Location 1: head  Pain Addressed 1: Distraction, Reposition, Cessation of Activity, Nurse notified  Pain Rating Post-Intervention 1: 7/10      Objective:     Communicated with RN prior to session.  Patient found HOB elevated with bed alarm, telemetry upon PT entry to room.     General Precautions: Standard, fall  Orthopedic Precautions: N/A  Braces: N/A  Respiratory Status: Room air     Functional Mobility:  Bed Mobility:     Scooting: modified independence  Supine to Sit: modified independence  Sit to Supine: modified independence  Transfers:     Sit to Stand:  contact guard assistance with no AD  Toilet  Transfer: supervision/SBA using  Step Transfer  Gait: 15ft x 2 without A.D. and CGA.  Fairly steady gait.  No LOB.  Balance: sitting good, standing good-, gait fair/good-      AM-PAC 6 CLICK MOBILITY  Turning over in bed (including adjusting bedclothes, sheets and blankets)?: 4  Sitting down on and standing up from a chair with arms (e.g., wheelchair, bedside commode, etc.): 3  Moving from lying on back to sitting on the side of the bed?: 4  Moving to and from a bed to a chair (including a wheelchair)?: 3  Need to walk in hospital room?: 3  Climbing 3-5 steps with a railing?: 3  Basic Mobility Total Score: 20       Treatment & Education:  Pt agreed to tx, but stated she had a headache and was recently given pain medication (7/10).  Seated BLE therex: AP, LAQs x 10 reps and standing marching in place x 10 reps with CGA.  Pt transferred onto and off of toilet with S/SBA and was independent to hygiene (urine). Pt then ambulated to sink and washed her hands independently.  Pt declined further gait at this time or sitting up in b/s chair and requested to return to bed secondary to 7/10 pain, headache.    Patient left HOB elevated with all lines intact, call button in reach, bed alarm on, and RN notified..    GOALS:   Multidisciplinary Problems       Physical Therapy Goals          Problem: Physical Therapy    Goal Priority Disciplines Outcome Interventions   Physical Therapy Goal     PT, PT/OT Progressing    Description: Goals to be met by: 24     Patient will increase functional independence with mobility by performin. Supine to sit with Stand-by Assistance  2. Sit to supine with Stand-by Assistance  3. Sit to stand transfer with Supervision  4. Bed to chair transfer with Supervision using Rolling Walker  5. Gait  x 150 feet with Supervision using Rolling Walker.   6. Ascend/descend 5 steps with 1-2 Handrails Contact Guard Assistance using No Assistive Device.                          Time Tracking:     PT  Received On: 10/18/24  PT Start Time: 1144     PT Stop Time: 1204  PT Total Time (min): 20 min     Billable Minutes: Gait Training 8 and Therapeutic Activity 12    Treatment Type: Treatment  PT/PTA: PTA     Number of PTA visits since last PT visit: 1     10/18/2024

## 2024-10-18 NOTE — PT/OT/SLP PROGRESS
Occupational Therapy   Treatment    Name: Josselyn Tarango  MRN: [id number]  Admitting Diagnosis:  Sepsis       Recommendations:     Discharge Recommendations: Low Intensity Therapy  Discharge Equipment Recommendations:  to be determined by next level of care  Barriers to discharge:   (No acute OT barriers)    Assessment:     Josselyn Tarango is a 59 y.o. female with a medical diagnosis of Sepsis.  She presents with The primary encounter diagnosis was Sepsis, due to unspecified organism, unspecified whether acute organ dysfunction present. Diagnoses of SIRS (systemic inflammatory response syndrome), History of kidney stones, JUAN LUIS (acute kidney injury), Nephrolithiasis, and Kidney stone on right side were also pertinent to this visit.  Performance deficits affecting function are pain, impaired self care skills, decreased coordination, decreased ROM, decreased lower extremity function, decreased upper extremity function.     Rehab Prognosis:  Good; patient would benefit from acute skilled OT services to address these deficits and reach maximum level of function.       Plan:     Patient to be seen 3 x/week to address the above listed problems via self-care/home management, therapeutic activities, therapeutic exercises  Plan of Care Expires: 11/13/24  Plan of Care Reviewed with: patient    Subjective     Chief Complaint: R abdomen pain  Patient/Family Comments/goals: Pt asked for medicine for gas  Pain/Comfort:  Pain Rating 1: 7/10  Location - Side 1: Right  Location 1: abdomen  Pain Addressed 1: Reposition, Distraction, Nurse notified  Pain Rating 2: 7/10  Location - Side 2: Left  Location 2: hip  Pain Addressed 2: Reposition, Distraction, Nurse notified, Cessation of Activity    Objective:     Communicated with: nurse prior to session.  Patient found HOB elevated with telemetry upon OT entry to room.    General Precautions: Standard, fall    Orthopedic Precautions:N/A  Braces: N/A  Respiratory Status: Room air      Occupational Performance:     Bed Mobility:    Patient completed Rolling/Turning to Right with independence  Patient completed Supine to Sit with independence  Patient completed Sit to Supine with independence     Functional Mobility/Transfers:  Patient completed Sit <> Stand Transfer with SBA and progressed to independence with no assistive device   Patient completed Toilet Transfer Step Transfer technique with supervision with  no AD  Functional Mobility: In room mobility to/from bathroom with distant supervision and no AD. Out of room mobility to nurses station with distant supervision with no AD.     Activities of Daily Living:  Feeding:  [place]; self drinking  Lower Body Dressing: supervision partial dressing, socks  Toileting: supervision true toilet      [facility] 6 Click ADL: 24    Treatment & Education:  Patient re-educated on role of OT.  Patient oriented x 4  ADL / functional mobility training as above.  Utilized simple instructions throughout session.  Vital monitored      10/18/24 1037 10/18/24 1050   Vital Signs   Pulse 83 84   SpO2 96 % 97 %   BP (!) 118/59 (!) 141/66   MAP (mmHg) 85 95   BP Location Right arm Right arm   Patient Position Lying Sitting     Re-educated on energy conservation techniques   Pt educated on the importance of repositioning in bed throughout the day.  Therapist used motivational therapeutic mode to improve to improve self efficacy.   Educated on call light.   100% reciprocation for call light.  Answered inquires in scope.              Patient left HOB elevated with all lines intact, call button in reach, bed alarm on, and nurse notified    GOALS:   Multidisciplinary Problems       Occupational Therapy Goals          Problem: Occupational Therapy    Goal Priority Disciplines Outcome Interventions   Occupational Therapy Goal     OT, PT/OT Progressing    Description: Goals to be met by: 11/13/2024     Patient will increase functional independence with ADLs by  performing:    UE Dressing with [place].  LE Dressing with [place].  Toileting from toilet with Modified [place] for hygiene and clothing management.   Toilet transfer to toilet with [place].  100% reciprocation of at least two non pharmacological pain management strategies.  Functional mobility x5 minutes with SBA with least restrictive device and no adverse response.                            Time Tracking:     OT Date of Treatment: 10/18/24  OT Start Time: 1033  OT Stop Time: 1057  OT Total Time (min): 24 min    Billable Minutes:Self Care/Home Management 12  Therapeutic Activity 12    OT/JUVENCIO: OT          10/18/2024

## 2024-10-18 NOTE — ASSESSMENT & PLAN NOTE
- no acute interventions from Urology at this time  - we discussed possible sent with the patient and she is adamant that she would like to refrain from any surgical interventions at this time.  We discussed that this was reasonable given her subjective and objective improvements in the last 24 hours.  - ureteral stent removed 10/16/2024  - recommend broad-spectrum IV antibiotics, follow up all cultures and tailor antibiotics as needed.  On cefepime.  - all remaining care per primary team  - please reach out to Urology with questions or concerns  - follow up with Dr. Saenz as scheduled

## 2024-10-18 NOTE — PROGRESS NOTES
[facility] Medicine  Progress Note    Patient Name: Josselyn Tarango  MRN: [id number]  Patient Class: IP- Inpatient   Admission Date: 10/15/2024  Length of Stay: 3 days  Attending Physician: Madelin Hunt*  Primary Care Provider: Renetta, Primary Doctor        Subjective:     Principal Problem:Sepsis    HPI:  Josselyn Tarango is a 58 y/o F with PMH of prediabetes, diet controlled, nicotine dependence, ureteral lithiasis, s/p ureteral stent placement on 10/14, presents with 1 day history of generalized malaise, with associated chills with rigors, fever, nausea, vomiting, decreased appetite and abdominal pain.  Patient stated symptoms started few hours after her procedure-right ureteral stent placement.  She had received perioperative IV antibiotics, and continued with her p.o. antibiotics at home.  WBC 16.2, H/H 14/41, platelets 246, potassium 3.2, BUN/creatinine 1.2/10, LFT within normal limit chest x-ray unremarkable, CT renal stone study reports no stone bed nonspecific right-sided perinephric inflammatory changes.  Mild urothelial thickening in the visualized ureter of the right.  Patient is started on sepsis protocol, IV fluid and IV antibiotics.  Admit hospital medicine service    Overview/Hospital Course:  No notes on file    Interval History:  Awake, alert, no new complaint.   JUAN LUIS-downtrending  WBC downtrending, blood culture with Pseudomonas antibiotics changed to meropenem-discharge on Cipro      Review of Systems   Constitutional:  Positive for activity change, appetite change and fatigue. Negative for chills and fever.   Respiratory:  Negative for shortness of breath.    Gastrointestinal:  Positive for abdominal pain. Negative for nausea and vomiting.   Genitourinary:  Negative for difficulty urinating, dysuria, flank pain, pelvic pain and urgency.   Musculoskeletal:  Negative for arthralgias.   Skin:  Negative for pallor and rash.   Neurological:  Positive for weakness.  "  Psychiatric/Behavioral:  Negative for confusion.      Objective:     Vital Signs (Most Recent):  Temp: 98.1 °F (36.7 °C) (10/18/24 0453)  Pulse: 69 (10/18/24 0453)  Resp: 18 (10/18/24 0453)  BP: (!) 118/59 (10/18/24 0453)  SpO2: 95 % (10/18/24 0453) Vital Signs (24h Range):  Temp:  [97.7 °F (36.5 °C)-101.2 °F (38.4 °C)] 98.1 °F (36.7 °C)  Pulse:  [60-89] 69  Resp:  [16-18] 18  SpO2:  [94 %-97 %] 95 %  BP: ()/(52-70) 118/59     Weight: 100 kg (220 lb 7.4 oz)  Body mass index is 33.03 kg/m².    Intake/Output Summary (Last 24 hours) at 10/18/2024 0742  Last data filed at 10/18/2024 0347  Gross per 24 hour   Intake 1420 ml   Output 2200 ml   Net -780 ml         Physical Exam  HENT:      Head: Normocephalic and atraumatic.   Cardiovascular:      Rate and Rhythm: Normal rate.   Pulmonary:      Effort: Pulmonary effort is normal.   Abdominal:      General: Bowel sounds are normal.      Palpations: Abdomen is soft.      Tenderness: There is abdominal tenderness in the right lower quadrant.   Musculoskeletal:      Cervical back: Normal range of motion.      Right lower leg: No edema.      Left lower leg: No edema.   Skin:     Findings: Lesion present.   Neurological:      Mental Status: She is alert.   Psychiatric:         Mood and Affect: Mood normal.           Significant Labs: A1C:   Recent Labs   Lab 06/08/24  0626   HGBA1C 5.3     ABGs:   No results for input(s): "PH", "PCO2", "HCO3", "POCSATURATED", "BE", "TOTALHB", "COHB", "METHB", "O2HB", "POCFIO2", "PO2" in the last 48 hours.    Blood Culture:   No results for input(s): "LABBLOO" in the last 48 hours.    CBC:   Recent Labs   Lab 10/17/24  0316 10/18/24  0410   WBC 13.54* 6.96   HGB 10.8* 10.4*   HCT 32.4* 31.7*    132*     CMP:   Recent Labs   Lab 10/17/24  0316 10/18/24  0410    138   K 3.5 4.2    105   CO2 22* 27    89   BUN 15 15   CREATININE 1.2 1.1   CALCIUM 8.3* 8.6*   PROT 5.0* 5.4*   ALBUMIN 2.1* 2.2*   BILITOT 0.2 0.3 " "  ALKPHOS 73 77   AST 10 10   ALT 8* 8*   ANIONGAP 9 6*     Lactic Acid:   No results for input(s): "LACTATE" in the last 48 hours.    Lipase: No results for input(s): "LIPASE" in the last 48 hours.  Lipid Panel: No results for input(s): "CHOL", "HDL", "LDLCALC", "TRIG", "CHOLHDL" in the last 48 hours.  Magnesium:   Recent Labs   Lab 10/17/24  0316 10/18/24  0410   MG 1.8 2.0     Troponin: No results for input(s): "TROPONINI", "TROPONINIHS" in the last 48 hours.  TSH: No results for input(s): "TSH" in the last 4320 hours.  Urine Culture: No results for input(s): "LABURIN" in the last 48 hours.  Urine Studies:   No results for input(s): "COLORU", "APPEARANCEUA", "PHUR", "SPECGRAV", "PROTEINUA", "GLUCUA", "KETONESU", "BILIRUBINUA", "OCCULTUA", "NITRITE", "UROBILINOGEN", "LEUKOCYTESUR", "RBCUA", "WBCUA", "BACTERIA", "SQUAMEPITHEL", "HYALINECASTS" in the last 48 hours.    Invalid input(s): "WRIGHTSUR"      Significant Imaging: I have reviewed all pertinent imaging results/findings within the past 24 hours.    Assessment/Plan:      * Sepsis  Bacteremia   This patient does have evidence of infective focus  My overall impression is sepsis.  Source: Urinary Tract  Antibiotics given-   Antibiotics (72h ago, onward)      Start     Stop Route Frequency Ordered    10/16/24 1300  ceFEPIme injection 2 g         -- IV Every 12 hours (non-standard times) 10/16/24 1228          Latest lactate reviewed-  No results for input(s): "LACTATE", "POCLAC" in the last 72 hours.    Organ dysfunction indicated by  none    Fluid challenge Ideal Body Weight- The patient's ideal body weight is Ideal body weight: 65 kg (143 lb 6.5 oz) which will be used to calculate fluid bolus of 30 ml/kg for treatment of septic shock.      Post- resuscitation assessment Yes Perfusion exam was performed within 6 hours of septic shock presentation after bolus shows Adequate tissue perfusion assessed by invasive monitoring       Will Not start Pressors- Levophed " for MAP of 65  Source control achieved by: IV abx        CT abdomen renal protocol  1. On the right, the patient is status post placement of a ureteral stent. Previously seen distal right renal calculus on the 10/10/2024 CT is not definitely appreciated.  Curvilinear calcification in involving right midpole calyx appears similar to prior.  Smaller adjoining punctate calcification/stone on the prior study is not definitely seen on the current study.  Scratch nonspecific right-sided perinephric inflammatory change. Mild urothelial thickening in the visualized upper ureter on the right.  Findings could relate to sequela of recent instrumentation, noting that infectious process would not be excluded in the appropriate clinical context.  2. Indeterminate hypodense lesion right hepatic lobe similar to 10/10/2024 and 07/13/2024 CT.  Further characterization dedicated hepatic mass protocol MRI or CT would be recommended.  3. Details of additional chronic and incidental findings, as provided in the body of repor    Blood culture with Gram-negative rods-Pseudomonas, vanc/ceftriaxone switch to cefepime, repeat blood culture on 10/16 with NGTD    JUAN LUIS (acute kidney injury)  JUAN LUIS is likely due to pre-renal azotemia due to intravascular volume depletion. Baseline creatinine is  1,0 . Most recent creatinine and eGFR are listed below.  Recent Labs     10/13/24  2240 10/15/24  0351 10/16/24  0244   CREATININE 1.0 1.2 1.5*   EGFRNORACEVR >60 52* 40*      Plan  - JUAN LUIS is improving  - Avoid nephrotoxins and renally dose meds for GFR listed above  - Monitor urine output, serial BMP, and adjust therapy as needed  -     Hypokalemia  Patient's most recent potassium results are listed below.   Recent Labs     10/16/24  0244 10/17/24  0316 10/18/24  0410   K 4.3 3.5 4.2       Plan  - Replete potassium per protocol  - Monitor potassium Daily  - Patient's hypokalemia is improving  -     Pre-diabetes  HbA1c 5.3 four months ago  Continue diet  controlled      Dysphagia  Consult SLP- appreciate recs      Dependence on nicotine from cigarettes  Dangers of cigarette smoking were reviewed with patient in detail. Patient was Counseled for 3-10 minutes. Nicotine replacement options were discussed. Nicotine replacement was discussed- prescribed    Nephrolithiasis  History of renal stone s/p ureteral stent placement for ureterolithiasis        VTE Risk Mitigation (From admission, onward)           Ordered     IP VTE HIGH RISK PATIENT  Once         10/15/24 0738     Place sequential compression device  Until discontinued         10/15/24 0738                    Discharge Planning   PIERO: 10/18/2024     Code Status: Full Code   Is the patient medically ready for discharge?:     Reason for patient still in hospital (select all that apply): Pending disposition  Discharge Plan A: Home, Home Health   Discharge Delays: (!) Ambulance Transport/Facility Transport              Madelin Hunt MD  Department of Hospital Medicine   [place] [facility]

## 2024-10-18 NOTE — ASSESSMENT & PLAN NOTE
JUAN LUIS is likely due to pre-renal azotemia due to intravascular volume depletion. Baseline creatinine is  1,0 . Most recent creatinine and eGFR are listed below.  Recent Labs     10/16/24  0244 10/17/24  0316 10/18/24  0410   CREATININE 1.5* 1.2 1.1   EGFRNORACEVR 40* 52* 58*        Plan  - JUAN LUIS is improving  - Avoid nephrotoxins and renally dose meds for GFR listed above  - Monitor urine output, serial BMP, and adjust therapy as needed  -

## 2024-10-18 NOTE — ASSESSMENT & PLAN NOTE
Patient's most recent potassium results are listed below.   Recent Labs     10/16/24  0244 10/17/24  0316 10/18/24  0410   K 4.3 3.5 4.2       Plan  - Replete potassium per protocol  - Monitor potassium Daily  - Patient's hypokalemia is improving  -

## 2024-10-18 NOTE — PLAN OF CARE
10/18/24 1438   AVS Confirmation   Discharge instructions and AVS provided to and reviewed with patient and/or significant other. Yes       AVS printed and handed to patient by bedside nurse. VN reviewed discharge instructions with patient using teachback method.  Allowed time for questions, all questions answered.  Patient verbalized complete understanding of discharge instructions and voices no concerns. Discharge instructions complete. Bedside delivery complete. Bedside nurse notified.

## 2024-10-20 LAB — BACTERIA BLD CULT: NORMAL

## 2024-10-31 ENCOUNTER — TELEPHONE (OUTPATIENT)
Dept: HOME HEALTH SERVICES | Facility: CLINIC | Age: 59
End: 2024-10-31
Payer: COMMERCIAL

## 2024-10-31 DIAGNOSIS — E11.9 TYPE 2 DIABETES MELLITUS WITHOUT COMPLICATION, WITHOUT LONG-TERM CURRENT USE OF INSULIN: ICD-10-CM

## 2024-10-31 DIAGNOSIS — N13.30 HYDRONEPHROSIS, UNSPECIFIED HYDRONEPHROSIS TYPE: Primary | ICD-10-CM

## 2024-11-06 ENCOUNTER — CARE AT HOME (OUTPATIENT)
Dept: HOME HEALTH SERVICES | Facility: CLINIC | Age: 59
End: 2024-11-06
Payer: COMMERCIAL

## 2024-11-06 VITALS
OXYGEN SATURATION: 98 % | SYSTOLIC BLOOD PRESSURE: 136 MMHG | RESPIRATION RATE: 18 BRPM | DIASTOLIC BLOOD PRESSURE: 74 MMHG | HEART RATE: 76 BPM

## 2024-11-06 DIAGNOSIS — R32 URINARY INCONTINENCE, UNSPECIFIED TYPE: ICD-10-CM

## 2024-11-06 DIAGNOSIS — N20.0 NEPHROLITHIASIS: Chronic | ICD-10-CM

## 2024-11-06 DIAGNOSIS — N17.9 AKI (ACUTE KIDNEY INJURY): Primary | ICD-10-CM

## 2024-11-06 DIAGNOSIS — Z76.0 MEDICATION REFILL: ICD-10-CM

## 2024-11-06 PROCEDURE — 99349 HOME/RES VST EST MOD MDM 40: CPT | Mod: S$GLB,,,

## 2024-11-06 RX ORDER — PANTOPRAZOLE SODIUM 40 MG/1
40 TABLET, DELAYED RELEASE ORAL DAILY
Qty: 30 TABLET | Refills: 0 | Status: SHIPPED | OUTPATIENT
Start: 2024-11-06 | End: 2025-11-06

## 2024-11-06 NOTE — PROGRESS NOTES
[facility]  Medical Chronic Care Home Visit    Encounter Provider: Agusto Roldan   PCP: Renetta, Primary Doctor  Consult Requested By: Allyssa Tyler    HISTORY OF PRESENT ILLNESS      Patient ID: Josselyn Tarango is a 59 y.o. female is being seen in the home due to physical debility that presents a taxing effort to leave the home, to mitigate high risk of hospital readmission and/or due to the limited availability of reliable or safe options for transportation to the point of access to the provider. Prior to treatment on this visit the chart was reviewed and patient verbal consent was obtained.    Chronic medical conditions synopsis:  Josselyn Tarango is a 58 y/o F being seen today for a follow up visit. Medical diagnoses include prediabetes, diet controlled, nicotine dependence, adrenal adenoma, ureteral lithiasis, s/p ureteral stent placement on 10/14.   Recent hospitalization 10/15-10/18 with generalized malaise, with associated chills with rigors, fever, nausea, vomiting, decreased appetite and abdominal pain following stent placement.  Symptoms started few hours after her procedure-right ureteral stent placement.  She had received perioperative IV antibiotics, and continued with her p.o. antibiotics at home.  WBC 16.2, H/H 14/41, platelets 246, potassium 3.2, BUN/creatinine 1.2/10, LFT within normal limit chest x-ray unremarkable, CT renal stone study reports no stone bed nonspecific right-sided perinephric inflammatory changes.  Mild urothelial thickening in the visualized ureter of the right.     [facility] working with patient. Doing ok today. Still with some right sided flank pain. Has occasional nausea. Reports stress incontinence. Reports congestion, likely allergies. Needs transportation to get to appointments. Instructed to call medicaid transportation and set up. Denies further complaints or concerns. Questions elicited. Time allowed for questions, all issues addressed. Contact info given for  any concerns or changes. Has US 11/14 and follow up with urology 11/21. Reports she has PCP [facility] and will follow up.     DECISION MAKING TODAY       Assessment & Plan:  1. JUAN ULIS (acute kidney injury)  Assessment & Plan:  Resolved  Cr 1.1, eGFR 58  Renally dose meds, avoid nephrotoxins  Continue current plan  F/U with PCP      2. Nephrolithiasis  Overview:  Bilateral non obstructing stones    Assessment & Plan:  CT renal on 10/10 revealed mild prominence of the right renal collecting system/ureter secondary to a 5-6 mm calculus within the distal aspect of the right ureter, bilateral nonobstructive nephrolithiasis.   S/P cysto with lithotripsy and stent placement 10/14  CT renal on 10/15 revealed Previously seen distal right renal calculus on the 10/10/2024 CT is not definitely appreciated. Smaller adjoining punctate calcification/stone on the prior study is not definitely seen on the current study.   Continue current medications  F/U with urology      Orders:  -     Ambulatory referral/consult to [facility]    3. Urinary incontinence, unspecified type  Comments:  reports leaking when moving and standing  Reports occasional loss of sensation to void  Continue current plan  F/U with urology  Orders:  -     Ambulatory referral/consult to [facility]    4. Medication refill  -     pantoprazole (PROTONIX) 40 MG tablet; Take 1 tablet (40 mg total) by mouth once daily.  Dispense: 30 tablet; Refill: 0           ENVIRONMENT OF CARE      Family and/or Caregiver present at visit?  Yes  Name of Caregiver: roommate  History provided by: patient    4Ms for Medical Decision-Making in Older Adults    Last Completed EAWV: None    MOBILITY:  Get Up and Go:       No data to display              Activities of Daily Living:       No data to display              Whisper Test:       No data to display              Disability Status:       No data to display              Nutrition  Screening:       No data to display             Screening Score: 0-7 Malnourished, 8-11 At Risk, 12-14 Normal  Fall Risk:      6/25/2024    10:00 AM   Fall Risk Assessment - Outpatient   Mobility Status Ambulatory   Number of falls 0   Identified as fall risk False           MENTATION:   Depression Patient Health Questionnaire:       No data to display              Has Dementia Dx: No  Has Anxiety Dx: No    Cognitive Function Screening:       No data to display              Cognitive Function Screening Total - Less than 4 = Abnormal,  Greater than or equal to 4 = Normal        MEDICATIONS:  High Risk Medications:  Total Active Medications: 1  oxyCODONE-acetaminophen - 7.5-325 mg    WHAT MATTERS MOST:  Advance Care Planning   ACP Status:   Patient has had an ACP conversation  Living Will: No  Power of : No  LaPOST: No    What is most important right now is to focus on symptom/pain control    Accordingly, we have decided that the best plan to meet the patient's goals includes continuing with treatment      What matters most to patient today is: transportation           Is patient hospice appropriate: No  (If needed, use PPS <30 or FAST score >7)  Was referral to hospice placed: No    Impression upon entering the home:  Physical Dwelling: trailer   Appearance of home environment: cleaniness: dirty, walking pathways: clear, lighting: adequate, and home structure: visible structural issues  Functional Status: independent  Mobility: ambulatory  Nutritional access: available food but inadequate intake  Home Health: Yes,  Agency [facility]     DME/Supplies: none     Disease/illness education: Take all medication as prescribed. Activity as tolerated. Keep all upcoming appts.   Establishment or re-establishment of referral orders for community resources: No other necessary community resources.   Discussion with other health care providers: No discussion with other health care providers necessary.   Does patient have a PCP  at [place]? No   Repatriation plan with PCP? follow-up with PCP within 30d   Does patient have an ostomy (ileostomy, colostomy, suprapubic catheter, nephrostomy tube, tracheostomy, PEG tube, pleurex catheter, cholecystostomy, etc)? No  Were BPAs reviewed? Yes    Social History     Socioeconomic History    Marital status: Single   Tobacco Use    Smoking status: Every Day     Current packs/day: 1.00     Average packs/day: 1 pack/day for 44.9 years (44.9 ttl pk-yrs)     Types: Cigarettes     Start date: 1980    Smokeless tobacco: Never    Tobacco comments:     Smoking cessation education note: Pt is a 1 pk/day cigarette smoker x 45 yrs. 21 mg nicotine patch ordered Q day. Pt states not ready to quit. Handout provided.      Social Drivers of Health     Financial Resource Strain: High Risk (10/19/2024)    Overall Financial Resource Strain (CARDIA)     Difficulty of Paying Living Expenses: Very hard   Food Insecurity: Food Insecurity Present (10/19/2024)    Hunger Vital Sign     Worried About Running Out of Food in the Last Year: Often true     Ran Out of Food in the Last Year: Often true   Transportation Needs: No Transportation Needs (10/16/2024)    TRANSPORTATION NEEDS     Transportation : No   Recent Concern: Transportation Needs - Unmet Transportation Needs (10/15/2024)    TRANSPORTATION NEEDS     Transportation : Yes, it has kept me from medical appointments or from getting my medications.   Physical Activity: Inactive (10/19/2024)    Exercise Vital Sign     Days of Exercise per Week: 0 days     Minutes of Exercise per Session: 0 min   Stress: No Stress Concern Present (10/19/2024)    [employer] [place] [employer] - Occupational Stress Questionnaire     Feeling of Stress : Only a little   Housing Stability: Low Risk  (10/19/2024)    Housing Stability Vital Sign     Unable to Pay for Housing in the Last Year: No     Homeless in the Last Year: No   Recent Concern: Housing Stability - High Risk (10/15/2024)     Housing Stability Vital Sign     Unable to Pay for Housing in the Last Year: No     Homeless in the Last Year: Yes         OBJECTIVE:     Vital Signs:  Vitals:    11/06/24 1147   BP: 136/74   Pulse: 76   Resp: 18       Review of Systems   Constitutional: Negative.    HENT:  Positive for congestion.    Eyes: Negative.    Respiratory: Negative.  Negative for chest tightness.    Cardiovascular: Negative.  Negative for leg swelling.   Gastrointestinal: Negative.    Endocrine: Negative.    Genitourinary:  Positive for flank pain and pelvic pain.        Stress incontinence   Skin: Negative.    Allergic/Immunologic: Negative.    Neurological:  Positive for weakness.   Hematological: Negative.    Psychiatric/Behavioral: Negative.  Negative for agitation.    All other systems reviewed and are negative.      Physical Exam:  Physical Exam  Vitals reviewed.   Constitutional:       General: She is not in acute distress.     Appearance: Normal appearance. She is well-developed.   HENT:      Head: Normocephalic and atraumatic.      Nose: Nose normal.      Mouth/Throat:      Mouth: Mucous membranes are dry.      Pharynx: Oropharynx is clear.   Eyes:      Pupils: Pupils are equal, round, and reactive to light.   Cardiovascular:      Rate and Rhythm: Normal rate and regular rhythm.      Pulses: Normal pulses.      Heart sounds: Normal heart sounds.   Pulmonary:      Effort: Pulmonary effort is normal.      Breath sounds: Normal breath sounds.   Abdominal:      General: Bowel sounds are normal.      Palpations: Abdomen is soft.   Musculoskeletal:         General: Normal range of motion.      Cervical back: Normal range of motion and neck supple.   Skin:     General: Skin is warm and dry.   Neurological:      General: No focal deficit present.      Mental Status: She is alert and oriented to person, place, and time. Mental status is at baseline.      Motor: Weakness present.   Psychiatric:         Mood and Affect: Mood normal.          Behavior: Behavior normal.         Thought Content: Thought content normal.         Judgment: Judgment normal.         INSTRUCTIONS FOR PATIENT:   - Continue all medications, treatments and therapies as ordered.   - Follow all instructions, recommendations as discussed.  - Maintain Safety Precautions at all times.  - Attend all medical appointments as scheduled.  - For worsening symptoms: call Primary Care Physician or Nurse Practitioner.  - For emergencies, call 911 or immediately report to the nearest emergency room.   Scheduled Follow-up, Appts Reviewed with Modifications if Needed: Yes  Future Appointments   Date Time Provider Department Center   11/14/2024 10:00 AM [facility]   11/21/2024  9:00 AM Humza Saenz MD [facility] UROLOGY [facility]   12/2/2024  1:00 PM Ryder Nichols MD [facility]         Current Outpatient Medications:     ibuprofen (ADVIL,MOTRIN) 600 MG tablet, Take 1 tablet (600 mg total) by mouth every 6 (six) hours as needed., Disp: 30 tablet, Rfl: 0    nicotine (NICODERM CQ) 21 mg/24 hr, Place 1 patch onto the skin once daily., Disp: 28 patch, Rfl: 0    ondansetron (ZOFRAN-ODT) 4 MG TbDL, Take 1 tablet (4 mg total) by mouth every 6 (six) hours as needed (Nausea)., Disp: 28 tablet, Rfl: 0    oxybutynin (DITROPAN) 5 MG Tab, Take 1 tablet (5 mg total) by mouth 3 (three) times daily. for 7 days, Disp: 21 tablet, Rfl: 0    oxyCODONE-acetaminophen (PERCOCET) 7.5-325 mg per tablet, Take 1 tablet by mouth every 6 (six) hours as needed for Pain., Disp: 12 tablet, Rfl: 0    pantoprazole (PROTONIX) 40 MG tablet, Take 1 tablet (40 mg total) by mouth once daily., Disp: 30 tablet, Rfl: 0    simethicone (MYLICON) 40 mg/0.6 mL drops, Take 0.6 mLs (40 mg total) by mouth 4 (four) times daily as needed., Disp: 30 mL, Rfl: 0    tamsulosin (FLOMAX) 0.4 mg Cap, Take 1 capsule (0.4 mg total) by mouth every evening. for 30 doses, Disp: 30 capsule, Rfl: 0    Medication  Reconciliation:  Were medications changed during this appointment? No  Were medications in the home? Yes  Is the patient taking the medications as directed? Yes  Does the patient/caregiver understand the medications and changes? Yes  Does updated med list accurately reflects meds patient is currently taking? Yes    40 + minutes of total time spent on the encounter, which includes face to face time and non-face to face time preparing to see the patient (eg, review of tests), Obtaining and/or reviewing separately obtained history, documenting clinical information in the electronic or other health record, independently interpreting results (not separately reported) and communicating results to the patient/family/caregiver, or Care coordination (not separately reported).     Signature: Agusto Roldan NP

## 2024-11-08 NOTE — ASSESSMENT & PLAN NOTE
Resolved  Cr 1.1, eGFR 58  Renally dose meds, avoid nephrotoxins  Continue current plan  F/U with PCP

## 2024-11-08 NOTE — ASSESSMENT & PLAN NOTE
CT renal on 10/10 revealed mild prominence of the right renal collecting system/ureter secondary to a 5-6 mm calculus within the distal aspect of the right ureter, bilateral nonobstructive nephrolithiasis.   S/P cysto with lithotripsy and stent placement 10/14  CT renal on 10/15 revealed Previously seen distal right renal calculus on the 10/10/2024 CT is not definitely appreciated. Smaller adjoining punctate calcification/stone on the prior study is not definitely seen on the current study.   Continue current medications  F/U with urology

## 2024-11-20 ENCOUNTER — EXTERNAL HOME HEALTH (OUTPATIENT)
Dept: HOME HEALTH SERVICES | Facility: HOSPITAL | Age: 59
End: 2024-11-20
Payer: COMMERCIAL

## 2024-11-20 ENCOUNTER — TELEPHONE (OUTPATIENT)
Dept: UROLOGY | Facility: CLINIC | Age: 59
End: 2024-11-20
Payer: COMMERCIAL

## 2024-11-20 NOTE — TELEPHONE ENCOUNTER
Called and left  at [phone] and also [phone] indicating that we should postpone tomorrow's appt with Dr. Saenz until she has her imaging completed. I offered assistance in scheduling the US as well as rescheduling Dr. Saenz's appointment. Left my name and callback number. See "[employer] message.

## 2024-11-26 ENCOUNTER — TELEPHONE (OUTPATIENT)
Dept: UROLOGY | Facility: CLINIC | Age: 59
End: 2024-11-26
Payer: MEDICAID

## 2024-11-26 NOTE — TELEPHONE ENCOUNTER
Attempted to reach out to pt in regards to a referral we received due to kidney stones. Pt did not answer, voicemail left. Pt was scheduled accordingly. Pt can reach out to office if time and date does not work. Call back number left.

## 2024-12-02 ENCOUNTER — OFFICE VISIT (OUTPATIENT)
Dept: FAMILY MEDICINE | Facility: HOSPITAL | Age: 59
End: 2024-12-02
Attending: UROLOGY
Payer: MEDICAID

## 2024-12-02 ENCOUNTER — HOSPITAL ENCOUNTER (OUTPATIENT)
Dept: RADIOLOGY | Facility: HOSPITAL | Age: 59
Discharge: HOME | End: 2024-12-02
Attending: UROLOGY
Payer: MEDICAID

## 2024-12-02 VITALS
HEIGHT: 68 IN | WEIGHT: 194.44 LBS | OXYGEN SATURATION: 98 % | DIASTOLIC BLOOD PRESSURE: 83 MMHG | BODY MASS INDEX: 29.47 KG/M2 | TEMPERATURE: 98 F | HEART RATE: 81 BPM | SYSTOLIC BLOOD PRESSURE: 136 MMHG | RESPIRATION RATE: 18 BRPM

## 2024-12-02 DIAGNOSIS — R73.03 PRE-DIABETES: ICD-10-CM

## 2024-12-02 DIAGNOSIS — N89.8 VAGINAL CYST: ICD-10-CM

## 2024-12-02 DIAGNOSIS — N39.498 OTHER URINARY INCONTINENCE: ICD-10-CM

## 2024-12-02 DIAGNOSIS — L22 DIAPER RASH: ICD-10-CM

## 2024-12-02 DIAGNOSIS — N20.0 NEPHROLITHIASIS: Primary | Chronic | ICD-10-CM

## 2024-12-02 DIAGNOSIS — N20.1 LEFT URETERAL STONE: ICD-10-CM

## 2024-12-02 PROCEDURE — 99215 OFFICE O/P EST HI 40 MIN: CPT | Mod: 25

## 2024-12-02 PROCEDURE — 76770 US EXAM ABDO BACK WALL COMP: CPT | Mod: TC

## 2024-12-02 PROCEDURE — 76770 US EXAM ABDO BACK WALL COMP: CPT | Mod: 26,,, | Performed by: RADIOLOGY

## 2024-12-02 RX ORDER — NYSTATIN 100000 [USP'U]/G
POWDER TOPICAL 2 TIMES DAILY
Qty: 15 G | Refills: 0 | Status: SHIPPED | OUTPATIENT
Start: 2024-12-02

## 2024-12-02 RX ORDER — IBUPROFEN 800 MG/1
800 TABLET ORAL 2 TIMES DAILY PRN
Qty: 16 TABLET | Refills: 0 | Status: SHIPPED | OUTPATIENT
Start: 2024-12-02 | End: 2024-12-10
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
## 2024-12-03 NOTE — PROGRESS NOTES
Subjective:       Patient ID: Josselyn Tarango is a 59 y.o. female.    Chief Complaint: No chief complaint on file.     This is a 59 y.o.  female patient that is an established patient of mine.    Left 7mm distal ureteral stone, kidney stones on CT 6/2024, concern for UTI.    Left stent 6/7/24 then left URS/HLL/SBE/stent 6/17/24.    Stent removed 6/25/24.     Never follow up for ultrasound as ordered after left-sided ureteroscopy.    Was seen in the emergency department 07/2024 with right-sided flank pain, CT scan at that time showed multiple stones in the right distal ureter, she reportedly passed and with the stones.  No follow up after above ED visit.  Emergency department 10/10/2024 with a right-sided flank pain, CT scan with right 6 mm distal ureteral stone with hydronephrosis.  Right URS/HLL/SBE/stent 10/14/24--50% Calcium phosphate (apatite). 50% Magnesium ammonium phosphate (struvite).   Admitted with sepsis post procedure 10/15/24, CT with no right stones/hydro.    Some intermittent right sided pain, RIVKA 12/2/24 with no hydro or stones, renal cysts noted.    Having some urinary incontinence wearing pads, 2-3 ppd.  Started at [facility] years ago and now worsening incontinence without sensory awareness.    PVR 9 cc.  No fever or chills.            Lab Results   Component Value Date    CREATININE 1.1 10/18/2024       ---  PMH/PSH/Medications/Allergies/Social history reviewed and as in chart.    Review of Systems   Constitutional:  Negative for activity change, chills, fatigue and fever.   Respiratory:  Negative for cough, chest tightness and shortness of breath.    Cardiovascular:  Negative for chest pain.   Gastrointestinal:  Negative for abdominal distention, abdominal pain, nausea and vomiting.   Genitourinary:  Positive for flank pain. Negative for difficulty urinating, hematuria, pelvic pain and vaginal pain.   Musculoskeletal:  Negative for back pain and gait problem.       Objective:      Physical Exam  HENT:       Head: Normocephalic.   Pulmonary:      Effort: Pulmonary effort is normal.   Abdominal:      General: Abdomen is flat.   Musculoskeletal:      Cervical back: Normal range of motion.   Skin:     General: Skin is warm.   Neurological:      General: No focal deficit present.      Mental Status: She is alert.         Assessment:     Problem Noted   Left Ureteral Stone 6/7/2024    Left 7 mm distal ureteral stone, bilateral nonobstructing stones.  Left stent 6/7/24 then left URS/HLL/SBE/stent 6/17/24--50% Calcium phosphate (apatite). 50% Magnesium ammonium   phosphate (struvite).   Admitted sepsis after and CT with no hydro or stones 10/15/24  RIVKA 12/2/24: no hydro or stones, renal cysts bilaterally      Nephrolithiasis 6/7/2024    Bilateral non obstructing stones         Plan:     Discussed RIVKA without hydro or stones, doubt  source of pain  Will check culture, if pain worsens or persistent, any N/V/F/C recommend go to ED for evaluation.    Follow up in 6 weeks with NP to discuss incontinence, consider UDS vs OAB meds in future.  Will not start now given pending urine culture.     Humza Saenz MD    The above referenced imaging and interpretations were personally reviewed.  Disclaimer: This note has been generated using voice-recognition software. There may be typographical errors that have been missed during proof-reading.

## 2024-12-04 ENCOUNTER — OFFICE VISIT (OUTPATIENT)
Dept: UROLOGY | Facility: CLINIC | Age: 59
End: 2024-12-04
Payer: MEDICAID

## 2024-12-04 VITALS
SYSTOLIC BLOOD PRESSURE: 132 MMHG | HEIGHT: 68 IN | BODY MASS INDEX: 29.73 KG/M2 | WEIGHT: 196.19 LBS | DIASTOLIC BLOOD PRESSURE: 83 MMHG

## 2024-12-04 DIAGNOSIS — N20.1 LEFT URETERAL STONE: Primary | ICD-10-CM

## 2024-12-04 DIAGNOSIS — N39.0 RECURRENT UTI: ICD-10-CM

## 2024-12-04 DIAGNOSIS — N20.0 KIDNEY STONE ON RIGHT SIDE: ICD-10-CM

## 2024-12-04 LAB — POC RESIDUAL URINE VOLUME: 6 ML (ref 0–100)

## 2024-12-04 PROCEDURE — 1160F RVW MEDS BY RX/DR IN RCRD: CPT | Mod: CPTII,,, | Performed by: UROLOGY

## 2024-12-04 PROCEDURE — 87186 SC STD MICRODIL/AGAR DIL: CPT | Performed by: UROLOGY

## 2024-12-04 PROCEDURE — 87086 URINE CULTURE/COLONY COUNT: CPT | Performed by: UROLOGY

## 2024-12-04 PROCEDURE — 3079F DIAST BP 80-89 MM HG: CPT | Mod: CPTII,,, | Performed by: UROLOGY

## 2024-12-04 PROCEDURE — 3044F HG A1C LEVEL LT 7.0%: CPT | Mod: CPTII,,, | Performed by: UROLOGY

## 2024-12-04 PROCEDURE — 87088 URINE BACTERIA CULTURE: CPT | Performed by: UROLOGY

## 2024-12-04 PROCEDURE — 99213 OFFICE O/P EST LOW 20 MIN: CPT | Mod: PBBFAC,PO,25 | Performed by: UROLOGY

## 2024-12-04 PROCEDURE — 3008F BODY MASS INDEX DOCD: CPT | Mod: CPTII,,, | Performed by: UROLOGY

## 2024-12-04 PROCEDURE — 3075F SYST BP GE 130 - 139MM HG: CPT | Mod: CPTII,,, | Performed by: UROLOGY

## 2024-12-04 PROCEDURE — 1159F MED LIST DOCD IN RCRD: CPT | Mod: CPTII,,, | Performed by: UROLOGY

## 2024-12-04 PROCEDURE — 51798 US URINE CAPACITY MEASURE: CPT | Mod: PBBFAC,PO | Performed by: UROLOGY

## 2024-12-04 PROCEDURE — 99999 PR PBB SHADOW E&M-EST. PATIENT-LVL III: CPT | Mod: PBBFAC,,, | Performed by: UROLOGY

## 2024-12-04 PROCEDURE — 99214 OFFICE O/P EST MOD 30 MIN: CPT | Mod: S$PBB,,, | Performed by: UROLOGY

## 2024-12-04 PROCEDURE — 99999PBSHW POCT BLADDER SCAN: Mod: PBBFAC,,,

## 2024-12-05 ENCOUNTER — TELEPHONE (OUTPATIENT)
Dept: UROLOGY | Facility: CLINIC | Age: 59
End: 2024-12-05
Payer: MEDICAID

## 2024-12-05 DIAGNOSIS — N39.0 RECURRENT UTI: ICD-10-CM

## 2024-12-05 DIAGNOSIS — N20.0 KIDNEY STONE ON RIGHT SIDE: Primary | ICD-10-CM

## 2024-12-05 RX ORDER — NITROFURANTOIN 25; 75 MG/1; MG/1
100 CAPSULE ORAL 2 TIMES DAILY
Qty: 14 CAPSULE | Refills: 0 | Status: SHIPPED | OUTPATIENT
Start: 2024-12-05 | End: 2024-12-12

## 2024-12-05 NOTE — TELEPHONE ENCOUNTER
Spoke with pt and advised the following:  Culture positive, sending antibiotic to pharmacy on record.  Will let you know if antibiotic need to be cahnged based on final culture. Pt voiced her understanding

## 2024-12-05 NOTE — TELEPHONE ENCOUNTER
----- Message from Humza Saenz MD sent at 12/5/2024 10:36 AM CST -----  Culture positive, sending antibiotic to pharmacy on record.  Will let you know if antibiotic need to be cahnged based on final culture.    [Change in Activity] : no change in activity [Fever Above 102] : no fever [Rash] : no rash [Itching] : no itching [Sore Throat] : no sore throat [Nosebleeds] : no epistaxis [Wheezing] : no wheezing [Shortness of Breath] : no shortness of breath [Vomiting] : no vomiting [Muscle Aches] : no muscle aches [Sleep Disturbances] : ~T no sleep disturbances [Bruising] : no tendency for easy bruising

## 2024-12-07 LAB
BACTERIA UR CULT: ABNORMAL
BACTERIA UR CULT: ABNORMAL

## 2024-12-09 ENCOUNTER — OFFICE VISIT (OUTPATIENT)
Dept: FAMILY MEDICINE | Facility: HOSPITAL | Age: 59
End: 2024-12-09
Payer: MEDICAID

## 2024-12-09 VITALS
HEIGHT: 68 IN | BODY MASS INDEX: 29.8 KG/M2 | SYSTOLIC BLOOD PRESSURE: 137 MMHG | WEIGHT: 196.63 LBS | OXYGEN SATURATION: 97 % | HEART RATE: 84 BPM | DIASTOLIC BLOOD PRESSURE: 78 MMHG

## 2024-12-09 DIAGNOSIS — Z12.2 SCREENING FOR LUNG CANCER: ICD-10-CM

## 2024-12-09 DIAGNOSIS — N89.8 VAGINAL CYST: ICD-10-CM

## 2024-12-09 DIAGNOSIS — Z00.00 HEALTH CARE MAINTENANCE: ICD-10-CM

## 2024-12-09 DIAGNOSIS — Z87.442 HISTORY OF NEPHROLITHIASIS: ICD-10-CM

## 2024-12-09 DIAGNOSIS — Z12.31 SCREENING MAMMOGRAM FOR BREAST CANCER: ICD-10-CM

## 2024-12-09 DIAGNOSIS — Z12.11 SCREENING FOR MALIGNANT NEOPLASM OF COLON: ICD-10-CM

## 2024-12-09 DIAGNOSIS — H53.9 VISION DISORDER: Primary | ICD-10-CM

## 2024-12-09 DIAGNOSIS — Z23 IMMUNIZATION DUE: ICD-10-CM

## 2024-12-09 DIAGNOSIS — N20.0 NEPHROLITHIASIS: ICD-10-CM

## 2024-12-09 DIAGNOSIS — N39.498 OTHER URINARY INCONTINENCE: ICD-10-CM

## 2024-12-09 PROCEDURE — 99215 OFFICE O/P EST HI 40 MIN: CPT

## 2024-12-09 NOTE — PROGRESS NOTES
[facility] FAMILY PRACTICE CLINIC NOTE  Follow-up Visit      SUBJECTIVE:     Patient: Josselyn Tarango is a 59 y.o. female.    Chief Compliant:   Chief Complaint   Patient presents with    Follow-up       History of Present Illness:  59 y.o. female with a past medical history of prediabetes, diet controlled, nicotine dependence, ureteral lithiasis, s/p R ureteral stent placement on 10/14/24 and removal on 10/16/24; she present to our clinic using a walker for follow-up for preventive care.    She is currently still complaining of R flank pain that radiates to her RLQ reaching her groin however has improved since starting antibiotic (macrobid) for UTI (Culture positive for E. Coli and Cronobacter Sakazakii). No nausea or vomiting. No fevers. She admits to constipation. Uses diapers and pads. Unable to change them often as she cannot afford them and tries to use them as much as possible. Follows urology (Dr. Saenz). Last seen 12/04/24, discussed RIVKA without hydro or stones, doubtful  source of pain.     Additionally, she endorse new black spot in her R visual field that moves. She first noticed this 4 days ago. She endorse occasional headaches that improve with ibuprofen. No itching, tearing, pain, redness. No foreign body sensation. No recent eye trauma or suspected microtrauma (dust, sand, etc). She has not seen ophthalmology for 10+ years. She requires glasses but does not use them.     Lastly, she is requesting to address her gaps in her preventive care. Since she has not had any medical care for around 10+yrs.     Of note, needs transportation to get to appointments, currently uses Medicaid transportation services.     Review of Systems   Constitutional:  Negative for fever and malaise/fatigue.   HENT:  Negative for congestion, hearing loss and tinnitus.    Eyes:  Negative for blurred vision, pain, discharge and redness.        Black spot in R visual field that moves   Respiratory:  Negative for cough, shortness  "of breath and wheezing.    Cardiovascular:  Negative for chest pain, palpitations and leg swelling.   Gastrointestinal:  Positive for constipation. Negative for abdominal pain, blood in stool, diarrhea and nausea.   Genitourinary:  Negative for dysuria, flank pain, frequency and hematuria.   Musculoskeletal:  Positive for back pain (low back). Negative for neck pain.   Skin:  Negative for rash.   Neurological:  Positive for headaches. Negative for dizziness, tingling and weakness.   Endo/Heme/Allergies:  Negative for polydipsia.     A 10+ review of systems was performed with pertinent positives and negatives noted above in the history of present illness. Other systems were negative unless otherwise specified.    OBJECTIVE:     Vital Signs (Most Recent)  Vitals:    12/09/24 1356 12/09/24 2044   BP: (!) 167/90 137/78   Patient Position:  Sitting   Pulse: 84    SpO2: 97%    Weight: 89.2 kg (196 lb 10.4 oz)    Height: 5' 8" (1.727 m)      BMI: Body mass index is 29.9 kg/m².     Last A1c: Not Diabetic    Physical Exam  Constitutional:       Appearance: Normal appearance.   HENT:      Head: Normocephalic and atraumatic.      Nose: Nose normal. No congestion or rhinorrhea.      Mouth/Throat:      Mouth: Mucous membranes are moist.   Eyes:      General: No visual field deficit.        Right eye: No foreign body or discharge.      Extraocular Movements: Extraocular movements intact.      Right eye: Normal extraocular motion.      Pupils: Pupils are equal, round, and reactive to light.     Cardiovascular:      Rate and Rhythm: Normal rate and regular rhythm.      Pulses: Normal pulses.      Heart sounds: Normal heart sounds.   Pulmonary:      Effort: Pulmonary effort is normal. No respiratory distress.      Breath sounds: Normal breath sounds. No wheezing.   Abdominal:      General: Abdomen is flat. Bowel sounds are normal.      Palpations: Abdomen is soft.   Musculoskeletal:         General: Normal range of motion.      " Cervical back: Normal range of motion and neck supple. No tenderness.   Skin:     General: Skin is warm.      Capillary Refill: Capillary refill takes less than 2 seconds.   Neurological:      General: No focal deficit present.      Mental Status: She is alert and oriented to person, place, and time. Mental status is at baseline.      Cranial Nerves: No cranial nerve deficit.   Psychiatric:         Mood and Affect: Mood normal.         Behavior: Behavior normal.          ASSESSMENT:   Josselyn Tarango is a 59 y.o. female who presents to clinic to for    1. Vision disorder    2. Screening mammogram for breast cancer    3. Screening for lung cancer    4. Screening for malignant neoplasm of colon    5. Immunization due    6. History of nephrolithiasis    7. Health care maintenance    8. Vaginal cyst    9. Other urinary incontinence    10. Nephrolithiasis         PLAN:   History of nephrolithiasis  Stable. History of multiple stones since June of this year. Patient currently with pain however reduced after starting Macrobid for UTI. Discussed RIVKA without hydro or stones, doubtful  source of pain.  Follow-up appointment with urology (01/15/25)    Other urinary incontinence  Diaper rash  Stable. No rash seen during examination. Patient with frequent use of diapers/pads. History of recurrent UTIs.   Encouraged patient to maintain the area dry and to change diapers as much as possible.  Follow up 01/15/25 with urology to discuss incontinence, consider UDS vs OAB meds in future.  Continue with nystatin powder PRN     Vaginal cyst  Stable. Chronic. 20+ years present. Sinus tract likely but no limited to this cyst or abscess that may drain fluid intermittently. Has not seen OBGYN for 30yrs. Has not applied any lotions or creams the area. Encouraged patient to maintain the area dry and to change diapers as much as possible. Pending specialist visit Obstetrics / Gynecology. Referral previously sent.   Initial encounter  scheduled (01/22/25)    Breast cancer screening  Patient with 10+ yrs of no screening.   - Discussed risk of breast cancer with patient and educated patient on benefits/risks of screening method.  - Orders as below. Follow-up results with patient.   -     Mammo Digital Screening Bilat w/ Omid; Future; Expected date: 12/09/2024  Colorectal cancer screening   No past colon screening.   - Discussed risk of colon cancer with patient and educated patient on benefits/risks of various screenings methods.  - Orders as below. Follow-up results with patient.   -     Ambulatory referral/consult to Endo Procedure ; Future; Expected date: 12/10/2024    Lung cancer screening  The patient has a significant smoking history (40+ years, currently still smoking) and requires regular screening for lung cancer. Patient is agreeable on screening at this time.   Provide a new order for a lung scan and coordinate with the patient and insurance to ensure the scan is completed without further issues.  Educate the patient on the importance of regular lung cancer screening given her smoking history.  Schedule a follow-up appointment to review the results of the lung scan and discuss any further actions if needed.   -     CT Chest Lung Screening Low Dose; Future; Expected date: 12/09/2024    Immunization due  - Patient agreeable for flu shot, COVID vaccine, RSV, Shingrix and tdap. Sent physical orders. Patient will obtain vaccines at [facility] facility bellow clinic. Patient will contact us if she has any additional questions or concerns regarding vaccines.     Vision disorder  Stable. New. Requires prescription glasses. Unknown graduation. Right eye flashers/floaters. No red flags during evaluation, does not warrant urgent intervention. Given PAINLESS vision loss I have low suspicion for normally painful syndromes such as Corneal Abrasion/Ulcer, Complex Migraine, Globe Rupture, Acute Angle Glaucoma, Uveitis, Endopthalmitis, Iritis.   -      Ambulatory referral/consult to Ophthalmology; Future; Expected date: 12/16/2024  -     ED precautions provided to patient.     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   -     Lipid Panel; Future; Expected date: 12/09/2024  -     HIV 1/2 Ag/Ab (4th Gen); Future; Expected date: 12/09/2024  -     Hepatitis C Antibody; Future; Expected date: 12/09/2024  -     HEPATITIS B SURFACE ANTIGEN; Future; Expected date: 12/09/2024          Provided patient with anticipatory guidance and return precautions. Treatment plan discussed with patient, all questions answered, and patient acknowledged understanding and verbal agreement.      Follow-up in: 1 months; or sooner PRN if acute concerns arise.    The following information is provided to all patients.  This information is to help you find resources for any of the problems found today that may be affecting your health:                Living healthy guide: www.[facility].[place].[facility]       Understanding Diabetes: www.diabetes.org       Eating healthy: www.cdc.gov/healthyweight      [facility] home safety checklist: www.cdc.gov/steadi/patient.html      Agency on Aging: www.goea.[place].[facility]       Alcoholics anonymous (AA): www.aa.org      Physical Activity: www.antonia.nih.gov/ip7inbz       Tobacco use: www.quitwithusla.org       DISCLAIMER: This note was partially created using [employer] Voice Recognition software. Typographical and content errors may occur with this process. While efforts are made to detect and correct such errors, in some cases errors will persist. For this reason, wording in this document should be considered in the proper context and not strictly verbatim.    Case discussed with Dr. TOM Ncihols MD  [facility], PGY-1  12/09/2024

## 2024-12-10 PROBLEM — R73.03 PRE-DIABETES: Status: RESOLVED | Noted: 2024-10-15 | Resolved: 2024-12-10

## 2024-12-16 ENCOUNTER — CLINICAL SUPPORT (OUTPATIENT)
Dept: ENDOSCOPY | Facility: HOSPITAL | Age: 59
End: 2024-12-16
Payer: COMMERCIAL

## 2024-12-16 ENCOUNTER — TELEPHONE (OUTPATIENT)
Dept: ENDOSCOPY | Facility: HOSPITAL | Age: 59
End: 2024-12-16

## 2024-12-16 VITALS — WEIGHT: 196 LBS | BODY MASS INDEX: 29.7 KG/M2 | HEIGHT: 68 IN

## 2024-12-16 DIAGNOSIS — Z12.11 SCREENING FOR MALIGNANT NEOPLASM OF COLON: ICD-10-CM

## 2024-12-16 DIAGNOSIS — Z12.11 COLON CANCER SCREENING: ICD-10-CM

## 2024-12-16 DIAGNOSIS — Z12.11 SPECIAL SCREENING FOR MALIGNANT NEOPLASMS, COLON: Primary | ICD-10-CM

## 2024-12-16 RX ORDER — POLYETHYLENE GLYCOL 3350, SODIUM SULFATE ANHYDROUS, SODIUM BICARBONATE, SODIUM CHLORIDE, POTASSIUM CHLORIDE 236; 22.74; 6.74; 5.86; 2.97 G/4L; G/4L; G/4L; G/4L; G/4L
4 POWDER, FOR SOLUTION ORAL ONCE
Qty: 4000 ML | Refills: 0 | Status: SHIPPED | OUTPATIENT
Start: 2024-12-16 | End: 2024-12-16

## 2024-12-16 NOTE — TELEPHONE ENCOUNTER
Referral for procedure from PAT appointment      Spoke to pt to schedule procedure(s) Colonoscopy       Physician to perform procedure(s) Dr. TITUS Wallace  Date of Procedure (s) 02/06/25  Arrival Time 12:30 PM  Time of Procedure(s) 1:30 PM   Location of Procedure(s) [place] 4th Floor  Type of Rx Prep sent to patient: PEG  Instructions provided to patient via [facility]    Patient was informed on the following information and verbalized understanding. Screening questionnaire reviewed with patient and complete. If procedure requires anesthesia, a responsible adult needs to be present to accompany the patient home, patient cannot drive after receiving anesthesia. Appointment details are tentative, especially check-in time. Patient will receive a prep-op call 7 days prior to confirm check-in time for procedure. If applicable the patient should contact their pharmacy to verify Rx for procedure prep is ready for pick-up. Patient was advised to call the scheduling department at [phone] if pharmacy states no Rx is available. Patient was advised to call the endoscopy scheduling department if any questions or concerns arise.      [facility] Scheduling Department

## 2024-12-17 DIAGNOSIS — E78.5 HYPERLIPIDEMIA, UNSPECIFIED HYPERLIPIDEMIA TYPE: Primary | ICD-10-CM

## 2024-12-17 RX ORDER — ATORVASTATIN CALCIUM 40 MG/1
20 TABLET, FILM COATED ORAL DAILY
Qty: 45 TABLET | Refills: 3 | Status: SHIPPED | OUTPATIENT
Start: 2024-12-17 | End: 2025-12-17

## 2024-12-18 ENCOUNTER — PATIENT MESSAGE (OUTPATIENT)
Dept: FAMILY MEDICINE | Facility: HOSPITAL | Age: 59
End: 2024-12-18
Payer: COMMERCIAL

## 2024-12-23 ENCOUNTER — HOSPITAL ENCOUNTER (OUTPATIENT)
Dept: RADIOLOGY | Facility: HOSPITAL | Age: 59
Discharge: HOME | End: 2024-12-23
Payer: COMMERCIAL

## 2024-12-23 DIAGNOSIS — Z12.2 SCREENING FOR LUNG CANCER: ICD-10-CM

## 2024-12-23 DIAGNOSIS — Z12.31 SCREENING MAMMOGRAM FOR BREAST CANCER: ICD-10-CM

## 2024-12-23 PROCEDURE — 71271 CT THORAX LUNG CANCER SCR C-: CPT | Mod: 26,,, | Performed by: RADIOLOGY

## 2024-12-23 PROCEDURE — 77067 SCR MAMMO BI INCL CAD: CPT | Mod: TC

## 2024-12-23 PROCEDURE — 77063 BREAST TOMOSYNTHESIS BI: CPT | Mod: 26,,, | Performed by: RADIOLOGY

## 2024-12-23 PROCEDURE — 71271 CT THORAX LUNG CANCER SCR C-: CPT | Mod: TC

## 2024-12-23 PROCEDURE — 77067 SCR MAMMO BI INCL CAD: CPT | Mod: 26,,, | Performed by: RADIOLOGY

## 2025-01-23 ENCOUNTER — TELEPHONE (OUTPATIENT)
Dept: GASTROENTEROLOGY | Facility: CLINIC | Age: 60
End: 2025-01-23
Payer: MEDICAID

## 2025-01-23 NOTE — TELEPHONE ENCOUNTER
----- Message from Radha sent at 1/23/2025 11:12 AM CST -----  Regarding: Arrival time  Contact: [phone]  Hi,    Who called: The pt       Reason:Pt needing to get the arrival time to schedule her transportation. Pls call her at [phone]      Provider's name:Dr. Cuellar      Additional Information: Thank you.

## 2025-01-24 ENCOUNTER — LAB VISIT (OUTPATIENT)
Dept: LAB | Facility: HOSPITAL | Age: 60
End: 2025-01-24
Payer: MEDICAID

## 2025-01-24 ENCOUNTER — OFFICE VISIT (OUTPATIENT)
Dept: UROLOGY | Facility: CLINIC | Age: 60
End: 2025-01-24
Payer: MEDICAID

## 2025-01-24 VITALS
BODY MASS INDEX: 29.67 KG/M2 | HEIGHT: 68 IN | HEART RATE: 92 BPM | DIASTOLIC BLOOD PRESSURE: 92 MMHG | SYSTOLIC BLOOD PRESSURE: 144 MMHG | WEIGHT: 195.75 LBS

## 2025-01-24 DIAGNOSIS — R82.90 ABNORMAL URINE ODOR: ICD-10-CM

## 2025-01-24 DIAGNOSIS — R10.9 ABDOMINAL PAIN, UNSPECIFIED ABDOMINAL LOCATION: ICD-10-CM

## 2025-01-24 DIAGNOSIS — R10.31 RIGHT LOWER QUADRANT ABDOMINAL PAIN: ICD-10-CM

## 2025-01-24 DIAGNOSIS — R30.0 DYSURIA: ICD-10-CM

## 2025-01-24 DIAGNOSIS — R10.2 SUPRAPUBIC PAIN: ICD-10-CM

## 2025-01-24 DIAGNOSIS — R10.9 BILATERAL FLANK PAIN: Primary | ICD-10-CM

## 2025-01-24 DIAGNOSIS — L05.91 PILONIDAL CYST: ICD-10-CM

## 2025-01-24 DIAGNOSIS — R10.9 BILATERAL FLANK PAIN: ICD-10-CM

## 2025-01-24 LAB
ALBUMIN SERPL BCP-MCNC: 3.6 G/DL (ref 3.5–5.2)
ALP SERPL-CCNC: 113 U/L (ref 40–150)
ALT SERPL W/O P-5'-P-CCNC: 9 U/L (ref 10–44)
ANION GAP SERPL CALC-SCNC: 11 MMOL/L (ref 8–16)
AST SERPL-CCNC: 12 U/L (ref 10–40)
BILIRUB SERPL-MCNC: 0.3 MG/DL (ref 0.1–1)
BILIRUB SERPL-MCNC: ABNORMAL MG/DL
BLOOD URINE, POC: ABNORMAL
BUN SERPL-MCNC: 12 MG/DL (ref 6–20)
CALCIUM SERPL-MCNC: 9.2 MG/DL (ref 8.7–10.5)
CHLORIDE SERPL-SCNC: 107 MMOL/L (ref 95–110)
CLARITY, POC UA: ABNORMAL
CO2 SERPL-SCNC: 23 MMOL/L (ref 23–29)
COLOR, POC UA: YELLOW
CREAT SERPL-MCNC: 1.2 MG/DL (ref 0.5–1.4)
EST. GFR  (NO RACE VARIABLE): 52 ML/MIN/1.73 M^2
GLUCOSE SERPL-MCNC: 107 MG/DL (ref 70–110)
GLUCOSE UR QL STRIP: ABNORMAL
KETONES UR QL STRIP: ABNORMAL
LEUKOCYTE ESTERASE URINE, POC: ABNORMAL
NITRITE, POC UA: ABNORMAL
PH, POC UA: 6
POTASSIUM SERPL-SCNC: 3.6 MMOL/L (ref 3.5–5.1)
PROT SERPL-MCNC: 7.3 G/DL (ref 6–8.4)
PROTEIN, POC: ABNORMAL
SODIUM SERPL-SCNC: 141 MMOL/L (ref 136–145)
SPECIFIC GRAVITY, POC UA: 1.01
UROBILINOGEN, POC UA: ABNORMAL

## 2025-01-24 PROCEDURE — 3008F BODY MASS INDEX DOCD: CPT | Mod: CPTII,,,

## 2025-01-24 PROCEDURE — 81002 URINALYSIS NONAUTO W/O SCOPE: CPT | Mod: PBBFAC,PO

## 2025-01-24 PROCEDURE — 99999 PR PBB SHADOW E&M-EST. PATIENT-LVL IV: CPT | Mod: PBBFAC,,,

## 2025-01-24 PROCEDURE — 3080F DIAST BP >= 90 MM HG: CPT | Mod: CPTII,,,

## 2025-01-24 PROCEDURE — 3077F SYST BP >= 140 MM HG: CPT | Mod: CPTII,,,

## 2025-01-24 PROCEDURE — 36415 COLL VENOUS BLD VENIPUNCTURE: CPT

## 2025-01-24 PROCEDURE — 1159F MED LIST DOCD IN RCRD: CPT | Mod: CPTII,,,

## 2025-01-24 PROCEDURE — 99214 OFFICE O/P EST MOD 30 MIN: CPT | Mod: PBBFAC,PO

## 2025-01-24 PROCEDURE — 99999PBSHW POCT URINE DIPSTICK WITHOUT MICROSCOPE: Mod: PBBFAC,,,

## 2025-01-24 PROCEDURE — 80053 COMPREHEN METABOLIC PANEL: CPT

## 2025-01-24 PROCEDURE — 87086 URINE CULTURE/COLONY COUNT: CPT

## 2025-01-24 PROCEDURE — 99214 OFFICE O/P EST MOD 30 MIN: CPT | Mod: S$PBB,,,

## 2025-01-24 PROCEDURE — 1160F RVW MEDS BY RX/DR IN RCRD: CPT | Mod: CPTII,,,

## 2025-01-24 RX ORDER — GUAIFENESIN 1200 MG
650 TABLET, EXTENDED RELEASE 12 HR ORAL
COMMUNITY

## 2025-01-24 RX ORDER — LEVOFLOXACIN 500 MG/1
500 TABLET, FILM COATED ORAL DAILY
Qty: 5 TABLET | Refills: 0 | Status: SHIPPED | OUTPATIENT
Start: 2025-01-24 | End: 2025-01-29

## 2025-01-24 RX ORDER — PREDNISOLONE ACETATE 10 MG/ML
SUSPENSION/ DROPS OPHTHALMIC
COMMUNITY
Start: 2025-01-09

## 2025-01-24 RX ORDER — ASPIRIN 325 MG
650 TABLET ORAL
COMMUNITY

## 2025-01-24 NOTE — PROGRESS NOTES
Subjective:       Patient ID: Josselyn Tarango is a 59 y.o. female.    Chief Complaint: f/u      This is a 59 y.o.  female patient that is new to me but not new to the system.  This is a past patient of Dr. Saenz with history of kidney stones.  Dr. Saenz Summary:  Left 7mm distal ureteral stone, kidney stones on CT 6/2024, concern for UTI.    Left stent 6/7/24 then left URS/HLL/SBE/stent 6/17/24.    Stent removed 6/25/24.   Emergency department 07/2024 with right-sided flank pain, CT scan at that time showed multiple stones in the right distal ureter, she reportedly passed and with the stones.  Emergency department 10/10/2024 with a right-sided flank pain, CT scan with right 6 mm distal ureteral stone with hydronephrosis.  Right URS/HLL/SBE/stent 10/14/24--50% Calcium phosphate (apatite). 50% Magnesium ammonium phosphate (struvite).   Admitted with sepsis post procedure 10/15/24, CT with no right stones/hydro.  RIVKA 12/2/24 with no hydro or stones, renal cysts noted.   Having some urinary incontinence wearing pads, 2-3 ppd.  Started at [facility] years ago and now worsening incontinence without sensory awareness.    PVR 9 cc.   Urine culture 12/4/24- positive for E. Coli and cronobacter, treated with macrobid.    Here today for possible UTI. Reports UTI symptoms for about 2 weeks. Reports foul odor of urine, dysuria. Reports UUI which got better after last UTI. Reports using 2-3 ppd. Reports suprapubic pain and bilateral flank pain that is worse on the left. Denies n/v, fevers, chills. Reports drinking at least 64oz of water per day and 1 12oz soda per day.   Reports wearing the same pad as long as possible due to the expense of pads.  Reports using hospital wipes to clean vaginal area. Reports taking baths sometimes, uses sensitive soap.  Also reports history of pilonidal cyst and the cyst still drains at times and sees the drainage on her underwear.  Urine dipstick- positive for Leukocytes, negative for RBC, and  Nitrites.       Lab Results   Component Value Date    CREATININE 1.1 10/18/2024       ---  PMH/PSH/Medications/Allergies/Social history reviewed and as in chart.    Review of Systems   Constitutional:  Negative for chills and fever.   Respiratory:  Negative for shortness of breath.    Cardiovascular:  Negative for chest pain and palpitations.   Gastrointestinal:  Negative for abdominal pain, constipation and diarrhea.   Genitourinary:  Positive for dysuria, flank pain and pelvic pain. Negative for difficulty urinating, frequency, hematuria, urgency and vaginal pain.   Neurological:  Negative for dizziness and weakness.   Psychiatric/Behavioral:  Negative for agitation, confusion and sleep disturbance.        Objective:      Physical Exam  HENT:      Head: Normocephalic.   Pulmonary:      Effort: Pulmonary effort is normal.   Musculoskeletal:         General: Normal range of motion.      Cervical back: Normal range of motion.   Skin:     General: Skin is warm and dry.   Neurological:      Mental Status: She is alert and oriented to person, place, and time.         Assessment:     Problem Noted   Bilateral Flank Pain 1/24/2025   Abnormal Urine Odor 1/24/2025   Suprapubic Pain 1/24/2025   Dysuria 1/24/2025       Plan:     CT renal stone ordered with CMP prior.  Rx sent for levofloxacin 500mg daily x5 days.  Urine culture sent, will change antibiotics if needed  Referral placed to colorectal to evaluate pilonidal cyst.  Discussed precautions and prevention of UTIs-- only take showers, increase water intake, limit sodas, unscented wipes or toilet paper for cleaning, changing pads as soon as saturated.  Follow-up pending culture and CT results.    TAMMY Ram    I spent a total of 30 minutes on the day of the visit.This includes face to face time and non-face to face time preparing to see the patient (eg, review of tests), obtaining and/or reviewing separately obtained history, documenting clinical information in  the electronic or other health record, independently interpreting results and communicating results to the patient/family/caregiver, or care coordinator.

## 2025-01-26 LAB
BACTERIA UR CULT: NORMAL
BACTERIA UR CULT: NORMAL

## 2025-01-27 ENCOUNTER — TELEPHONE (OUTPATIENT)
Dept: UROLOGY | Facility: CLINIC | Age: 60
End: 2025-01-27
Payer: MEDICAID

## 2025-01-27 ENCOUNTER — PATIENT MESSAGE (OUTPATIENT)
Dept: UROLOGY | Facility: CLINIC | Age: 60
End: 2025-01-27
Payer: COMMERCIAL

## 2025-01-27 NOTE — TELEPHONE ENCOUNTER
Left message to read about the results on the portal and if she any question or concerns you can call the office.

## 2025-01-27 NOTE — TELEPHONE ENCOUNTER
----- Message from TAMMY Ram sent at 1/27/2025  8:27 AM CST -----  Urine culture likely contaminated. If she is still having symptoms we can schedule her for a straight cath urine sample.   Thanks,  Tri

## 2025-01-29 ENCOUNTER — PATIENT MESSAGE (OUTPATIENT)
Dept: UROLOGY | Facility: CLINIC | Age: 60
End: 2025-01-29
Payer: COMMERCIAL

## 2025-01-29 ENCOUNTER — HOSPITAL ENCOUNTER (OUTPATIENT)
Dept: RADIOLOGY | Facility: HOSPITAL | Age: 60
Discharge: HOME | End: 2025-01-29
Payer: MEDICAID

## 2025-01-29 DIAGNOSIS — R10.9 ABDOMINAL PAIN, UNSPECIFIED ABDOMINAL LOCATION: ICD-10-CM

## 2025-01-29 PROCEDURE — 74176 CT ABD & PELVIS W/O CONTRAST: CPT | Mod: TC,PN

## 2025-01-29 PROCEDURE — 74176 CT ABD & PELVIS W/O CONTRAST: CPT | Mod: 26,,, | Performed by: RADIOLOGY

## 2025-02-03 ENCOUNTER — TELEPHONE (OUTPATIENT)
Dept: ENDOSCOPY | Facility: HOSPITAL | Age: 60
End: 2025-02-03
Payer: COMMERCIAL

## 2025-02-03 NOTE — TELEPHONE ENCOUNTER
Patient called in re: colonoscopy on 02/06/25. Instructions reviewed with patient and resent via portal. Patient verbalized understanding.

## 2025-02-04 NOTE — PROGRESS NOTES
Subjective:       Patient ID: Josselyn Tarango is a 59 y.o. female.    Chief Complaint: f/u      This is a 59 y.o.  female patient that is new to me but not new to the system.  This is a past patient of Dr. Saenz with history of kidney stones.  Dr. Saenz Summary:  Left 7mm distal ureteral stone, kidney stones on CT 6/2024, concern for UTI.    Left stent 6/7/24 then left URS/HLL/SBE/stent 6/17/24.    Stent removed 6/25/24.   Emergency department 07/2024 with right-sided flank pain, CT scan at that time showed multiple stones in the right distal ureter, she reportedly passed and with the stones.  Emergency department 10/10/2024 with a right-sided flank pain, CT scan with right 6 mm distal ureteral stone with hydronephrosis.  Right URS/HLL/SBE/stent 10/14/24--50% Calcium phosphate (apatite). 50% Magnesium ammonium phosphate (struvite).   Admitted with sepsis post procedure 10/15/24, CT with no right stones/hydro.  RIVKA 12/2/24 with no hydro or stones, renal cysts noted.   Having some urinary incontinence wearing pads, 2-3 ppd.  Started at [facility] years ago and now worsening incontinence without sensory awareness.    PVR 9 cc.   Urine culture 12/4/24- positive for E. Coli and cronobacter, treated with macrobid.    1/24/25-- Establishing care with me. Reports possible UTI. Reports UTI symptoms for about 2 weeks. Reports foul odor of urine, dysuria. Reports UUI which got better after last UTI. Reports using 2-3 ppd. Reports suprapubic pain and bilateral flank pain that is worse on the left. Denies n/v, fevers, chills. Reports drinking at least 64oz of water per day and 1 12oz soda per day.   Reports wearing the same pad as long as possible due to the expense of pads.  Reports using hospital wipes to clean vaginal area. Reports taking baths sometimes, uses sensitive soap.  Also reports history of pilonidal cyst and the cyst still drains at times and sees the drainage on her underwear.  Urine culture- multiple organisms,  possible contamination    2/5/24-- Here today for continues urinary frequency, suprapubic pain, incontinence. Reports that colorectal has not contacted her for pilonidal cyst.   CT renal scan-- showed no renal stones or hydronephrosis. Right kidney with cyst 5.3cm.  Denies dysuria, gross hematuria, fevers, chills.       Lab Results   Component Value Date    CREATININE 1.2 01/24/2025       ---  PMH/PSH/Medications/Allergies/Social history reviewed and as in chart.    Review of Systems   Constitutional:  Negative for chills and fever.   Respiratory:  Negative for shortness of breath.    Cardiovascular:  Negative for chest pain and palpitations.   Gastrointestinal:  Negative for abdominal pain, constipation and diarrhea.   Genitourinary:  Positive for pelvic pain. Negative for difficulty urinating, dysuria, flank pain, frequency, hematuria, urgency and vaginal pain.        Incontinence   Neurological:  Negative for dizziness and weakness.   Psychiatric/Behavioral:  Negative for agitation, confusion and sleep disturbance.        Objective:      Physical Exam  HENT:      Head: Normocephalic.   Pulmonary:      Effort: Pulmonary effort is normal.   Genitourinary:     Urethra: Prolapse present.      Comments: Urethra cleaned with betadine pain, urine specimen collected using sterile technique and straight catheterization.    Cystocele noted while obtaining urine specimen.    Possible pilonidal cyst located at the sacrum above the anus.  Musculoskeletal:         General: Normal range of motion.      Cervical back: Normal range of motion.   Skin:     General: Skin is warm and dry.   Neurological:      Mental Status: She is alert and oriented to person, place, and time.         Assessment:     Problem Noted   Urinary Frequency 2/5/2025   Pilonidal Cyst 2/5/2025   Cystocele With Prolapse 2/5/2025   Bilateral Flank Pain 1/24/2025   Abnormal Urine Odor 1/24/2025   Suprapubic Pain 1/24/2025   Dysuria 1/24/2025       Plan:      Discussed UTI vs OAB, incontinence due to cystocele. Will start ppx antibiotic, if culture negative will consider starting a beta-3 agonist vs anticholinergic to help with symptoms.  Rx sent for macrobid 100mg BID x5 days.  Urine culture sent, will change antibiotics if needed  Referral placed to colorectal to evaluate pilonidal cyst.  Referral placed to uro/gyn for cystocele evaluation.  Discussed precautions and prevention of UTIs-- only take showers, increase water intake, limit sodas, unscented wipes or toilet paper for cleaning, changing pads as soon as saturated.  Follow-up pending culture results and recs from uro/gyn.    TAMMY Ram    I spent a total of 30 minutes on the day of the visit.This includes face to face time and non-face to face time preparing to see the patient (eg, review of tests), obtaining and/or reviewing separately obtained history, documenting clinical information in the electronic or other health record, independently interpreting results and communicating results to the patient/family/caregiver, or care coordinator.

## 2025-02-05 ENCOUNTER — OFFICE VISIT (OUTPATIENT)
Dept: UROLOGY | Facility: CLINIC | Age: 60
End: 2025-02-05
Payer: COMMERCIAL

## 2025-02-05 VITALS
SYSTOLIC BLOOD PRESSURE: 118 MMHG | BODY MASS INDEX: 29.34 KG/M2 | DIASTOLIC BLOOD PRESSURE: 76 MMHG | HEART RATE: 91 BPM | HEIGHT: 68 IN | WEIGHT: 193.56 LBS

## 2025-02-05 DIAGNOSIS — N81.4 CYSTOCELE WITH PROLAPSE: ICD-10-CM

## 2025-02-05 DIAGNOSIS — R35.0 URINARY FREQUENCY: ICD-10-CM

## 2025-02-05 DIAGNOSIS — R10.2 SUPRAPUBIC PAIN: ICD-10-CM

## 2025-02-05 DIAGNOSIS — R82.90 ABNORMAL URINE ODOR: Primary | ICD-10-CM

## 2025-02-05 DIAGNOSIS — L05.91 PILONIDAL CYST: ICD-10-CM

## 2025-02-05 PROBLEM — R10.9 BILATERAL FLANK PAIN: Status: RESOLVED | Noted: 2025-01-24 | Resolved: 2025-02-05

## 2025-02-05 PROCEDURE — 99213 OFFICE O/P EST LOW 20 MIN: CPT | Mod: PBBFAC,PO

## 2025-02-05 PROCEDURE — 87088 URINE BACTERIA CULTURE: CPT

## 2025-02-05 PROCEDURE — 87086 URINE CULTURE/COLONY COUNT: CPT

## 2025-02-05 PROCEDURE — 99999 PR PBB SHADOW E&M-EST. PATIENT-LVL III: CPT | Mod: PBBFAC,,,

## 2025-02-05 RX ORDER — PHENAZOPYRIDINE HYDROCHLORIDE 200 MG/1
200 TABLET, FILM COATED ORAL 3 TIMES DAILY PRN
Qty: 9 TABLET | Refills: 0 | Status: SHIPPED | OUTPATIENT
Start: 2025-02-05 | End: 2025-02-08

## 2025-02-05 RX ORDER — NITROFURANTOIN 25; 75 MG/1; MG/1
100 CAPSULE ORAL 2 TIMES DAILY
Qty: 10 CAPSULE | Refills: 0 | Status: SHIPPED | OUTPATIENT
Start: 2025-02-05 | End: 2025-02-10

## 2025-02-06 ENCOUNTER — HOSPITAL ENCOUNTER (OUTPATIENT)
Facility: HOSPITAL | Age: 60
Discharge: HOME | End: 2025-02-06
Attending: INTERNAL MEDICINE | Admitting: INTERNAL MEDICINE
Payer: MEDICAID

## 2025-02-06 ENCOUNTER — ANESTHESIA (OUTPATIENT)
Dept: ENDOSCOPY | Facility: HOSPITAL | Age: 60
End: 2025-02-06
Payer: COMMERCIAL

## 2025-02-06 ENCOUNTER — ANESTHESIA EVENT (OUTPATIENT)
Dept: ENDOSCOPY | Facility: HOSPITAL | Age: 60
End: 2025-02-06
Payer: MEDICAID

## 2025-02-06 VITALS
HEART RATE: 74 BPM | SYSTOLIC BLOOD PRESSURE: 114 MMHG | DIASTOLIC BLOOD PRESSURE: 64 MMHG | HEIGHT: 68 IN | RESPIRATION RATE: 18 BRPM | BODY MASS INDEX: 29.25 KG/M2 | TEMPERATURE: 98 F | WEIGHT: 193 LBS | OXYGEN SATURATION: 98 %

## 2025-02-06 DIAGNOSIS — R16.0 HYPODENSE MASS OF LIVER: Primary | ICD-10-CM

## 2025-02-06 DIAGNOSIS — Z12.11 SCREEN FOR COLON CANCER: ICD-10-CM

## 2025-02-06 DIAGNOSIS — R19.00 INTRA-ABDOMINAL AND PELVIC SWELLING, MASS AND LUMP, UNSPECIFIED SITE: ICD-10-CM

## 2025-02-06 PROCEDURE — 45385 COLONOSCOPY W/LESION REMOVAL: CPT | Performed by: INTERNAL MEDICINE

## 2025-02-06 PROCEDURE — 27201089 HC SNARE, DISP (ANY): Performed by: INTERNAL MEDICINE

## 2025-02-06 PROCEDURE — 45385 COLONOSCOPY W/LESION REMOVAL: CPT | Mod: ,,, | Performed by: INTERNAL MEDICINE

## 2025-02-06 PROCEDURE — 45381 COLONOSCOPY SUBMUCOUS NJX: CPT | Mod: 51,,, | Performed by: INTERNAL MEDICINE

## 2025-02-06 PROCEDURE — 88305 TISSUE EXAM BY PATHOLOGIST: CPT | Performed by: PATHOLOGY

## 2025-02-06 PROCEDURE — 88305 TISSUE EXAM BY PATHOLOGIST: CPT | Mod: 26,,, | Performed by: PATHOLOGY

## 2025-02-06 PROCEDURE — 45381 COLONOSCOPY SUBMUCOUS NJX: CPT | Performed by: INTERNAL MEDICINE

## 2025-02-06 PROCEDURE — 27200997: Performed by: INTERNAL MEDICINE

## 2025-02-06 PROCEDURE — 63600175 PHARM REV CODE 636 W HCPCS

## 2025-02-06 PROCEDURE — 27201028 HC NEEDLE, SCLERO: Performed by: INTERNAL MEDICINE

## 2025-02-06 PROCEDURE — E9220 PRA ENDO ANESTHESIA: HCPCS | Mod: ,,,

## 2025-02-06 PROCEDURE — 37000008 HC ANESTHESIA 1ST 15 MINUTES: Performed by: INTERNAL MEDICINE

## 2025-02-06 PROCEDURE — 37000009 HC ANESTHESIA EA ADD 15 MINS: Performed by: INTERNAL MEDICINE

## 2025-02-06 RX ORDER — LIDOCAINE HYDROCHLORIDE 20 MG/ML
INJECTION INTRAVENOUS
Status: DISCONTINUED | OUTPATIENT
Start: 2025-02-06 | End: 2025-02-06

## 2025-02-06 RX ORDER — SODIUM CHLORIDE 9 MG/ML
INJECTION, SOLUTION INTRAVENOUS CONTINUOUS
Status: DISCONTINUED | OUTPATIENT
Start: 2025-02-06 | End: 2025-02-06 | Stop reason: HOSPADM

## 2025-02-06 RX ORDER — PROPOFOL 10 MG/ML
VIAL (ML) INTRAVENOUS CONTINUOUS PRN
Status: DISCONTINUED | OUTPATIENT
Start: 2025-02-06 | End: 2025-02-06

## 2025-02-06 RX ADMIN — PROPOFOL 70 MG: 10 INJECTION, EMULSION INTRAVENOUS at 01:02

## 2025-02-06 RX ADMIN — LIDOCAINE HYDROCHLORIDE 100 MG: 20 INJECTION INTRAVENOUS at 01:02

## 2025-02-06 RX ADMIN — PROPOFOL 200 MCG/KG/MIN: 10 INJECTION, EMULSION INTRAVENOUS at 01:02

## 2025-02-06 NOTE — ANESTHESIA PREPROCEDURE EVALUATION
02/06/2025  Josselyn Tarango is a 59 y.o., female.      Pre-op Assessment    I have reviewed the Patient Summary Reports.     I have reviewed the Nursing Notes. I have reviewed the NPO Status.   I have reviewed the Medications.     Review of Systems  Anesthesia Hx:  No problems with previous Anesthesia                Social:  Social Alcohol Use, Non-Smoker       Cardiovascular:                   ECG has been reviewed.                            Renal/:  Chronic Renal Disease        Kidney Function/Disease             Hepatic/GI:  Bowel Prep.                   Endocrine:  Diabetes    Diabetes                          Physical Exam  General: Well nourished, Cooperative, Alert and Oriented    Airway:  Mallampati: II / I  Mouth Opening: Normal  TM Distance: Normal  Tongue: Normal  Neck ROM: Normal ROM    Dental:  Intact    Chest/Lungs:  Clear to auscultation, Normal Respiratory Rate    Heart:  Rate: Normal  Rhythm: Regular Rhythm  Sounds: Normal        Anesthesia Plan  Type of Anesthesia, risks & benefits discussed:    Anesthesia Type: Gen Natural Airway  Intra-op Monitoring Plan: Standard ASA Monitors  Induction:  IV  Informed Consent: Informed consent signed with the Patient and all parties understand the risks and agree with anesthesia plan.  All questions answered.   ASA Score: 2    Ready For Surgery From Anesthesia Perspective.     .

## 2025-02-06 NOTE — H&P
[facility] 4[facility]  History & Physical    Subjective:      Chief Complaint/Reason for Admission:     Direct access screening colonoscopy from patient's PCP average risk for CRC by personal and family history today.    Josselyn Tarango is a 59 y.o. female.    Past Medical History:   Diagnosis Date    Allergy     Bilateral flank pain 01/24/2025    Diabetes mellitus     diet control    Digestive disorder     Hx: gastric ulcer    Kidney stone     Urinary tract infection      Past Surgical History:   Procedure Laterality Date    CYSTOSCOPY W/ URETERAL STENT PLACEMENT Left 6/7/2024    Procedure: CYSTOSCOPY, left retrograde pyelogram, left JJ stent;  Surgeon: Humza Saenz MD;  Location: [facility] [place];  Service: Urology;  Laterality: Left;  MAC vs choice    CYSTOURETEROSCOPY, WITH HOLMIUM LASER LITHOTRIPSY OF URETERAL CALCULUS AND STENT INSERTION Left 6/17/2024    Procedure: Cystoscopy, left retrograde pyelogram, left ureteroscopy with holmium laser lithotripsy, stone basket extraction, left JJ stent;  Surgeon: Humza Saenz MD;  Location: [facility], [place];  Service: Urology;  Laterality: Left;  Element    CYSTOURETEROSCOPY, WITH HOLMIUM LASER LITHOTRIPSY OF URETERAL CALCULUS AND STENT INSERTION Right 10/14/2024    Procedure: CYSTOURETEROSCOPY, WITH HOLMIUM LASER LITHOTRIPSY OF URETERAL CALCULUS AND STENT INSERTION;  Surgeon: Humza Saenz MD;  Location: [facility] OR;  Service: Urology;  Laterality: Right;  LMA    EXTRACTION - STONE Left 6/17/2024    Procedure: EXTRACTION - STONE;  Surgeon: Humza Saenz MD;  Location: [facility] OR;  Service: Urology;  Laterality: Left;    RETROGRADE PYELOGRAPHY N/A 6/7/2024    Procedure: PYELOGRAM, RETROGRADE;  Surgeon: Humza Saenz MD;  Location: [facility] OR;  Service: Urology;  Laterality: N/A;    RETROGRADE PYELOGRAPHY N/A 6/17/2024    Procedure: PYELOGRAM, RETROGRADE;  Surgeon: Humza Saenz MD;  Location: [facility] OR;  Service: Urology;  Laterality: N/A;     RETROGRADE PYELOGRAPHY  10/14/2024    Procedure: PYELOGRAM, RETROGRADE;  Surgeon: Humza Saenz MD;  Location: [facility] OR;  Service: Urology;;     Social History     Tobacco Use    Smoking status: Every Day     Current packs/day: 1.00     Average packs/day: 1 pack/day for 45.1 years (45.1 ttl pk-yrs)     Types: Cigarettes     Start date:      Passive exposure: Current    Smokeless tobacco: Never    Tobacco comments:     Smoking cessation education note: Pt is a 1 pk/day cigarette smoker x 45 yrs. 21 mg nicotine patch ordered Q day. Pt states not ready to quit. Handout provided.    Substance Use Topics    Alcohol use: Yes    Drug use: Never       PTA Medications   Medication Sig    acetaminophen 325 mg Cap Take 650 mg by mouth.    aspirin 325 MG tablet Take 650 mg by mouth.    atorvastatin (LIPITOR) 40 MG tablet Take 0.5 tablets (20 mg total) by mouth once daily.    incontinence pad, liner, disp Pads 1 Bag by Misc.(Non-Drug; Combo Route) route every 14 (fourteen) days.    nitrofurantoin, macrocrystal-monohydrate, (MACROBID) 100 MG capsule Take 1 capsule (100 mg total) by mouth 2 (two) times daily. for 5 days    nystatin (MYCOSTATIN) powder Apply topically 2 (two) times daily.    phenazopyridine (PYRIDIUM) 200 MG tablet Take 1 tablet (200 mg total) by mouth 3 (three) times daily as needed for Pain (Pain with urination).    prednisoLONE acetate (PRED FORTE) 1 % DrpS SMARTSI Drop(s) In Eye(s) 6 Times Daily     Review of patient's allergies indicates:   Allergen Reactions    Sulfa (sulfonamide antibiotics)         Review of Systems   Constitutional:  Negative for fever.       Objective:      Vital Signs (Most Recent)  Temp: 97.9 °F (36.6 °C) (25 124)  Pulse: 88 (25 1244)  Resp: 16 (25 124)  BP: (!) 142/74 (25 124)  SpO2: 98 % (25 124)    Vital Signs Range (Last 24H):  Temp:  [97.9 °F (36.6 °C)]   Pulse:  [88-91]   Resp:  [16]   BP: (118-142)/(74-76)   SpO2:  [98 %]      Physical Exam  Cardiovascular:      Rate and Rhythm: Normal rate.   Pulmonary:      Effort: Pulmonary effort is normal.   Neurological:      Mental Status: She is alert and oriented to person, place, and time.         Assessment:    Direct access screening colonoscopy from patient's PCP average risk for CRC by personal and family history today.      Plan:    Direct access screening colonoscopy from patient's PCP average risk for CRC by personal and family history today.

## 2025-02-06 NOTE — PROVATION PATIENT INSTRUCTIONS
Discharge Summary/Instructions after an Endoscopic Procedure  Patient Name: Josselyn Tarango  Patient MRN: [id number]  Patient YOB: 1965 Thursday, February 6, 2025  Pino Cuellar MD  Dear patient,  As a result of recent federal legislation (The Federal Cures Act), you may   receive lab or pathology results from your procedure in your [facility]   account before your physician is able to contact you. Your physician or   their representative will relay the results to you with their   recommendations at their soonest availability.  Thank you,  RESTRICTIONS:  During your procedure today, you received medications for sedation.  These   medications may affect your judgment, balance and coordination.  Therefore,   for 24 hours, you have the following restrictions:   - DO NOT drive a car, operate machinery, make legal/financial decisions,   sign important papers or drink alcohol.    ACTIVITY:  Today: no heavy lifting, straining or running due to procedural   sedation/anesthesia.  The following day: return to full activity including work.  DIET:  Eat and drink normally unless instructed otherwise.     TREATMENT FOR COMMON SIDE EFFECTS:  - Mild abdominal pain, nausea, belching, bloating or excessive gas:  rest,   eat lightly and use a heating pad.  - Sore Throat: treat with throat lozenges and/or gargle with warm salt   water.  - Because air was used during the procedure, expelling large amounts of air   from your rectum or belching is normal.  - If a bowel prep was taken, you may not have a bowel movement for 1-3 days.    This is normal.  SYMPTOMS TO WATCH FOR AND REPORT TO YOUR PHYSICIAN:  1. Abdominal pain or bloating, other than gas cramps.  2. Chest pain.  3. Back pain.  4. Signs of infection such as: chills or fever occurring within 24 hours   after the procedure.  5. Rectal bleeding, which would show as bright red, maroon, or black stools.   (A tablespoon of blood from the rectum is not serious,  especially if   hemorrhoids are present.)  6. Vomiting.  7. Weakness or dizziness.  GO DIRECTLY TO THE NEAREST EMERGENCY ROOM IF YOU HAVE ANY OF THE FOLLOWING:      Difficulty breathing              Chills and/or fever over 101 F   Persistent vomiting and/or vomiting blood   Severe abdominal pain   Severe chest pain   Black, tarry stools   Bleeding- more than one tablespoon   Any other symptom or condition that you feel may need urgent attention  Your doctor recommends these additional instructions:  If any biopsies were taken, your doctors clinic will contact you in 1 to 2   weeks with any results.  - Discharge patient to home.   - Await pathology results.   - Telephone endoscopist for pathology results in 3 weeks.   - Repeat colonoscopy in 3 years for surveillance of multiple polyps.   - Telephone referring physician for study results.   - Return to referring physician.   - The findings and recommendations were discussed with the patient.  For questions, problems or results please call your physician - Pino Cuellar MD at Work:  [phone].  [facility], EMERGENCY ROOM PHONE NUMBER: [phone]  IF A COMPLICATION OR EMERGENCY SITUATION ARISES AND YOU ARE UNABLE TO REACH   YOUR PHYSICIAN - GO DIRECTLY TO THE EMERGENCY ROOM.  Pino Cuellar MD  2/6/2025 2:11:47 PM  This report has been verified and signed electronically.  Dear patient,  As a result of recent federal legislation (The Federal Cures Act), you may   receive lab or pathology results from your procedure in your [facility]   account before your physician is able to contact you. Your physician or   their representative will relay the results to you with their   recommendations at their soonest availability.  Thank you,  PROVATION

## 2025-02-06 NOTE — PROGRESS NOTES
Patient was seen today for colorectal screening. Tolerated procedure well. Polyps x3 found. Repeat colonoscopy in 3 years for surveillance of multiple polyps.  Pending pathology results. Was contacted by specialist office GI doctor (Dr. Silva) who had concerns regarding some findings while reviewing patients chart.     With history of Heavy ETOH drinker for some years. And imagining findings from 10/15/24: Liver: Normal size and attenuation.  Indeterminate 16 mm hypodensity within the right hepatic dome similar to prior exam. Hepatic mass protocol MRI or CT would be recommended.     This was never followed up. Recent labs indicate history of mild anemia and thrombocytopenia.     I contacted Josselyn Tarango. Confirmed .    I introduced myself. I explained in detail the previous CT findings and labs. Recommendations were provided. She is agreeable on being referred to hepatologist and obtaining additional imaging of liver.     1. Hypodense mass of liver (Primary)  2. Intra-abdominal and pelvic swelling, mass and lump, unspecified site  - CT Abdomen With IV Contrast Routine Oral Contrast; Future  - Ambulatory referral/consult to Hepatology; Future    Please do not hesitate to reach out with any questions or concerns.  [facility] ([phone])    Ryder Nichols MD  [facility], PGY-1  2025

## 2025-02-06 NOTE — ANESTHESIA POSTPROCEDURE EVALUATION
Anesthesia Post Evaluation    Patient: Josselyn Tarango    Procedure(s) Performed: Procedure(s) (LRB):  COLONOSCOPY, SCREENING, HIGH RISK PATIENT (N/A)    Final Anesthesia Type: general      Patient location during evaluation: PACU  Patient participation: Yes- Able to Participate  Level of consciousness: awake and alert  Post-procedure vital signs: reviewed and stable  Pain management: adequate  Airway patency: patent    PONV status at discharge: No PONV  Anesthetic complications: no      Cardiovascular status: blood pressure returned to baseline and hemodynamically stable  Respiratory status: unassisted and spontaneous ventilation  Hydration status: euvolemic  Follow-up not needed.              Vitals Value Taken Time   BP 97/52 02/06/25 1411   Temp 36.6 °C (97.9 °F) 02/06/25 1411   Pulse 74 02/06/25 1411   Resp 18 02/06/25 1411   SpO2 94 % 02/06/25 1411         No case tracking events are documented in the log.      Pain/Barbra Score: Barbra Score: 10 (2/6/2025  2:13 PM)

## 2025-02-06 NOTE — TRANSFER OF CARE
"Anesthesia Transfer of Care Note    Patient: Josselyn Tarango    Procedure(s) Performed: Procedure(s) (LRB):  COLONOSCOPY, SCREENING, HIGH RISK PATIENT (N/A)    Patient location: [facility]    Anesthesia Type: general    Transport from OR: Transported from OR on room air with adequate spontaneous ventilation    Post pain: adequate analgesia    Post assessment: no apparent anesthetic complications and tolerated procedure well    Post vital signs: stable    Level of consciousness: responds to stimulation    Nausea/Vomiting: no nausea/vomiting    Complications: none    Transfer of care protocol was followed      Last vitals: Visit Vitals  BP (!) 142/74   Pulse 88   Temp 36.6 °C (97.9 °F)   Resp 16   Ht 5' 8" (1.727 m)   Wt 87.5 kg (193 lb)   SpO2 98%   BMI 29.35 kg/m²     "

## 2025-02-07 ENCOUNTER — TELEPHONE (OUTPATIENT)
Dept: FAMILY MEDICINE | Facility: HOSPITAL | Age: 60
End: 2025-02-07
Payer: COMMERCIAL

## 2025-02-07 NOTE — TELEPHONE ENCOUNTER
Returned patients phone call clearance sheet was faxed again.    ----- Message from Nancy sent at 2/7/2025 10:34 AM CST -----  Type:  clearance forms    Who Called:pt   Does the patient know what this is regarding?:clearance for procedure   Caller would like it refaxed   Would the patient rather a call back or a response via [facility]? Call   Best Call Back Number: [phone]  Additional Information: Please fax to [phone] ([facility])

## 2025-02-08 LAB — BACTERIA UR CULT: ABNORMAL

## 2025-02-12 ENCOUNTER — PATIENT MESSAGE (OUTPATIENT)
Dept: GASTROENTEROLOGY | Facility: CLINIC | Age: 60
End: 2025-02-12
Payer: MEDICAID

## 2025-02-12 LAB
FINAL PATHOLOGIC DIAGNOSIS: NORMAL
GROSS: NORMAL
Lab: NORMAL

## 2025-02-12 NOTE — PROGRESS NOTES
Important:    Josselyn your colonoscopy pathology was benign but your colon polyps were adenomas.  Adenomas are benign but the precancerous type of colon polyps so by remove it I am we decrease your risk of colon cancer    When your colon polyps was 12 mm we call that an advanced colon adenomas polyp.    As a result we recommend that your next surveillance colonoscopy in 3 years.    It is recommended because your advanced colon adenomas polyp that all your first-degree relative (your children, your siblings, and your parents) get the 1st screening colonoscopy at age 40 and every 5 years even if normal.    1. Colon, cecal polyp (polypectomy):  Tubular adenoma    2. Colon, transverse 1 mm polyp (polypectomy):  Tubular adenoma    3. Colon, sigmoid 12 mm tubular adenoma polyp (polypectomy):  Tubular adenoma.  No high-grade dysplasia.   Comment: Interp By Mikaela Leslie M.D., Signed on 02/12/2025

## 2025-02-13 ENCOUNTER — HOSPITAL ENCOUNTER (OUTPATIENT)
Dept: RADIOLOGY | Facility: HOSPITAL | Age: 60
Discharge: HOME | End: 2025-02-13
Payer: MEDICAID

## 2025-02-13 DIAGNOSIS — R19.00 INTRA-ABDOMINAL AND PELVIC SWELLING, MASS AND LUMP, UNSPECIFIED SITE: ICD-10-CM

## 2025-02-13 DIAGNOSIS — R16.0 HYPODENSE MASS OF LIVER: ICD-10-CM

## 2025-02-13 PROCEDURE — 74160 CT ABDOMEN W/CONTRAST: CPT | Mod: 26,,, | Performed by: RADIOLOGY

## 2025-02-13 PROCEDURE — 25500020 PHARM REV CODE 255

## 2025-02-13 PROCEDURE — 74160 CT ABDOMEN W/CONTRAST: CPT | Mod: TC

## 2025-02-13 RX ADMIN — IOHEXOL 100 ML: 350 INJECTION, SOLUTION INTRAVENOUS at 08:02

## 2025-02-13 RX ADMIN — IOHEXOL 15 ML: 350 INJECTION, SOLUTION INTRAVENOUS at 08:02

## 2025-02-20 ENCOUNTER — TELEPHONE (OUTPATIENT)
Dept: FAMILY MEDICINE | Facility: HOSPITAL | Age: 60
End: 2025-02-20
Payer: MEDICAID

## 2025-02-20 NOTE — TELEPHONE ENCOUNTER
Returned patients call no answer left voicemail.         ----- Message from Nancy sent at 2/19/2025  4:42 PM CST -----  Type:  Needs Medical AdviceWho Called: Pt Symptoms (please be specific): Symptoms: Earache, Abdominal Pain - Female - Not PregnantOutcome: Schedule an urgent appointment (within 4 hours) or talk to a nurse or provider within 30 minutes.Reason: Caller denied all higher acuity questions How long has patient had these symptoms:  Pharmacy name and phone #:  Would the patient rather a call back or a response via [facility]? Call Best Call Back Number: [phone] Additional Information: Caller stated she rides with transportation and they'll need a 24 hr notice that's why appt was cxled on 02/20

## 2025-03-06 ENCOUNTER — OFFICE VISIT (OUTPATIENT)
Dept: FAMILY MEDICINE | Facility: HOSPITAL | Age: 60
End: 2025-03-06
Payer: COMMERCIAL

## 2025-03-06 DIAGNOSIS — R30.0 DYSURIA: ICD-10-CM

## 2025-03-06 DIAGNOSIS — R10.9 ABDOMINAL PAIN, UNSPECIFIED ABDOMINAL LOCATION: ICD-10-CM

## 2025-03-06 DIAGNOSIS — R16.0 LIVER MASS: ICD-10-CM

## 2025-03-06 DIAGNOSIS — H92.09 OTALGIA, UNSPECIFIED LATERALITY: ICD-10-CM

## 2025-03-06 DIAGNOSIS — R73.03 PREDIABETES: Primary | ICD-10-CM

## 2025-03-06 PROCEDURE — 90677 PCV20 VACCINE IM: CPT

## 2025-03-06 PROCEDURE — 99214 OFFICE O/P EST MOD 30 MIN: CPT

## 2025-03-06 RX ADMIN — PNEUMOCOCCAL 20-VALENT CONJUGATE VACCINE 0.5 ML
2.2; 2.2; 2.2; 2.2; 2.2; 2.2; 2.2; 2.2; 2.2; 2.2; 2.2; 2.2; 2.2; 2.2; 2.2; 2.2; 4.4; 2.2; 2.2; 2.2 INJECTION, SUSPENSION INTRAMUSCULAR at 01:03

## 2025-03-07 ENCOUNTER — LAB VISIT (OUTPATIENT)
Dept: LAB | Facility: HOSPITAL | Age: 60
End: 2025-03-07
Payer: MEDICAID

## 2025-03-07 VITALS
BODY MASS INDEX: 29.7 KG/M2 | HEART RATE: 91 BPM | SYSTOLIC BLOOD PRESSURE: 132 MMHG | WEIGHT: 196 LBS | OXYGEN SATURATION: 97 % | HEIGHT: 68 IN | DIASTOLIC BLOOD PRESSURE: 85 MMHG

## 2025-03-07 DIAGNOSIS — R30.0 DYSURIA: ICD-10-CM

## 2025-03-07 LAB
BACTERIA #/AREA URNS HPF: ABNORMAL /HPF
BILIRUB UR QL STRIP: NEGATIVE
CLARITY UR: ABNORMAL
COLOR UR: YELLOW
GLUCOSE UR QL STRIP: NEGATIVE
HGB UR QL STRIP: ABNORMAL
KETONES UR QL STRIP: NEGATIVE
LEUKOCYTE ESTERASE UR QL STRIP: ABNORMAL
MICROSCOPIC COMMENT: ABNORMAL
NITRITE UR QL STRIP: NEGATIVE
PH UR STRIP: 6 [PH] (ref 5–8)
PROT UR QL STRIP: NEGATIVE
RBC #/AREA URNS HPF: 2 /HPF (ref 0–4)
SP GR UR STRIP: 1.02 (ref 1–1.03)
SQUAMOUS #/AREA URNS HPF: 1 /HPF
URN SPEC COLLECT METH UR: ABNORMAL
UROBILINOGEN UR STRIP-ACNC: NEGATIVE EU/DL
WBC #/AREA URNS HPF: >100 /HPF (ref 0–5)

## 2025-03-07 PROCEDURE — 81000 URINALYSIS NONAUTO W/SCOPE: CPT

## 2025-03-07 PROCEDURE — 87088 URINE BACTERIA CULTURE: CPT

## 2025-03-07 PROCEDURE — 87086 URINE CULTURE/COLONY COUNT: CPT

## 2025-03-07 PROCEDURE — 87186 SC STD MICRODIL/AGAR DIL: CPT

## 2025-03-07 RX ORDER — FLUTICASONE PROPIONATE 50 MCG
1 SPRAY, SUSPENSION (ML) NASAL DAILY
Qty: 15.8 ML | Refills: 1 | Status: SHIPPED | OUTPATIENT
Start: 2025-03-07

## 2025-03-07 NOTE — PROGRESS NOTES
I assume primary medical responsibility for this patient. I have reviewed the history, physical, and assessement & treatment plan with the resident and agree that the care is reasonable and necessary. This service has been performed by a resident without the presence of a teaching physician under the primary care exception. If necessary, an addendum of additional findings or evaluation beyond the resident documentation will be noted below.     Ledy Rm MD

## 2025-03-07 NOTE — PROGRESS NOTES
"[facility]  History & Physical    SUBJECTIVE:     Chief Complaint:   Chief Complaint   Patient presents with    Otalgia       History of Present Illness:  59 y.o. female who  has a past medical history of Allergy, Bilateral flank pain (2025), Diabetes mellitus, Digestive disorder, Kidney stone, and Urinary tract infection, past EtOH use. presents to clinic today for bilat ear pain, abd pain.    She notes several weeks bilat inner ear pain L > R w/o fever, drainage, hearing loss, increased mucus production or URI symptoms. Interested in ENT.    Initial BP was 166/88. Patient asymptomatic, states she gets "white coat" HTN at doctors' visits. Repeat 132/85.     She also complains of several weeks of RUQ pain, not affected by diet. She is concerned about mass found Right hepatic lobe 1.8 cm enhancing lesion. CT performed 1mo ago found lesion, referred to Hepatology. Radiology recommends further imaging. Discussed and ordered.   She also adds to abd pain, one week of polyuria and dysuria w/o fever or change in color.     Mammo up to date, s/p hysterectomy, colonoscopy due in 3 yrs per colonoscopy note.    Patient also plans for L cataract procedure w/Dr. Campoverde, requests paperwork signed. She will bring paperwork.    Allergies:  Review of patient's allergies indicates:   Allergen Reactions    Sulfa (sulfonamide antibiotics)        Home Medications:  Current Outpatient Medications on File Prior to Visit   Medication Sig    acetaminophen 325 mg Cap Take 650 mg by mouth.    aspirin 325 MG tablet Take 650 mg by mouth.    atorvastatin (LIPITOR) 40 MG tablet Take 0.5 tablets (20 mg total) by mouth once daily.    incontinence pad, liner, disp Pads 1 Bag by Misc.(Non-Drug; Combo Route) route every 14 (fourteen) days.    nystatin (MYCOSTATIN) powder Apply topically 2 (two) times daily.    prednisoLONE acetate (PRED FORTE) 1 % DrpS SMARTSI Drop(s) In Eye(s) 6 Times Daily     No current facility-administered " medications on file prior to visit.       Past Medical History:   Diagnosis Date    Allergy     Bilateral flank pain 01/24/2025    Diabetes mellitus     diet control    Digestive disorder     Hx: gastric ulcer    Kidney stone     Urinary tract infection      Past Surgical History:   Procedure Laterality Date    COLONOSCOPY, SCREENING, HIGH RISK PATIENT N/A 2/6/2025    Procedure: COLONOSCOPY, SCREENING, HIGH RISK PATIENT;  Surgeon: Pino Cuellar MD;  Location: [address]);  Service: Endoscopy;  Laterality: N/A;  ref by Ryder Nichols MD, peg, portal-ae  Jm/change MD from Rodrigo to /adjust to fellow rm case lengths  1/30 pre call complete/mleone    CYSTOSCOPY W/ URETERAL STENT PLACEMENT Left 6/7/2024    Procedure: CYSTOSCOPY, left retrograde pyelogram, left JJ stent;  Surgeon: Humza Saenz MD;  Location: [facility];  Service: Urology;  Laterality: Left;  MAC vs choice    CYSTOURETEROSCOPY, WITH HOLMIUM LASER LITHOTRIPSY OF URETERAL CALCULUS AND STENT INSERTION Left 6/17/2024    Procedure: Cystoscopy, left retrograde pyelogram, left ureteroscopy with holmium laser lithotripsy, stone basket extraction, left JJ stent;  Surgeon: Humza Saenz MD;  Location: [facility];  Service: Urology;  Laterality: Left;  Element    CYSTOURETEROSCOPY, WITH HOLMIUM LASER LITHOTRIPSY OF URETERAL CALCULUS AND STENT INSERTION Right 10/14/2024    Procedure: CYSTOURETEROSCOPY, WITH HOLMIUM LASER LITHOTRIPSY OF URETERAL CALCULUS AND STENT INSERTION;  Surgeon: Humza Saenz MD;  Location: [facility] OR;  Service: Urology;  Laterality: Right;  LMA    EXTRACTION - STONE Left 6/17/2024    Procedure: EXTRACTION - STONE;  Surgeon: Humza Saenz MD;  Location: [facility] OR;  Service: Urology;  Laterality: Left;    RETROGRADE PYELOGRAPHY N/A 6/7/2024    Procedure: PYELOGRAM, RETROGRADE;  Surgeon: Humza Saenz MD;  Location: [facility];  Service: Urology;  Laterality: N/A;    RETROGRADE PYELOGRAPHY N/A  6/17/2024    Procedure: PYELOGRAM, RETROGRADE;  Surgeon: Humza Saenz MD;  Location: [facility] OR;  Service: Urology;  Laterality: N/A;    RETROGRADE PYELOGRAPHY  10/14/2024    Procedure: PYELOGRAM, RETROGRADE;  Surgeon: Humza Saenz MD;  Location: [facility] OR;  Service: Urology;;     No family history on file.  Social History[1]     Review of Systems   Constitutional:  Negative for fever.   Respiratory:  Negative for cough and shortness of breath.    Cardiovascular:  Negative for chest pain and palpitations.   Gastrointestinal:  Positive for abdominal pain. Negative for constipation, diarrhea, heartburn, nausea and vomiting.   Genitourinary:  Positive for dysuria and frequency. Negative for flank pain and hematuria.   Neurological:  Negative for dizziness and headaches.        OBJECTIVE:     Vital Signs:  Pulse: 91 (03/06/25 1115)  BP: (!) 166/88 (03/06/25 1115)  SpO2: 97 % (03/06/25 1115)    Physical Exam  Constitutional:       Appearance: Normal appearance.   HENT:      Head: Normocephalic and atraumatic.      Right Ear: Tympanic membrane, ear canal and external ear normal.      Left Ear: Tympanic membrane, ear canal and external ear normal.   Eyes:      Extraocular Movements: Extraocular movements intact.   Cardiovascular:      Rate and Rhythm: Normal rate and regular rhythm.      Pulses: Normal pulses.      Heart sounds: Normal heart sounds. No murmur heard.  Pulmonary:      Effort: Pulmonary effort is normal. No respiratory distress.      Breath sounds: Normal breath sounds. No stridor. No wheezing, rhonchi or rales.   Abdominal:      General: Abdomen is flat. There is no distension.      Palpations: Abdomen is soft. There is no mass.      Tenderness: There is no abdominal tenderness. There is no right CVA tenderness, left CVA tenderness, guarding or rebound.   Musculoskeletal:      Right lower leg: No edema.      Left lower leg: No edema.   Neurological:      General: No focal deficit present.       "Mental Status: She is alert and oriented to person, place, and time.   Psychiatric:         Mood and Affect: Mood normal.         Behavior: Behavior normal.         Thought Content: Thought content normal.         Judgment: Judgment normal.         A/P:  Josselyn was seen today for otalgia.    Diagnoses and all orders for this visit:    Prediabetes  A1C 5.2 today  -     CBC Auto Differential; Future  -     Comprehensive Metabolic Panel; Future  -     Hemoglobin A1C; Future  -     Microalbumin/Creatinine Ratio, Urine; Future    Dysuria  -     Urinalysis, Reflex to Urine Culture Urine, Clean Catch    Abd pain  Patient complains of several weeks RUQ pain, not related to food, low suspicion for gallbladder disease  CT found 1.8cm mass to liver, recommends further imaging (ordered) and Hepatology, patient has appointment   - MRI "liver" ordered     Health Mx  Well tolerated, no bleeding  -     pneumoc 20-mathew conj-dip cr(PF) (PREVNAR-20 (PF)) injection Syrg 0.5 mL    Otalgia  Bilat L > R, w/o fever, drainage, low susp for infection  Consider eustachian tube dysfunction  - Flonase ordered      Cataracts  Patient s/p R cataract procedure, anticipating L w/Dr. Campoverde... will bring paperwork     DUE AT NEXT/FUTURE VISIT:  Return after Hepatology     Gurdeep Lal  [facility], PGY-3  03/07/2025    This note was partially created using [employer] Voice Recognition software. Typographical and content errors may occur with this process. While efforts are made to detect and correct such errors, in some cases errors will persist. For this reason, wording in this document should be considered in the proper context and not strictly verbatim     The following information is provided to all patients.  This information is to help you find resources for any of the problems found today that may be affecting your health:                Living healthy guide: www.[facility].[place].[facility]       Understanding Diabetes: " www.diabetes.org       Eating healthy: www.cdc.gov/healthyweight      CDC home safety checklist: www.cdc.gov/steadi/patient.html      Agency on Aging: www.goea.[place].gov       Alcoholics anonymous (AA): www.aa.org      Physical Activity: www.antonia.nih.gov/rf6pzau       Tobacco use: www.quitwithusla.org              [1]   Social History  Tobacco Use    Smoking status: Every Day     Current packs/day: 1.00     Average packs/day: 1 pack/day for 45.2 years (45.2 ttl pk-yrs)     Types: Cigarettes     Start date: 1980     Passive exposure: Current    Smokeless tobacco: Never    Tobacco comments:     Smoking cessation education note: Pt is a 1 pk/day cigarette smoker x 45 yrs. 21 mg nicotine patch ordered Q day. Pt states not ready to quit. Handout provided.    Substance Use Topics    Alcohol use: Yes    Drug use: Never

## 2025-03-09 LAB — BACTERIA UR CULT: ABNORMAL

## 2025-03-11 RX ORDER — PHENAZOPYRIDINE HYDROCHLORIDE 100 MG/1
100 TABLET, FILM COATED ORAL
Qty: 9 TABLET | Refills: 0 | Status: SHIPPED | OUTPATIENT
Start: 2025-03-11 | End: 2025-03-14

## 2025-03-11 RX ORDER — CEFUROXIME AXETIL 250 MG/1
250 TABLET ORAL EVERY 12 HOURS
Qty: 14 TABLET | Refills: 0 | Status: SHIPPED | OUTPATIENT
Start: 2025-03-11

## 2025-03-20 ENCOUNTER — OFFICE VISIT (OUTPATIENT)
Dept: OBSTETRICS AND GYNECOLOGY | Facility: CLINIC | Age: 60
End: 2025-03-20
Payer: MEDICAID

## 2025-03-20 VITALS
BODY MASS INDEX: 30.07 KG/M2 | WEIGHT: 198.44 LBS | SYSTOLIC BLOOD PRESSURE: 122 MMHG | DIASTOLIC BLOOD PRESSURE: 79 MMHG | HEIGHT: 68 IN

## 2025-03-20 DIAGNOSIS — N89.8 VAGINAL CYST: ICD-10-CM

## 2025-03-20 PROCEDURE — 99999 PR PBB SHADOW E&M-EST. PATIENT-LVL IV: CPT | Mod: PBBFAC,,, | Performed by: STUDENT IN AN ORGANIZED HEALTH CARE EDUCATION/TRAINING PROGRAM

## 2025-03-20 PROCEDURE — 99214 OFFICE O/P EST MOD 30 MIN: CPT | Mod: PBBFAC,PO | Performed by: STUDENT IN AN ORGANIZED HEALTH CARE EDUCATION/TRAINING PROGRAM

## 2025-03-20 NOTE — PROGRESS NOTES
"CC: Cyst    HPI:  Josselyn Tarango is a 59 y.o. female  presents with complaint of vaginal cyst. Pt complains of a recurrent vaginal cyst of the left labia majora that occasionally enlarges and drains. Pt states the lesion occasionally increases to the size of a "golf ball" and then will drain. She often manually drains it on her own. She has not tried any medical therapy on the area so far. She has a history of pilonidal cysts and is seeing new MD for this issue. Pt had a h/o hysterectomy and salpingectomy for uterine prolapse. She now reports urinary incontinence followed by urology and bladder prolapse has an appointment with Urogynecology for this issue.         ROS:  GENERAL: No fever, chills, fatigability or weight loss.  VULVAR: No pain, no itching. Right labial cyst   VAGINAL: No relaxation, no itching, no discharge, no abnormal bleeding and no lesions.  ABDOMEN: No abdominal pain. Denies nausea. Denies vomiting. No diarrhea. No constipation  BREAST: Denies pain. No lumps. No discharge.  URINARY: + incontinence, no nocturia, no frequency and no dysuria.  CARDIOVASCULAR: No chest pain. No shortness of breath. No leg cramps.  NEUROLOGICAL: No headaches. No vision changes.      Patient History:  Past Medical History:   Diagnosis Date    Allergy     Bilateral flank pain 2025    Diabetes mellitus     diet control    Digestive disorder     Hx: gastric ulcer    Kidney stone     Urinary tract infection      Past Surgical History:   Procedure Laterality Date    COLONOSCOPY, SCREENING, HIGH RISK PATIENT N/A 2025    Procedure: COLONOSCOPY, SCREENING, HIGH RISK PATIENT;  Surgeon: Pino Cuellar MD;  Location: [address];  Service: Endoscopy;  Laterality: N/A;  ref by Ryder Nichols MD, peg, portal-ae  Jm/change MD from Rodrigo to /adjust to fellow  case lengths   pre call complete/mleone    CYSTOSCOPY W/ URETERAL STENT PLACEMENT Left 2024    Procedure: CYSTOSCOPY, " "left retrograde pyelogram, left JJ stent;  Surgeon: Humza Saenz MD;  Location: [facility] OR;  Service: Urology;  Laterality: Left;  MAC vs choice    CYSTOURETEROSCOPY, WITH HOLMIUM LASER LITHOTRIPSY OF URETERAL CALCULUS AND STENT INSERTION Left 2024    Procedure: Cystoscopy, left retrograde pyelogram, left ureteroscopy with holmium laser lithotripsy, stone basket extraction, left JJ stent;  Surgeon: Humza Saenz MD;  Location: [facility] OR;  Service: Urology;  Laterality: Left;  Element    CYSTOURETEROSCOPY, WITH HOLMIUM LASER LITHOTRIPSY OF URETERAL CALCULUS AND STENT INSERTION Right 10/14/2024    Procedure: CYSTOURETEROSCOPY, WITH HOLMIUM LASER LITHOTRIPSY OF URETERAL CALCULUS AND STENT INSERTION;  Surgeon: Humza Saenz MD;  Location: [facility];  Service: Urology;  Laterality: Right;  LMA    EXTRACTION - STONE Left 2024    Procedure: EXTRACTION - STONE;  Surgeon: Humza Saenz MD;  Location: [facility] OR;  Service: Urology;  Laterality: Left;    RETROGRADE PYELOGRAPHY N/A 2024    Procedure: PYELOGRAM, RETROGRADE;  Surgeon: Humza Saenz MD;  Location: [facility] OR;  Service: Urology;  Laterality: N/A;    RETROGRADE PYELOGRAPHY N/A 2024    Procedure: PYELOGRAM, RETROGRADE;  Surgeon: Humza Saenz MD;  Location: [facility] OR;  Service: Urology;  Laterality: N/A;    RETROGRADE PYELOGRAPHY  10/14/2024    Procedure: PYELOGRAM, RETROGRADE;  Surgeon: Humza Saenz MD;  Location: [facility];  Service: Urology;;     Social History[1]  No family history on file.  OB History    Para Term  AB Living   3 3 3   3   SAB IAB Ectopic Multiple Live Births             # Outcome Date GA Lbr Bob/2nd Weight Sex Type Anes PTL Lv   3 Term            2 Term            1 Term                Objective:   /79   Ht 5' 8" (1.727 m)   Wt 90 kg (198 lb 6.6 oz)   BMI 30.17 kg/m²   No LMP recorded. Patient is postmenopausal.      PHYSICAL EXAM:  APPEARANCE: Well nourished, well " developed, in no acute distress.  AFFECT: WNL, alert and oriented x 3  SKIN: No acne or hirsutism  CHEST: Good respiratory effect  PELVIC: left labia majora with evidence of scar tissue at 4 o'clock, 1cm in size, scar tissue palpated possible cyst cavity (no fluid, flat/drained).  No evidence of right labia majora lesions/unremarkable.  Normal hair distribution.  Adequate perineal body, normal urethral meatus.  Vagina without lesions or discharge.  Cervix surgically absent.  EXTREMITIES: No edema.      ASSESSMENT and PLAN:    ICD-10-CM ICD-9-CM    1. Vaginal cyst  N89.8 623.8 Ambulatory referral/consult to Obstetrics / Gynecology          Vaginal Cyst  - There are no signs of active cyst formation at this time. Discussed options for drainage if cyst re-appears.   - Briefly discussed option for surgical management and removal of cyst capsule if she continues to have issues.  - Patient understanding of plan and will rtc if there are any concerns.       Follow up: as needed if symptoms     Mary FERRERA  [facility]     Carmen Bateman MD  OBGYN [facility]            [1]   Social History  Tobacco Use    Smoking status: Every Day     Current packs/day: 1.00     Average packs/day: 1 pack/day for 45.2 years (45.2 ttl pk-yrs)     Types: Cigarettes     Start date: 1980     Passive exposure: Current    Smokeless tobacco: Never    Tobacco comments:     Smoking cessation education note: Pt is a 1 pk/day cigarette smoker x 45 yrs. 21 mg nicotine patch ordered Q day. Pt states not ready to quit. Handout provided.    Substance Use Topics    Alcohol use: Not Currently    Drug use: Never

## 2025-03-27 ENCOUNTER — TELEPHONE (OUTPATIENT)
Dept: SURGERY | Facility: CLINIC | Age: 60
End: 2025-03-27
Payer: COMMERCIAL

## 2025-03-27 NOTE — TELEPHONE ENCOUNTER
Spoke with pt made a new appointment on 4/22 at the [facility] for 9am. Pt stated her transportation did not show up she was unable to come to appointment.

## 2025-03-31 NOTE — PROGRESS NOTES
[facility] - UROGYNECOLOGY  [address] [address]    Josselyn Tarango  [id number]  1965 April 1, 2025    Consulting Physician: Tri Carlton FNP     Primary M.D.: Ryder Nichols MD    Chief Complaint   Patient presents with    prolapse       HPI:     1)  UI:  (+) JORGE < (+) UUI  X 1years.  (+) pads:2-4/day, usually moderate wetness.  Daytime frequency: Q 1-2 hours during the day.  Nocturia: Yes: 4/night.   (+) dysuria with initial and during micturiation,  (--) hematuria,  (+) frequent UTIs.  (--) complete bladder emptying.     2)  POP:  Present. above introitus.  Symptoms:(--)  .  (+) vaginal bleeding. (--) vaginal discharge. (--) sexually active.   (--)  Vaginal dryness.  (--) vaginal estrogen use.     3)  BM:  (--) constipation/straining.  (--) chronic diarrhea. (--) hematochezia.  (--) fecal incontinence.  (--) fecal smearing/urgency.  (--) complete evacuation.  Feels like stool gets trapped in vagina    4)kidney stones    01/2025  Renal stone ct  No acute abnormalities.  No obstructive uropathy.   Evaluation of solid organ and vascular pathology is limited due to lack of IV contrast.   All CT scans at this facility use dose modulation, iterative reconstruction, and/or weight based dosing when appropriate to reduce radiation dose to as low as reasonable achievable.    Past Medical History  Past Medical History:   Diagnosis Date    Allergy     Bilateral flank pain 01/24/2025    Diabetes mellitus     diet control    Digestive disorder     Hx: gastric ulcer    Kidney stone     Urinary tract infection         Past Surgical History  Past Surgical History:   Procedure Laterality Date    COLONOSCOPY, SCREENING, HIGH RISK PATIENT N/A 2/6/2025    Procedure: COLONOSCOPY, SCREENING, HIGH RISK PATIENT;  Surgeon: Pino Cuellar MD;  Location: [address];  Service: Endoscopy;  Laterality: N/A;  ref by Ryder Nichols MD, peg, portal-ae  Jm/kanika KELLY from [facility] to  /adjust to fellow  case lengths   pre call complete/mleone    CYSTOSCOPY W/ URETERAL STENT PLACEMENT Left 2024    Procedure: CYSTOSCOPY, left retrograde pyelogram, left JJ stent;  Surgeon: Humza Saenz MD;  Location: [facility] OR;  Service: Urology;  Laterality: Left;  MAC vs choice    CYSTOURETEROSCOPY, WITH HOLMIUM LASER LITHOTRIPSY OF URETERAL CALCULUS AND STENT INSERTION Left 2024    Procedure: Cystoscopy, left retrograde pyelogram, left ureteroscopy with holmium laser lithotripsy, stone basket extraction, left JJ stent;  Surgeon: Humza Saenz MD;  Location: [facility] OR;  Service: Urology;  Laterality: Left;  Element    CYSTOURETEROSCOPY, WITH HOLMIUM LASER LITHOTRIPSY OF URETERAL CALCULUS AND STENT INSERTION Right 10/14/2024    Procedure: CYSTOURETEROSCOPY, WITH HOLMIUM LASER LITHOTRIPSY OF URETERAL CALCULUS AND STENT INSERTION;  Surgeon: Humza Saenz MD;  Location: [facility] OR;  Service: Urology;  Laterality: Right;  LMA    EXTRACTION - STONE Left 2024    Procedure: EXTRACTION - STONE;  Surgeon: Humza Saenz MD;  Location: [facility] OR;  Service: Urology;  Laterality: Left;    RETROGRADE PYELOGRAPHY N/A 2024    Procedure: PYELOGRAM, RETROGRADE;  Surgeon: Humza Saenz MD;  Location: [facility] OR;  Service: Urology;  Laterality: N/A;    RETROGRADE PYELOGRAPHY N/A 2024    Procedure: PYELOGRAM, RETROGRADE;  Surgeon: Humza Saenz MD;  Location: [facility] OR;  Service: Urology;  Laterality: N/A;    RETROGRADE PYELOGRAPHY  10/14/2024    Procedure: PYELOGRAM, RETROGRADE;  Surgeon: Humza Saenz MD;  Location: [facility] OR;  Service: Urology;;    Lap btl    Hysterectomy: Yes - Date: .  Indication: prolapse.    Type: vaginal  Cervix present: No  Ovaries present: Yes -   Other procedures at time of hysterectomy:  n/a    Past Ob History     x 3.  Largest infant weight: 7# 11oz  no FAVD. yes episiotomy.      Gynecologic History  LMP: No LMP  "recorded. Patient is postmenopausal.  Age of menarche: 10  Age of menopause: 31  Menstrual history: normal  Pap test: post hysterectomy.  History of abnormal paps: Yes - one-- had cryo  History of STIs:  No  Mammogram: Date of last: 12/2024.  Result: Normal  Colonoscopy: Date of last: 02/2025.  Result:  diverticulosis, polyps, internal hemorrhoids.  Repeat due:  3 years.    DEXA:  n/a    Family History  No family history on file.   Colon CA: No  Breast CA: No  GYN CA: ? Mother female cancer   CA: No    Social History  Tobacco Use History[1].  Still smoking  Social History     Substance and Sexual Activity   Alcohol Use Not Currently   .    Social History     Substance and Sexual Activity   Drug Use Never   .    Allergies  Review of patient's allergies indicates:   Allergen Reactions    Sulfa (sulfonamide antibiotics)        Medications  Medications Ordered Prior to Encounter[2]    Review of Systems A 14 point ROS was reviewed with pertinent positives as noted above in the history of present illness.      Constitutional: negative  Eyes: negative  Endocrine: negative  Gastrointestinal: negative  Cardiovascular: negative  Respiratory: negative  Allergic/Immunologic: negative  Integumentary: negative  Psychiatric: negative  Musculoskeletal: negative   Ear/Nose/Throat: negative  Neurologic: negative  Genitourinary: SEE HPI  Hematologic/Lymphatic: negative   Breast: negative    Urogynecologic Exam  BP (!) 142/88 (BP Location: Left arm, Patient Position: Sitting)   Ht 5' 8" (1.727 m)   Wt 89.2 kg (196 lb 10.4 oz)   BMI 29.90 kg/m²     GENERAL APPEARANCE:  The patient is well-developed, well-nourished.   Neck:  Supple with no thyromegaly, no carotid bruits.  Heart:  Regular rate and rhythm, no murmurs, rubs or gallops.  Lungs:  Clear.  No CVA tenderness.  Abdomen:  Soft, nontender, nondistended, no hepatosplenomegaly.      PELVIC:    External genitalia:  Normal Bartholins, Skenes and labia bilaterally.  Scar noted from " prior L labial abscess   Urethra:  No caruncle, diverticulum or masses.  (+) hypermobility.    Vagina:  Atrophy (+) , no bladder masses or tender, no discharge.    Cervix:  absent  Uterus: uterus absent  Adnexa: Not palpable.    POP-Q:  Aa -2; Ba -2; C -7; Ap -2; Bp -2; D n/a.  Genital hiatus 5, perineal body 2 total vaginal length 8.      NEUROLOGIC:  Cranial nerves 2 through 12 intact.  Strength 5/5.  DTRs 2+ lower extremities.  S2 through 4 normal.  Sacral reflexes intact.    EXT: MATIAS, 2+ pulses bilaterally, no C/C/E    COUGH STRESS TEST:  negative  KEGEL: 1 /5    RECTAL:    External:  Normal, (+) hemorrhoids, (--) dovetailing.   Internal:  deferred  Pilonidal cyst noted at sacrum--scant yellow exudate expressed    PVR: 40 mL    Impression    1. Midline cystocele    2. Frequent UTI    3. Smoker    4. Urge incontinence    5. Stress incontinence in female    6. Rectocele    7. Constipation, unspecified constipation type    8. Pilonidal cyst        Initial Plan  The patient was counseled regarding these issues. The patient was given a summary sheet containing each of these issues with possible options for evaluation and management. When appropriate, we also reviewed computer-generated diagrams specific to their diagnoses..  All questions were addressed to the patient's satisfaction.     1)  Frequent UTIs (bladder infections):  --urine C&S/bytology sent today  --If you feel like you have a UTI:     --During the work week: call PCP or go to nearest [facility]   --After hours or on weekends: go to nearest [facility]    --These facilities can check your urine for infection, send a urine culture to verify presence/absence of infection, and start treatment if needed.    --After you are evaluated, please send a message through [facility] portal or call your urogynecology provider's office to let them know so that they can follow trends.  --follow UTI prevention tips (see attached)  --work on emptying bladder  well:  --empty bladder every 2-3 hours.  Empty well: wait a minute, lean forward on toilet.    --prolapse present: stage 1 cystocele/rectocele   --PVR today: 40  --control bowel movements/fecal cross-contamination  --treat vaginal atrophy (dryness)  --empty bladder before and after intercourse  --consider taking daily combination pill: cranberry + D-mannose (5881-5102 mg) + probiotic    --look on [place] or in drug/grocery store for any brand that has these components   --examples of brands: Biophix, Now, AZO  --consider need for further evaluation ( tract imaging, cystoscopy) if issue persists  --last  tract imagin2024 retroperitoneal ultrasound Benign right renal cyst 4.8 cm.   Possible small cyst at the midpole of the left kidney containing minimal debris within versus focal dilated calyx.    --last cystoscopy: 10/2024 with stone retrieval     2)vaginal dryness  --start vaginal estrogen cream  --use dime size amount in vagina-- insert to your second knuckle and rub around just inside vaginal opening nightly x 2 weeks then twice weekly    3) stage 1 cystocele, stage 1 rectocele  --continue to monitor    4)constipation  -- Start daily fiber.  Take 1 tsp of fiber powder (psyllium or other sugar-free powder).  Mix in 8 oz of water.  Take x 3-5 days.  Then, increase fiber by 1 tsp every 3-5 days until stool is easy to pass.  Stop and continue at that dose.   Do not exceed 6 tsps/day.  May also use over the counter stool softener 1-2 x/day.  AVOID laxatives.  Or   Start 1 fiber gummyl/day x 3 days.  Then 2 gummies/day x 3 days.  Then 3 gummies/day x 3 days...increasing in this fashion to max 6 gummies a day.  STOP when you find dose that makes stool easy to pass (this may be 1 gummy a day or may be 4 gummies/day).  Continue at this dose FOREVER.  AVOID laxatives    5)Mixed urinary incontinence, urge > stress:    --Empty bladder every 3 hours.  Empty well: wait a minute, lean forward on toilet.    --Avoid  dietary irritants (see sheet).  Keep diary x 3-5 days to determine your irritants.  --KEGELS: do 10 in AM and 10 in PM, holding each x 10 seconds.  When you feel urge to go, STOP, KEGEL, and when urge has passed, then go to bathroom.  Consider PT in future.    --URGE:trial myrbetriq 50 mg daily.Takes 2-4 weeks to see if will have effect.  For dry mouth: get sour, sugar free lozenge or gum.    --STRESS:  Pessary vs. Sling.     6)pilonidal cyst  --scheduled with colon rectal    7)? Vulvar cyst  --call if L labia swells again-- need to see if it has a connection with frequent uti    8)RTC 3 months for follow up    I spent a total of 40 minutes on the day of the visit.       Thank you for requesting consultation of your patient.  I look forward to participating in their care.    Niesha Spence  Female Pelvic Medicine and Reconstructive Surgery  [facility] [place], [place]         [1]   Social History  Tobacco Use   Smoking Status Every Day    Current packs/day: 1.00    Average packs/day: 1 pack/day for 45.2 years (45.2 ttl pk-yrs)    Types: Cigarettes    Start date: 1980    Passive exposure: Current   Smokeless Tobacco Never   Tobacco Comments    Smoking cessation education note: Pt is a 1 pk/day cigarette smoker x 45 yrs. 21 mg nicotine patch ordered Q day. Pt states not ready to quit. Handout provided.    [2]   Current Outpatient Medications on File Prior to Visit   Medication Sig Dispense Refill    acetaminophen 325 mg Cap Take 650 mg by mouth.      aspirin 325 MG tablet Take 650 mg by mouth.      atorvastatin (LIPITOR) 40 MG tablet Take 0.5 tablets (20 mg total) by mouth once daily. 45 tablet 3    cefUROXime (CEFTIN) 250 MG tablet Take 1 tablet (250 mg total) by mouth every 12 (twelve) hours. 14 tablet 0    fluticasone propionate (FLONASE) 50 mcg/actuation nasal spray 1 spray (50 mcg total) by Each Nostril route once daily. 15.8 mL 1    incontinence pad, liner, disp Pads 1 Bag by Misc.(Non-Drug;  Combo Route) route every 14 (fourteen) days. 40 each 11    nystatin (MYCOSTATIN) powder Apply topically 2 (two) times daily. 15 g 0    prednisoLONE acetate (PRED FORTE) 1 % DrpS SMARTSI Drop(s) In Eye(s) 6 Times Daily       No current facility-administered medications on file prior to visit.

## 2025-04-01 ENCOUNTER — OFFICE VISIT (OUTPATIENT)
Dept: UROGYNECOLOGY | Facility: CLINIC | Age: 60
End: 2025-04-01
Payer: MEDICAID

## 2025-04-01 VITALS
SYSTOLIC BLOOD PRESSURE: 142 MMHG | DIASTOLIC BLOOD PRESSURE: 88 MMHG | HEIGHT: 68 IN | WEIGHT: 196.63 LBS | BODY MASS INDEX: 29.8 KG/M2

## 2025-04-01 DIAGNOSIS — N39.41 URGE INCONTINENCE: ICD-10-CM

## 2025-04-01 DIAGNOSIS — L05.91 PILONIDAL CYST: ICD-10-CM

## 2025-04-01 DIAGNOSIS — K59.00 CONSTIPATION, UNSPECIFIED CONSTIPATION TYPE: ICD-10-CM

## 2025-04-01 DIAGNOSIS — N39.3 STRESS INCONTINENCE IN FEMALE: ICD-10-CM

## 2025-04-01 DIAGNOSIS — N81.6 RECTOCELE: ICD-10-CM

## 2025-04-01 DIAGNOSIS — F17.200 SMOKER: ICD-10-CM

## 2025-04-01 DIAGNOSIS — N39.0 FREQUENT UTI: ICD-10-CM

## 2025-04-01 DIAGNOSIS — N81.11 MIDLINE CYSTOCELE: Primary | ICD-10-CM

## 2025-04-01 PROCEDURE — 51701 INSERT BLADDER CATHETER: CPT | Mod: PBBFAC | Performed by: NURSE PRACTITIONER

## 2025-04-01 PROCEDURE — 3044F HG A1C LEVEL LT 7.0%: CPT | Mod: CPTII,,, | Performed by: NURSE PRACTITIONER

## 2025-04-01 PROCEDURE — 1160F RVW MEDS BY RX/DR IN RCRD: CPT | Mod: CPTII,,, | Performed by: NURSE PRACTITIONER

## 2025-04-01 PROCEDURE — 99999 PR PBB SHADOW E&M-EST. PATIENT-LVL IV: CPT | Mod: PBBFAC,,, | Performed by: NURSE PRACTITIONER

## 2025-04-01 PROCEDURE — 3061F NEG MICROALBUMINURIA REV: CPT | Mod: CPTII,,, | Performed by: NURSE PRACTITIONER

## 2025-04-01 PROCEDURE — 3077F SYST BP >= 140 MM HG: CPT | Mod: CPTII,,, | Performed by: NURSE PRACTITIONER

## 2025-04-01 PROCEDURE — 99215 OFFICE O/P EST HI 40 MIN: CPT | Mod: 25,S$PBB,, | Performed by: NURSE PRACTITIONER

## 2025-04-01 PROCEDURE — 99214 OFFICE O/P EST MOD 30 MIN: CPT | Mod: PBBFAC | Performed by: NURSE PRACTITIONER

## 2025-04-01 PROCEDURE — 3066F NEPHROPATHY DOC TX: CPT | Mod: CPTII,,, | Performed by: NURSE PRACTITIONER

## 2025-04-01 PROCEDURE — 87086 URINE CULTURE/COLONY COUNT: CPT | Performed by: NURSE PRACTITIONER

## 2025-04-01 PROCEDURE — 88112 CYTOPATH CELL ENHANCE TECH: CPT | Mod: TC | Performed by: NURSE PRACTITIONER

## 2025-04-01 PROCEDURE — 1159F MED LIST DOCD IN RCRD: CPT | Mod: CPTII,,, | Performed by: NURSE PRACTITIONER

## 2025-04-01 PROCEDURE — 3008F BODY MASS INDEX DOCD: CPT | Mod: CPTII,,, | Performed by: NURSE PRACTITIONER

## 2025-04-01 PROCEDURE — 3079F DIAST BP 80-89 MM HG: CPT | Mod: CPTII,,, | Performed by: NURSE PRACTITIONER

## 2025-04-01 PROCEDURE — 51701 INSERT BLADDER CATHETER: CPT | Mod: S$PBB,,, | Performed by: NURSE PRACTITIONER

## 2025-04-01 RX ORDER — MIRABEGRON 50 MG/1
50 TABLET, FILM COATED, EXTENDED RELEASE ORAL DAILY
Qty: 30 TABLET | Refills: 11 | Status: SHIPPED | OUTPATIENT
Start: 2025-04-01 | End: 2026-04-01

## 2025-04-01 RX ORDER — ESTRADIOL 0.1 MG/G
1 CREAM VAGINAL DAILY
Qty: 42.5 G | Refills: 3 | Status: SHIPPED | OUTPATIENT
Start: 2025-04-01

## 2025-04-01 NOTE — PATIENT INSTRUCTIONS
1)  Frequent UTIs (bladder infections):  --urine C&S/bytology sent today  --If you feel like you have a UTI:     --During the work week: call PCP or go to nearest [facility]   --After hours or on weekends: go to nearest [facility]    --These facilities can check your urine for infection, send a urine culture to verify presence/absence of infection, and start treatment if needed.    --After you are evaluated, please send a message through [facility] portal or call your urogynecology provider's office to let them know so that they can follow trends.  --follow UTI prevention tips (see attached)  --work on emptying bladder well:  --empty bladder every 2-3 hours.  Empty well: wait a minute, lean forward on toilet.    --prolapse present: stage 1 cystocele/rectocele   --PVR today: 40  --control bowel movements/fecal cross-contamination  --treat vaginal atrophy (dryness)  --empty bladder before and after intercourse  --consider taking daily combination pill: cranberry + D-mannose (6220-4098 mg) + probiotic    --look on [employer] or in drug/grocery store for any brand that has these components   --examples of brands: Biophix, Now, AZO  --consider need for further evaluation ( tract imaging, cystoscopy) if issue persists  --last  tract imagin2024 retroperitoneal ultrasound Benign right renal cyst 4.8 cm.   Possible small cyst at the midpole of the left kidney containing minimal debris within versus focal dilated calyx.    --last cystoscopy: 10/2024 with stone retrieval     2)vaginal dryness  --start vaginal estrogen cream  --use dime size amount in vagina-- insert to your second knuckle and rub around just inside vaginal opening nightly x 2 weeks then twice weekly    3) stage 1 cystocele, stage 1 rectocele  --continue to monitor    4)constipation  -- Start daily fiber.  Take 1 tsp of fiber powder (psyllium or other sugar-free powder).  Mix in 8 oz of water.  Take x 3-5 days.  Then, increase fiber by 1  tsp every 3-5 days until stool is easy to pass.  Stop and continue at that dose.   Do not exceed 6 tsps/day.  May also use over the counter stool softener 1-2 x/day.  AVOID laxatives.  Or   Start 1 fiber gummyl/day x 3 days.  Then 2 gummies/day x 3 days.  Then 3 gummies/day x 3 days...increasing in this fashion to max 6 gummies a day.  STOP when you find dose that makes stool easy to pass (this may be 1 gummy a day or may be 4 gummies/day).  Continue at this dose FOREVER.  AVOID laxatives    5)Mixed urinary incontinence, urge > stress:    --Empty bladder every 3 hours.  Empty well: wait a minute, lean forward on toilet.    --Avoid dietary irritants (see sheet).  Keep diary x 3-5 days to determine your irritants.  --KEGELS: do 10 in AM and 10 in PM, holding each x 10 seconds.  When you feel urge to go, STOP, KEGEL, and when urge has passed, then go to bathroom.  Consider PT in future.    --URGE:trial myrbetriq 50 mg daily.Takes 2-4 weeks to see if will have effect.  For dry mouth: get sour, sugar free lozenge or gum.    --STRESS:  Pessary vs. Sling.     6)pilonidal cyst  --scheduled with colon rectal    7)? Vulvar cyst  --call if L labia swells again-- need to see if it has a connection with frequent uti    8)RTC 3 months for follow up      Bladder Irritants  Certain foods and drinks have been associated with worsening symptoms of urinary frequency, urgency, urge incontinence, or  bladder pain. If you suffer from any of these conditions, you may wish to try eliminating one or more of these foods from your  diet and see if your symptoms improve. If bladder symptoms are related to dietary factors, strict adherence to a diet that  eliminates the food should bring marked relief in 10 days. Once you are feeling better, you can begin to add foods back into  your diet, one at a time. If symptoms return, you will be able to identify the irritant. As you add foods back to your diet it is  very important that you drink  significant amounts of water.  Low-acid fruit substitutions include apricots, papaya, pears and watermelon. Coffee drinkers can drink Kava or other lowacid  instant drinks. Tea drinkers can substitute non-citrus herbal and sun brewed teas. Calcium carbonate co-buffered with  calcium ascorbate can be substituted for Vitamin C. Prelief is a dietary supplement that works as an acid blocker for the  bladder.  Where to get more information:   Overcoming Bladder Disorders by Deja Jonas and Cathy Khan, 1990   You Dont Have to Live with Cystitis! By Carlota Herrera, 1988  List of Common Bladder Irritants*  Alcoholic beverages  Apples and apple juice  Cantaloupe  Carbonated beverages  Chili and spicy foods  Chocolate  Citrus fruit  Coffee (including decaffeinated)  Cranberries and cranberry juice  Grapes  Guava  Milk Products: milk, cheese, cottage cheese, yogurt, ice cream  Peaches  Pineapple  Plums  Strawberries  Sugar especially artificial sweeteners, saccharin, aspartame, corn sweeteners, honey, fructose, sucrose, lactose  Tea  Tomatoes and tomato juice  Vitamin B complex  Vinegar  *Most people are not sensitive to ALL of these products; your goal is to find the foods that make YOUR  symptoms worse    UTI PREVENTION: (from National Association for Continence)  Urinary Tract Infection (UTI)  More than 4 million doctor office visits per year in the [place] are for urinary tract infections (UTI). About 12% of men and 50% of women will have a UTI during his or her lifetime. Most UTIs arise from bacteria that normally live in the colon and rectum and are present in bowel movements. These bacteria cling to the opening of the urethra, begin to multiply, & travel up to the bladder. Urine flow from the bladder usually washes bacteria out of the body. However, because women have a shorter urethra than men, bacteria can reach the bladder more easily and settle into the bladder wall.  This is why women are more likely to develop UTIs than men. This risk factor is exacerbated by the greater likelihood that women introduce bacteria from fecal matter, following a bowel movement, into the urinary tract. Less often, bacteria can spread to the kidney from the bloodstream. A recurrent UTI is classified as three or more a year.  Risk Factors for UTI  Several factors can contribute to the risk of UTI. Sexual intercourse, use of contraceptive spermicide, low levels of estrogen, catheterization, diabetes, pregnancy, and immune suppression increase susceptibility to UTI.  Naturally occurring estrogen helps prevent recurrent UTI in women. After menopause, estrogen levels drop along with the number of vaginal lactobacilli, the good bacteria which prevent growth of intestinal bacteria in the vagina. This makes postmenopausal women especially susceptible to UTI.  Catheters also present a risk of recurring UTI. Catheters are associated with colonization of bacteria and increased risks of clinical infection. Using the techniques described previously can help keep catheters clean and prevent recurrent UTI. While single-use of sterile catheters reduces the risks, it does not prevent UTI from occurring. It is therefore important to maintain proper care and use of catheters at all times while remaining alert to symptoms of UTI.  Common Symptoms of UTI   Painful urination   Frequency   Urgency   Lower abdominal or pelvic pain or pressure   Blood in the urine   Milky, cloudy, or pink/red urine   Fever   Strong smelling urine  In the elderly populations, symptoms of a urinary tract infection, can be easily overlooked, causing a delay in diagnosis. Elderly people with a UTI are more likely than younger people not to be diagnosed until the complication of sepsis occurs. The elderly often do not experience or report obvious symptoms that younger people have, such as difficult urination, or frequent urination. Instead they  may exhibit far more vague symptoms that are similiar to many disease or may be assumed to be due to the aging process. These symptoms include: confusion, feelings of general discomfort, disorientation, fatigue, weakness, behavior changes, falling, and/or a new, acute incontinence. In addition, because incontinence is common in the elderly, they may not be aware of the symptom of frequency. If you experience any of these symptoms, see your healthcare professional.  Types of UTIs   Cystitis - an inflammation of the bladder and the most common UTI. Almost always, it occurs in women. The infection typically affects only the surface of the bladder and is brief & acute.   Pyelonephritis - more commonly known as a kidney infection and usually occurs when a lower UTI spreads to the kidneys. Occasionally, bacteria will spread to the kidney from the bloodstresam. Kidney infections are more serious and less common than bladder infections. Symptoms include a fever, pain in the back or side below the ribs, nausea, or vomiting.   Urethritis - is an inflammation of the urethra. Men commonly contract urethritis through sexual intercourse with partners infected with sexually transmitted infections. Urethritis may also result from trauma to the area or from the catheterization process.  Prevention of UTI  There are several ways to prevent bladder infections.  Bathroom Behavior:Periodically emptying the bladder by trying to urinate every two to three hours.  Diet:The use of cranberry products seems to decrease the ability of bacteria to adhere to the lining of the urethra and bladder. As cranberry juice can have a high amount of sugar, cranberry extract can be taken in capsule or pill form instead. Increasing water intake by one or two glasses per day may help limit the length of time that you have symptoms and reduce the infections.  Hygiene: Proper hygiene in caring for the urethral area is another way to limit the amount or type  of bacteria that can be drawn into the urethra. This is especially true in people who have decreased sensation in the perineal region such as those with MS or who experience any amount of fecal incontinence. Using soap & water or commercially available cleansing wipes several times per day & frequent changing of incontinence pads as they become wet can minimize the amount of bacteria in the urethral area. Women should always wipe from the front of the vagina to the back of the anus after urination or a bowel movement and wear cotton underwear. It is also reported that wearing thong undergarments may increase one's risk of developing an infection.  Sexual Activity: Can be the source of UTIs in women. Insuring proper lubrication to the vagina and voiding before and after intercourse are tactics to help prevent infection. Using diaphragms and spermicidal jelly and/or foam may increase the risk of infection, so it is important to evaluate what type of birth control you use. Some physicians encourage women who have a history of recurrent infections to take an prophylactic antibiotic after intercourse, as it reduces risk of recurrent UTI by about 85%. At a minimum, restrict your number of sexual partners and urinate immediately after sexual intimacy to lower the risk of recurrent UTIs.  Vaginal Estrogen: reduces risk of recurrent UTI by repopulating the normal vaginal lactobacilli that keep bacteria from the rectum from multiplying and causing a bladder infection. Forms of vaginal estrogen are available at very low dosages that have minimal systemic absorption.  Catheter Use: proper cleansing and storage of catheters is important in one who intermittently catheterizes, as the catheter can be a vehicle to introduce infection if the technique is incorrect or if the catheters are not properly cleaned between each use. A closed system catheter provides a reliable means of sterile IC because the introducer tip is surrounded  by a urine collection bag and never exposed to bacteria typically found at the urethral opening. This greatly reduces the risk of infection.  NOTE: Vaginal estrogens and antibiotics are medications that need to be prescribed by your healthcare provider.  Treatment of UTI  Depending on the severity of infection, UTI can be treated with oral antibiotics. A three-day course of antibiotics can treat a simple UTI. However, some infections may need to be treated for several days or weeks. Length of antibiotic treatment also depends on the type of antibiotic prescribed.  Even if a few doses of medication relieve some symptoms, you should still complete the full course of medication prescribed by your doctor. Taking aspirin, Tylenol, or non-steroidal, anti-inflammatory medications may reduce symptoms during this episode, as they may be a result of increased body temperature.  For more information regarding UTIs please visit: http://www.ncbi.nlm.nih.gov/pubmedhealth/PXK7620580/

## 2025-04-03 LAB
BACTERIA UR CULT: ABNORMAL
ESTROGEN SERPL-MCNC: NORMAL PG/ML
INSULIN SERPL-ACNC: NORMAL U[IU]/ML
LAB AP CLINICAL INFORMATION: NORMAL
LAB AP GROSS DESCRIPTION: NORMAL
LAB AP PERFORMING LOCATION(S): NORMAL
LAB AP URINE CYTOLOGY INTERPRETATION SPECIMEN 1: NORMAL

## 2025-04-04 ENCOUNTER — PATIENT MESSAGE (OUTPATIENT)
Dept: UROGYNECOLOGY | Facility: CLINIC | Age: 60
End: 2025-04-04
Payer: MEDICAID

## 2025-04-04 DIAGNOSIS — N30.00 ACUTE CYSTITIS WITHOUT HEMATURIA: Primary | ICD-10-CM

## 2025-04-04 RX ORDER — CEPHALEXIN 500 MG/1
500 CAPSULE ORAL 2 TIMES DAILY
Qty: 14 CAPSULE | Refills: 0 | Status: SHIPPED | OUTPATIENT
Start: 2025-04-04 | End: 2025-04-11

## 2025-04-09 ENCOUNTER — RESULTS FOLLOW-UP (OUTPATIENT)
Dept: HEPATOLOGY | Facility: CLINIC | Age: 60
End: 2025-04-09

## 2025-04-09 ENCOUNTER — LAB VISIT (OUTPATIENT)
Dept: LAB | Facility: HOSPITAL | Age: 60
End: 2025-04-09
Attending: INTERNAL MEDICINE
Payer: MEDICAID

## 2025-04-09 ENCOUNTER — OFFICE VISIT (OUTPATIENT)
Dept: HEPATOLOGY | Facility: CLINIC | Age: 60
End: 2025-04-09
Payer: MEDICAID

## 2025-04-09 ENCOUNTER — PROCEDURE VISIT (OUTPATIENT)
Dept: HEPATOLOGY | Facility: CLINIC | Age: 60
End: 2025-04-09
Payer: MEDICAID

## 2025-04-09 VITALS
SYSTOLIC BLOOD PRESSURE: 157 MMHG | BODY MASS INDEX: 30.14 KG/M2 | RESPIRATION RATE: 18 BRPM | TEMPERATURE: 98 F | DIASTOLIC BLOOD PRESSURE: 81 MMHG | HEART RATE: 76 BPM | OXYGEN SATURATION: 98 % | HEIGHT: 68 IN | WEIGHT: 198.88 LBS

## 2025-04-09 DIAGNOSIS — R16.0 HYPODENSE MASS OF LIVER: ICD-10-CM

## 2025-04-09 DIAGNOSIS — R93.2 ABNORMAL FINDING ON IMAGING OF LIVER: ICD-10-CM

## 2025-04-09 DIAGNOSIS — Z87.898 HISTORY OF ALCOHOL USE: ICD-10-CM

## 2025-04-09 DIAGNOSIS — K76.9 LIVER DISEASE, UNSPECIFIED: ICD-10-CM

## 2025-04-09 LAB
AFP SERPL-MCNC: 3.5 NG/ML
ALBUMIN SERPL BCP-MCNC: 3.6 G/DL (ref 3.5–5.2)
ALP SERPL-CCNC: 132 UNIT/L (ref 40–150)
ALT SERPL W/O P-5'-P-CCNC: 11 UNIT/L (ref 10–44)
AST SERPL-CCNC: 12 UNIT/L (ref 11–45)
BILIRUB DIRECT SERPL-MCNC: 0.1 MG/DL (ref 0.1–0.3)
BILIRUB SERPL-MCNC: 0.3 MG/DL (ref 0.1–1)
CANCER AG19-9 SERPL-ACNC: 13.8 U/ML
HAV AB SER QL IA: NORMAL
HBV CORE AB SERPL QL IA: NORMAL
HBV SURFACE AB SER-ACNC: <3 MIU/ML
HBV SURFACE AB SERPL IA-ACNC: NORMAL M[IU]/ML
INR PPP: 1 (ref 0.8–1.2)
PROT SERPL-MCNC: 7.4 GM/DL (ref 6–8.4)
PROTHROMBIN TIME: 10.6 SECONDS (ref 9–12.5)

## 2025-04-09 PROCEDURE — 3066F NEPHROPATHY DOC TX: CPT | Mod: CPTII,,, | Performed by: INTERNAL MEDICINE

## 2025-04-09 PROCEDURE — 3077F SYST BP >= 140 MM HG: CPT | Mod: CPTII,,, | Performed by: INTERNAL MEDICINE

## 2025-04-09 PROCEDURE — 36415 COLL VENOUS BLD VENIPUNCTURE: CPT

## 2025-04-09 PROCEDURE — 86301 IMMUNOASSAY TUMOR CA 19-9: CPT

## 2025-04-09 PROCEDURE — 3079F DIAST BP 80-89 MM HG: CPT | Mod: CPTII,,, | Performed by: INTERNAL MEDICINE

## 2025-04-09 PROCEDURE — 80076 HEPATIC FUNCTION PANEL: CPT

## 2025-04-09 PROCEDURE — 99999 PR PBB SHADOW E&M-EST. PATIENT-LVL IV: CPT | Mod: PBBFAC,,, | Performed by: INTERNAL MEDICINE

## 2025-04-09 PROCEDURE — 82105 ALPHA-FETOPROTEIN SERUM: CPT

## 2025-04-09 PROCEDURE — 99203 OFFICE O/P NEW LOW 30 MIN: CPT | Mod: S$PBB,,, | Performed by: INTERNAL MEDICINE

## 2025-04-09 PROCEDURE — 3061F NEG MICROALBUMINURIA REV: CPT | Mod: CPTII,,, | Performed by: INTERNAL MEDICINE

## 2025-04-09 PROCEDURE — 86704 HEP B CORE ANTIBODY TOTAL: CPT

## 2025-04-09 PROCEDURE — 1159F MED LIST DOCD IN RCRD: CPT | Mod: CPTII,,, | Performed by: INTERNAL MEDICINE

## 2025-04-09 PROCEDURE — 3008F BODY MASS INDEX DOCD: CPT | Mod: CPTII,,, | Performed by: INTERNAL MEDICINE

## 2025-04-09 PROCEDURE — 85610 PROTHROMBIN TIME: CPT

## 2025-04-09 PROCEDURE — 91200 LIVER ELASTOGRAPHY: CPT | Mod: PBBFAC

## 2025-04-09 PROCEDURE — 1160F RVW MEDS BY RX/DR IN RCRD: CPT | Mod: CPTII,,, | Performed by: INTERNAL MEDICINE

## 2025-04-09 PROCEDURE — 3044F HG A1C LEVEL LT 7.0%: CPT | Mod: CPTII,,, | Performed by: INTERNAL MEDICINE

## 2025-04-09 PROCEDURE — 86790 VIRUS ANTIBODY NOS: CPT

## 2025-04-09 PROCEDURE — 86706 HEP B SURFACE ANTIBODY: CPT | Mod: 59

## 2025-04-09 PROCEDURE — 91200 LIVER ELASTOGRAPHY: CPT | Mod: 26,S$PBB,, | Performed by: INTERNAL MEDICINE

## 2025-04-09 PROCEDURE — 99214 OFFICE O/P EST MOD 30 MIN: CPT | Mod: PBBFAC | Performed by: INTERNAL MEDICINE

## 2025-04-09 RX ORDER — NITROFURANTOIN 25; 75 MG/1; MG/1
1 CAPSULE ORAL 2 TIMES DAILY
COMMUNITY

## 2025-04-09 NOTE — PROGRESS NOTES
Hepatology Consult Note    Referring provider: Dr. Ryder Nichols  PCP: Ryder Nichols MD    Chief complaint: liver lesion    HPI:  Josselyn Tarango is a 59 y.o. female with fatty liver, prior heavy alcohol use, obesity, prediabetes, HLD, who was referred to Hepatology Clinic for liver lesion.     Regarding risk factors for liver disease, the patient reports history heavy EtOH for at least 10 years . She is unable to quantify exactly how much alcohol she was drinking, but mentions drinking many shots a day at its heaviest. Quit drinking alcohol on Dec 31st, 2024. Reports distant use of intranasal cocaine. Denies IVDU, blood transfusions, prior tattoos. MASLD risk factors: obesity, prediabetes, HLD. Denies history of DM, HTN, ROMAINE. Reports MGM had liver cancer, but she is not aware of details.     She denies prior diagnosis of cancer.    Denies prior hepatobiliary surgeries. Denies prior weight loss surgery.     Notes extensive family history of different cancers.    The patient denies prior episodes jaundice. The patient denies prior evaluation by hepatologist, liver biopsy.    Past Medical History:   Diagnosis Date    Allergy     Bilateral flank pain 01/24/2025    Diabetes mellitus     diet control    Digestive disorder     Hx: gastric ulcer    Kidney stone     Urinary tract infection        Past Surgical History:   Procedure Laterality Date    COLONOSCOPY, SCREENING, HIGH RISK PATIENT N/A 2/6/2025    Procedure: COLONOSCOPY, SCREENING, HIGH RISK PATIENT;  Surgeon: Pino Cuellar MD;  Location: [address]);  Service: Endoscopy;  Laterality: N/A;  ref by Ryder Nichols MD, peg, portal-ae  Jm/change MD from Rodrigo to /adjust to fellow  case lengths  1/30 pre call complete/mleone    CYSTOSCOPY W/ URETERAL STENT PLACEMENT Left 6/7/2024    Procedure: CYSTOSCOPY, left retrograde pyelogram, left JJ stent;  Surgeon: Humza Saenz MD;  Location: [facility];  Service: Urology;   "Laterality: Left;  MAC vs choice    CYSTOURETEROSCOPY, WITH HOLMIUM LASER LITHOTRIPSY OF URETERAL CALCULUS AND STENT INSERTION Left 6/17/2024    Procedure: Cystoscopy, left retrograde pyelogram, left ureteroscopy with holmium laser lithotripsy, stone basket extraction, left JJ stent;  Surgeon: Humza Saenz MD;  Location: [facility];  Service: Urology;  Laterality: Left;  Element    CYSTOURETEROSCOPY, WITH HOLMIUM LASER LITHOTRIPSY OF URETERAL CALCULUS AND STENT INSERTION Right 10/14/2024    Procedure: CYSTOURETEROSCOPY, WITH HOLMIUM LASER LITHOTRIPSY OF URETERAL CALCULUS AND STENT INSERTION;  Surgeon: Humza Saenz MD;  Location: [facility];  Service: Urology;  Laterality: Right;  LMA    EXTRACTION - STONE Left 6/17/2024    Procedure: EXTRACTION - STONE;  Surgeon: Humza Saenz MD;  Location: [facility] OR;  Service: Urology;  Laterality: Left;    RETROGRADE PYELOGRAPHY N/A 6/7/2024    Procedure: PYELOGRAM, RETROGRADE;  Surgeon: Humza Saenz MD;  Location: [facility] OR;  Service: Urology;  Laterality: N/A;    RETROGRADE PYELOGRAPHY N/A 6/17/2024    Procedure: PYELOGRAM, RETROGRADE;  Surgeon: Humza Saenz MD;  Location: [facility] OR;  Service: Urology;  Laterality: N/A;    RETROGRADE PYELOGRAPHY  10/14/2024    Procedure: PYELOGRAM, RETROGRADE;  Surgeon: Humza Saenz MD;  Location: [facility] OR;  Service: Urology;;       No family history on file.    Social History[1]    Current Medications[2]    Review of patient's allergies indicates:   Allergen Reactions    Sulfa (sulfonamide antibiotics)             Vitals:    04/09/25 0757   BP: (!) 157/81   Pulse: 76   Resp: 18   Temp: 97.8 °F (36.6 °C)   TempSrc: Oral   SpO2: 98%   Weight: 90.2 kg (198 lb 13.7 oz)   Height: 5' 8" (1.727 m)   Body mass index is 30.24 kg/m².    Physical Exam  Constitutional:       General: She is not in acute distress.  Eyes:      General: No scleral icterus.  Cardiovascular:      Rate and Rhythm: Normal rate.   Pulmonary:      " "Effort: Pulmonary effort is normal.   Abdominal:      General: Abdomen is flat. There is no distension.      Palpations: Abdomen is soft. There is no fluid wave, hepatomegaly or splenomegaly.      Tenderness: There is no abdominal tenderness.   Musculoskeletal:      Right lower leg: No edema.      Left lower leg: No edema.   Skin:     General: Skin is warm.      Comments: No spider angiomas.    Neurological:      General: No focal deficit present.      Mental Status: She is alert and oriented to person, place, and time.   Psychiatric:         Behavior: Behavior is cooperative.         Thought Content: Thought content normal.         LABS: I personally reviewed pertinent laboratory findings.    Lab Results   Component Value Date    WBC 9.41 03/06/2025    HGB 13.8 03/06/2025    HCT 42.4 03/06/2025    MCV 90 03/06/2025     03/06/2025       Lab Results   Component Value Date     03/06/2025    K 4.0 03/06/2025     03/06/2025    CO2 21 (L) 03/06/2025    BUN 14 03/06/2025    CREATININE 1.1 03/06/2025    CALCIUM 9.7 03/06/2025    ANIONGAP 12 03/06/2025       Lab Results   Component Value Date    ALT 10 03/06/2025    AST 11 03/06/2025    ALKPHOS 128 03/06/2025    BILITOT 0.3 03/06/2025       Lab Results   Component Value Date    HEPCAB Non-reactive 12/09/2024       No results found for: "MILLIE", "MITOAB", "SMOOTHMUSCAB", "IGG", "CERULOPLSM"     Computed MELD 3.0 unavailable. One or more values for this score either were not found within the given timeframe or did not fit some other criterion.  Computed MELD-Na unavailable. One or more values for this score either were not found within the given timeframe or did not fit some other criterion.       IMAGING: I personally reviewed imaging studies.      Assessment: Josselyn Tarango is a 59 y.o. female with fatty liver, prior heavy alcohol use, obesity, prediabetes, HLD, who was referred to Hepatology Clinic for liver lesion. Per chart review, liver enzymes and " bilirubin have been within normal limits dating back to June 2024 in our system. CT abd with contrast (2/13/25) showed the liver is mildly enlarged, indeterminate lesion at the right hepatic lobe measuring 1.8 x 1.4 cm, spleen is normal in size.     Indeterminate liver lesion. Plan to check tumor markers, non-invasive measures of fibrosis, dedicated imaging of the liver.    1. Hypodense mass of liver    2. Abnormal finding on imaging of liver    3. Liver disease, unspecified    4. History of alcohol use      Recommendations:  - LFTs, INR  - HAV IgG, HBcTotal, HBsAb  - AFP, CA 19-9   - MRI abd w/wo contrast next available  - FibroScan today     Return to clinic in 3 months.    I have sent communication to the referring physician and/or primary care provider.    Christel Coleman MD  Staff Physician  Hepatology and Liver Transplant  [facility]  [facility] [place]           [1]   Social History  Tobacco Use    Smoking status: Every Day     Current packs/day: 1.00     Average packs/day: 1 pack/day for 45.3 years (45.3 ttl pk-yrs)     Types: Cigarettes     Start date: 1980     Passive exposure: Current    Smokeless tobacco: Never    Tobacco comments:     Smoking cessation education note: Pt is a 1 pk/day cigarette smoker x 45 yrs. 21 mg nicotine patch ordered Q day. Pt states not ready to quit. Handout provided.    Substance Use Topics    Alcohol use: Not Currently    Drug use: Never   [2]   Current Outpatient Medications   Medication Sig Dispense Refill    acetaminophen 325 mg Cap Take 650 mg by mouth.      aspirin 325 MG tablet Take 650 mg by mouth.      atorvastatin (LIPITOR) 40 MG tablet Take 0.5 tablets (20 mg total) by mouth once daily. 45 tablet 3    cephALEXin (KEFLEX) 500 MG capsule Take 1 capsule (500 mg total) by mouth 2 (two) times daily. for 7 days 14 capsule 0    estradioL (ESTRACE) 0.01 % (0.1 mg/gram) vaginal cream Place 1 g vaginally once daily. 42.5 g 3     fluticasone propionate (FLONASE) 50 mcg/actuation nasal spray 1 spray (50 mcg total) by Each Nostril route once daily. 15.8 mL 1    incontinence pad, liner, disp Pads 1 Bag by Misc.(Non-Drug; Combo Route) route every 14 (fourteen) days. 40 each 11    mirabegron (MYRBETRIQ) 50 mg Tb24 Take 1 tablet (50 mg total) by mouth once daily. 30 tablet 11    nitrofurantoin, macrocrystal-monohydrate, (MACROBID) 100 MG capsule Take 1 capsule by mouth 2 (two) times daily.      nystatin (MYCOSTATIN) powder Apply topically 2 (two) times daily. 15 g 0    prednisoLONE acetate (PRED FORTE) 1 % DrpS SMARTSI Drop(s) In Eye(s) 6 Times Daily       No current facility-administered medications for this visit.

## 2025-04-09 NOTE — PROCEDURES
FibroScan Transplant Hepatology    Date/Time: 4/9/2025 10:00 AM    Performed by: Blake Reid MA  Authorized by: Christel Coleman MD    Diagnosis:  Alcohol and NAFLD    Probe:  M    Universal Protocol: Patient's identity, procedure and site were verified, confirmatory pause was performed.  Discussed procedure including risks and potential complications.  Questions answered.  Patient verbalizes understanding and wishes to proceed with VCTE.     Procedure: After providing explanations of the procedure, patient was placed in the supine position with right arm in maximum abduction to allow optimal exposure of right lateral abdomen.  Patient was briefly assessed, Testing was performed in the mid-axillary location, 50Hz Shear Wave pulses were applied and the resulting Shear Wave and Propagation Speed detected with a 3.5 MHz ultrasonic signal, using the FibroScan probe, Skin to liver capsule distance and liver parenchyma were accessed during the entire examination with the FibroScan probe, Patient was instructed to breathe normally and to abstain from sudden movements during the procedure, allowing for random measurements of liver stiffness. At least 10 Shear Waves were produced, Individual measurements of each Shear Wave were calculated.  Patient tolerated the procedure well with no complications.  Meets discharge criteria as was dismissed.  Rates pain 0 out of 10.  Patient will follow up with ordering provider to review results.    Findings  Median liver stiffness score:  4.3  CAP Reading: dB/m:  204    IQR/med %:  5  Interpretation  Fibrosis interpretation is based on medial liver stiffness - Kilopascal (kPa).    Fibrosis Stage:  F 0-1  Steatosis interpretation is based on controlled attenuation parameter - (dB/m).    Steatosis Grade:  <S1

## 2025-04-15 ENCOUNTER — TELEPHONE (OUTPATIENT)
Dept: UROGYNECOLOGY | Facility: CLINIC | Age: 60
End: 2025-04-15
Payer: MEDICAID

## 2025-04-21 ENCOUNTER — HOSPITAL ENCOUNTER (OUTPATIENT)
Dept: RADIOLOGY | Facility: HOSPITAL | Age: 60
Discharge: HOME | End: 2025-04-21
Attending: INTERNAL MEDICINE
Payer: MEDICAID

## 2025-04-21 DIAGNOSIS — K76.9 LIVER DISEASE, UNSPECIFIED: ICD-10-CM

## 2025-04-21 DIAGNOSIS — R16.0 HYPODENSE MASS OF LIVER: ICD-10-CM

## 2025-04-21 PROCEDURE — 25500020 PHARM REV CODE 255: Mod: PN | Performed by: INTERNAL MEDICINE

## 2025-04-21 PROCEDURE — A9585 GADOBUTROL INJECTION: HCPCS | Mod: PN | Performed by: INTERNAL MEDICINE

## 2025-04-21 PROCEDURE — 74183 MRI ABD W/O CNTR FLWD CNTR: CPT | Mod: TC,PN

## 2025-04-21 PROCEDURE — 74183 MRI ABD W/O CNTR FLWD CNTR: CPT | Mod: 26,,, | Performed by: RADIOLOGY

## 2025-04-21 RX ORDER — GADOBUTROL 604.72 MG/ML
9 INJECTION INTRAVENOUS
Status: COMPLETED | OUTPATIENT
Start: 2025-04-21 | End: 2025-04-21

## 2025-04-21 RX ADMIN — GADOBUTROL 9 ML: 604.72 INJECTION INTRAVENOUS at 02:04

## 2025-04-22 ENCOUNTER — OFFICE VISIT (OUTPATIENT)
Dept: SURGERY | Facility: CLINIC | Age: 60
End: 2025-04-22
Payer: MEDICAID

## 2025-04-22 VITALS
BODY MASS INDEX: 29.77 KG/M2 | WEIGHT: 196.44 LBS | SYSTOLIC BLOOD PRESSURE: 165 MMHG | HEART RATE: 92 BPM | HEIGHT: 68 IN | DIASTOLIC BLOOD PRESSURE: 77 MMHG

## 2025-04-22 DIAGNOSIS — L05.91 PILONIDAL CYST: Primary | ICD-10-CM

## 2025-04-22 DIAGNOSIS — Z72.0 TOBACCO ABUSE: ICD-10-CM

## 2025-04-22 PROCEDURE — 99203 OFFICE O/P NEW LOW 30 MIN: CPT | Mod: S$PBB,,, | Performed by: COLON & RECTAL SURGERY

## 2025-04-22 PROCEDURE — 3066F NEPHROPATHY DOC TX: CPT | Mod: CPTII,,, | Performed by: COLON & RECTAL SURGERY

## 2025-04-22 PROCEDURE — 3077F SYST BP >= 140 MM HG: CPT | Mod: CPTII,,, | Performed by: COLON & RECTAL SURGERY

## 2025-04-22 PROCEDURE — 1159F MED LIST DOCD IN RCRD: CPT | Mod: CPTII,,, | Performed by: COLON & RECTAL SURGERY

## 2025-04-22 PROCEDURE — 1160F RVW MEDS BY RX/DR IN RCRD: CPT | Mod: CPTII,,, | Performed by: COLON & RECTAL SURGERY

## 2025-04-22 PROCEDURE — 3078F DIAST BP <80 MM HG: CPT | Mod: CPTII,,, | Performed by: COLON & RECTAL SURGERY

## 2025-04-22 PROCEDURE — 3061F NEG MICROALBUMINURIA REV: CPT | Mod: CPTII,,, | Performed by: COLON & RECTAL SURGERY

## 2025-04-22 PROCEDURE — 99215 OFFICE O/P EST HI 40 MIN: CPT | Mod: PBBFAC | Performed by: COLON & RECTAL SURGERY

## 2025-04-22 PROCEDURE — 3044F HG A1C LEVEL LT 7.0%: CPT | Mod: CPTII,,, | Performed by: COLON & RECTAL SURGERY

## 2025-04-22 PROCEDURE — 99999 PR PBB SHADOW E&M-EST. PATIENT-LVL V: CPT | Mod: PBBFAC,,, | Performed by: COLON & RECTAL SURGERY

## 2025-04-22 PROCEDURE — 3008F BODY MASS INDEX DOCD: CPT | Mod: CPTII,,, | Performed by: COLON & RECTAL SURGERY

## 2025-04-22 NOTE — PROGRESS NOTES
CRS Office Visit History and Physical    Referring Md:   Self, Aaareferral  No address on file    SUBJECTIVE:     Chief Complaint: pilonidal cyst    History of Present Illness:  The patient is a new patient to this practice.   Course is as follows:  Patient is a 59 y.o. female presents with pilonidal cyst    She had a prior pilonidal excision and primary closure at age 15.  She did well for 20 years afterwards.  Starting in her early 40s, she started to have recurrent swelling and intermittent drainage of purulence mixed with intermittent bleeding.  She now presents due to increasing pain prompting her visit.  She is an active smoker and rolls her own cigarettes.  She believes she smokes roughly 1 pack per day.  Nondiabetic.  Prior stroke 10 years ago that is fully recovered.  No prior heart attack.  Currently on an aspirin daily.    Last Colonoscopy: 2.6/2025  Impression:            - The examined portion of the ileum was normal.                          - Diverticulosis in the recto-sigmoid colon, in                          the sigmoid colon, in the descending colon, in the                          transverse colon and in the ascending colon.                          - Non-bleeding internal hemorrhoids.                          - One 2 mm polyp in the cecum, removed with a cold                          snare. Resected and retrieved.  --> tubular adenoma                         - One 1 mm polyp in the transverse colon, removed                          with a cold snare. Resected and retrieved.  --> tubular adenoma                         - One 12 mm polyp in the sigmoid colon, removed                          with a hot snare. Resected and retrieved.  --> tubular adenoma                         Injected. Clips were placed     Review of patient's allergies indicates:   Allergen Reactions    Sulfa (sulfonamide antibiotics)        Past Medical History:   Diagnosis Date    Allergy     Bilateral flank pain 01/24/2025     Diabetes mellitus     diet control    Digestive disorder     Hx: gastric ulcer    Kidney stone     Urinary tract infection      Past Surgical History:   Procedure Laterality Date    COLONOSCOPY, SCREENING, HIGH RISK PATIENT N/A 2/6/2025    Procedure: COLONOSCOPY, SCREENING, HIGH RISK PATIENT;  Surgeon: Pino Cuellar MD;  Location: [address]);  Service: Endoscopy;  Laterality: N/A;  ref by Ryder Nichols MD, peg, portal-ae  Jm/change MD from Rodrigo to /adjust to fellow rm case lengths  1/30 pre call complete/mleone    CYSTOSCOPY W/ URETERAL STENT PLACEMENT Left 6/7/2024    Procedure: CYSTOSCOPY, left retrograde pyelogram, left JJ stent;  Surgeon: Humza Saenz MD;  Location: [facility];  Service: Urology;  Laterality: Left;  MAC vs choice    CYSTOURETEROSCOPY, WITH HOLMIUM LASER LITHOTRIPSY OF URETERAL CALCULUS AND STENT INSERTION Left 6/17/2024    Procedure: Cystoscopy, left retrograde pyelogram, left ureteroscopy with holmium laser lithotripsy, stone basket extraction, left JJ stent;  Surgeon: Humza Saenz MD;  Location: [facility];  Service: Urology;  Laterality: Left;  Element    CYSTOURETEROSCOPY, WITH HOLMIUM LASER LITHOTRIPSY OF URETERAL CALCULUS AND STENT INSERTION Right 10/14/2024    Procedure: CYSTOURETEROSCOPY, WITH HOLMIUM LASER LITHOTRIPSY OF URETERAL CALCULUS AND STENT INSERTION;  Surgeon: Humza Saenz MD;  Location: [facility] OR;  Service: Urology;  Laterality: Right;  LMA    EXTRACTION - STONE Left 6/17/2024    Procedure: EXTRACTION - STONE;  Surgeon: Humza Saenz MD;  Location: [facility] OR;  Service: Urology;  Laterality: Left;    RETROGRADE PYELOGRAPHY N/A 6/7/2024    Procedure: PYELOGRAM, RETROGRADE;  Surgeon: Humza Saenz MD;  Location: [facility] OR;  Service: Urology;  Laterality: N/A;    RETROGRADE PYELOGRAPHY N/A 6/17/2024    Procedure: PYELOGRAM, RETROGRADE;  Surgeon: Humza Saenz MD;  Location: [facility] OR;  Service: Urology;   "Laterality: N/A;    RETROGRADE PYELOGRAPHY  10/14/2024    Procedure: PYELOGRAM, RETROGRADE;  Surgeon: Hmuza Saenz MD;  Location: [facility];  Service: Urology;;     No family history on file.  Social History[1]     Review of Systems:  Review of Systems   Constitutional:  Negative for chills, diaphoresis, fever, malaise/fatigue and weight loss.   HENT:  Negative for congestion.    Respiratory:  Positive for shortness of breath.    Cardiovascular:  Negative for chest pain and leg swelling.   Gastrointestinal:  Negative for abdominal pain, blood in stool, constipation, nausea and vomiting.   Genitourinary:  Negative for dysuria.   Musculoskeletal:  Positive for back pain. Negative for myalgias.   Skin:  Positive for rash.   Neurological:  Negative for dizziness and weakness.   Endo/Heme/Allergies:  Does not bruise/bleed easily.   Psychiatric/Behavioral:  Negative for depression.        OBJECTIVE:     Vital Signs (Most Recent)  BP (!) 165/77   Pulse 92   Ht 5' 8" (1.727 m)   Wt 89.1 kg (196 lb 6.9 oz)   BMI 29.87 kg/m²     Physical Exam:  General: White female in no distress   Neuro: alert and oriented x 4.  Moves all extremities.     HEENT: no icterus.  Trachea midline  Respiratory: respirations are even and unlabored  Cardiac: regular rate  Abdomen:  Soft, nontender, no masses  Extremities: Warm dry and intact  Skin: no rashes  Anorectal: 9cm x 2cm area of pilonidal disease 5-6 cm above the anal verge.  No significant surrounding hair.  Deep cleft posterior midline.    Labs: H&H 14 and 42.  INR normal.  Alb 3.5.  Creatinine 1.1.    Imaging:   CT abd pelvis 1/29/25 reviewed with midline presacral cyst c/w pilonidal disease      ASSESSMENT/PLAN:     Diagnoses and all orders for this visit:    Pilonidal cyst  -     Ambulatory referral/consult to Colorectal Surgery  -     Case Request Operating Room: REPAIR,WOUND,USING FLAP,AFTER PILONIDAL CYST PROCEDURE, EXCISION, PILONIDAL CYST    Tobacco abuse        59 y.o. " female with recurrent pilonidal disease with a history of primary excision and closure almost  40 years ago.  On exam, she has a 9 x 2 cm area of pilonidal disease.  This fits with her CT findings as well.  She does have an additional abscess in her upper back that is well away from the pilonidal disease does not seem related.  Discussed options of excision with marsupialization versus excision with cleft lift rotational flap.  She does appear to be a good candidate for cleft lift rotational flap from a technical standpoint.  We discussed that her smoking increases her risk of significant wound complication.  She is aware and wishes to proceed with cleft lift rotational flap.  Consents were signed today and all questions answered.  She understands the risk of wound breakdown, recurrent infection, pain, bleeding, need for additional surgery.    KAYLEE Lawler MD, FACS, FASCRS  Staff Surgeon  Colon & Rectal Surgery           [1]   Social History  Tobacco Use    Smoking status: Every Day     Current packs/day: 1.00     Average packs/day: 1 pack/day for 45.3 years (45.3 ttl pk-yrs)     Types: Cigarettes     Start date: 1980     Passive exposure: Current    Smokeless tobacco: Never    Tobacco comments:     Smoking cessation education note: Pt is a 1 pk/day cigarette smoker x 45 yrs. 21 mg nicotine patch ordered Q day. Pt states not ready to quit. Handout provided.    Substance Use Topics    Alcohol use: Not Currently    Drug use: Never

## 2025-05-02 ENCOUNTER — TELEPHONE (OUTPATIENT)
Dept: SURGERY | Facility: CLINIC | Age: 60
End: 2025-05-02
Payer: MEDICAID

## 2025-05-02 ENCOUNTER — PATIENT MESSAGE (OUTPATIENT)
Dept: WOUND CARE | Facility: CLINIC | Age: 60
End: 2025-05-02
Payer: MEDICAID

## 2025-05-02 NOTE — TELEPHONE ENCOUNTER
Spoke with patient and explained that I am not familiar with ointment to use for pilonidal area. Explained that I will send a message to Inquire and message back via portal. Patient verbalizes understanding.Message sent to NPs and Dr. Lawler to advise.

## 2025-05-02 NOTE — TELEPHONE ENCOUNTER
----- Message from Tracy sent at 5/2/2025 12:15 PM CDT -----  Type:  Needs Medical AdviceWho Called: Ms Arcos Would the patient rather a call back or a response via [facility]? Call Best Call Back Number:  [phone] Information: She is asking for advise on if she can use the ointment that she has (ichthammol 10% solution) please give her a call back or you can send a message through the portal

## 2025-05-16 ENCOUNTER — HOSPITAL ENCOUNTER (EMERGENCY)
Facility: HOSPITAL | Age: 60
Discharge: HOME | End: 2025-05-16
Attending: EMERGENCY MEDICINE
Payer: MEDICAID

## 2025-05-16 VITALS
OXYGEN SATURATION: 100 % | DIASTOLIC BLOOD PRESSURE: 82 MMHG | BODY MASS INDEX: 30.31 KG/M2 | TEMPERATURE: 99 F | SYSTOLIC BLOOD PRESSURE: 149 MMHG | HEART RATE: 59 BPM | RESPIRATION RATE: 20 BRPM | HEIGHT: 68 IN | WEIGHT: 200 LBS

## 2025-05-16 DIAGNOSIS — R51.9 NONINTRACTABLE HEADACHE, UNSPECIFIED CHRONICITY PATTERN, UNSPECIFIED HEADACHE TYPE: Primary | ICD-10-CM

## 2025-05-16 DIAGNOSIS — R42 LIGHTHEADED: ICD-10-CM

## 2025-05-16 DIAGNOSIS — N30.00 ACUTE CYSTITIS WITHOUT HEMATURIA: ICD-10-CM

## 2025-05-16 LAB
ABSOLUTE EOSINOPHIL (OHS): 0.19 K/UL
ABSOLUTE MONOCYTE (OHS): 0.36 K/UL (ref 0.3–1)
ABSOLUTE NEUTROPHIL COUNT (OHS): 3.93 K/UL (ref 1.8–7.7)
ALBUMIN SERPL BCP-MCNC: 3.8 G/DL (ref 3.5–5.2)
ALP SERPL-CCNC: 103 UNIT/L (ref 40–150)
ALT SERPL W/O P-5'-P-CCNC: 9 UNIT/L (ref 10–44)
ANION GAP (OHS): 8 MMOL/L (ref 8–16)
AST SERPL-CCNC: 11 UNIT/L (ref 11–45)
BACTERIA #/AREA URNS AUTO: ABNORMAL /HPF
BASOPHILS # BLD AUTO: 0.05 K/UL
BASOPHILS NFR BLD AUTO: 0.7 %
BILIRUB SERPL-MCNC: 0.3 MG/DL (ref 0.1–1)
BILIRUB UR QL STRIP.AUTO: NEGATIVE
BUN SERPL-MCNC: 14 MG/DL (ref 6–20)
CALCIUM SERPL-MCNC: 9.4 MG/DL (ref 8.7–10.5)
CHLORIDE SERPL-SCNC: 110 MMOL/L (ref 95–110)
CLARITY UR: CLEAR
CO2 SERPL-SCNC: 24 MMOL/L (ref 23–29)
COLOR UR AUTO: COLORLESS
CREAT SERPL-MCNC: 1.1 MG/DL (ref 0.5–1.4)
CRP SERPL-MCNC: 9.4 MG/L
ERYTHROCYTE [DISTWIDTH] IN BLOOD BY AUTOMATED COUNT: 14.4 % (ref 11.5–14.5)
GFR SERPLBLD CREATININE-BSD FMLA CKD-EPI: 58 ML/MIN/1.73/M2
GLUCOSE SERPL-MCNC: 83 MG/DL (ref 70–110)
GLUCOSE UR QL STRIP: NEGATIVE
HCT VFR BLD AUTO: 41.7 % (ref 37–48.5)
HGB BLD-MCNC: 13.7 GM/DL (ref 12–16)
HGB UR QL STRIP: NEGATIVE
HOLD SPECIMEN: NORMAL
IMM GRANULOCYTES # BLD AUTO: 0.01 K/UL (ref 0–0.04)
IMM GRANULOCYTES NFR BLD AUTO: 0.1 % (ref 0–0.5)
KETONES UR QL STRIP: NEGATIVE
LEUKOCYTE ESTERASE UR QL STRIP: ABNORMAL
LYMPHOCYTES # BLD AUTO: 2.3 K/UL (ref 1–4.8)
MAGNESIUM SERPL-MCNC: 2.1 MG/DL (ref 1.6–2.6)
MCH RBC QN AUTO: 29.5 PG (ref 27–31)
MCHC RBC AUTO-ENTMCNC: 32.9 G/DL (ref 32–36)
MCV RBC AUTO: 90 FL (ref 82–98)
MICROSCOPIC COMMENT: ABNORMAL
NITRITE UR QL STRIP: NEGATIVE
NUCLEATED RBC (/100WBC) (OHS): 0 /100 WBC
PH UR STRIP: 7 [PH]
PLATELET # BLD AUTO: 284 K/UL (ref 150–450)
PMV BLD AUTO: 9.9 FL (ref 9.2–12.9)
POCT GLUCOSE: 97 MG/DL (ref 70–110)
POTASSIUM SERPL-SCNC: 4.1 MMOL/L (ref 3.5–5.1)
PROT SERPL-MCNC: 7.7 GM/DL (ref 6–8.4)
PROT UR QL STRIP: NEGATIVE
RBC # BLD AUTO: 4.65 M/UL (ref 4–5.4)
RBC #/AREA URNS AUTO: <1 /HPF (ref 0–4)
RELATIVE EOSINOPHIL (OHS): 2.8 %
RELATIVE LYMPHOCYTE (OHS): 33.6 % (ref 18–48)
RELATIVE MONOCYTE (OHS): 5.3 % (ref 4–15)
RELATIVE NEUTROPHIL (OHS): 57.5 % (ref 38–73)
SODIUM SERPL-SCNC: 142 MMOL/L (ref 136–145)
SP GR UR STRIP: 1.01
SQUAMOUS #/AREA URNS AUTO: <1 /HPF
UROBILINOGEN UR STRIP-ACNC: NEGATIVE EU/DL
WBC # BLD AUTO: 6.84 K/UL (ref 3.9–12.7)
WBC #/AREA URNS AUTO: 27 /HPF (ref 0–5)

## 2025-05-16 PROCEDURE — 25500020 PHARM REV CODE 255: Performed by: EMERGENCY MEDICINE

## 2025-05-16 PROCEDURE — 83735 ASSAY OF MAGNESIUM: CPT | Performed by: PHYSICIAN ASSISTANT

## 2025-05-16 PROCEDURE — 93010 ELECTROCARDIOGRAM REPORT: CPT | Mod: ,,, | Performed by: STUDENT IN AN ORGANIZED HEALTH CARE EDUCATION/TRAINING PROGRAM

## 2025-05-16 PROCEDURE — 63600175 PHARM REV CODE 636 W HCPCS: Mod: JZ,TB | Performed by: PHYSICIAN ASSISTANT

## 2025-05-16 PROCEDURE — 81001 URINALYSIS AUTO W/SCOPE: CPT | Performed by: PHYSICIAN ASSISTANT

## 2025-05-16 PROCEDURE — 82962 GLUCOSE BLOOD TEST: CPT

## 2025-05-16 PROCEDURE — 86140 C-REACTIVE PROTEIN: CPT | Performed by: PHYSICIAN ASSISTANT

## 2025-05-16 PROCEDURE — 99285 EMERGENCY DEPT VISIT HI MDM: CPT | Mod: 25

## 2025-05-16 PROCEDURE — 87186 SC STD MICRODIL/AGAR DIL: CPT | Performed by: PHYSICIAN ASSISTANT

## 2025-05-16 PROCEDURE — 85025 COMPLETE CBC W/AUTO DIFF WBC: CPT | Performed by: PHYSICIAN ASSISTANT

## 2025-05-16 PROCEDURE — 25000003 PHARM REV CODE 250: Performed by: PHYSICIAN ASSISTANT

## 2025-05-16 PROCEDURE — 96361 HYDRATE IV INFUSION ADD-ON: CPT

## 2025-05-16 PROCEDURE — 93005 ELECTROCARDIOGRAM TRACING: CPT

## 2025-05-16 PROCEDURE — 96374 THER/PROPH/DIAG INJ IV PUSH: CPT

## 2025-05-16 PROCEDURE — 82310 ASSAY OF CALCIUM: CPT | Performed by: PHYSICIAN ASSISTANT

## 2025-05-16 RX ORDER — CEPHALEXIN 500 MG/1
500 CAPSULE ORAL EVERY 12 HOURS
Qty: 14 CAPSULE | Refills: 0 | Status: SHIPPED | OUTPATIENT
Start: 2025-05-16 | End: 2025-05-23

## 2025-05-16 RX ORDER — KETOROLAC TROMETHAMINE 30 MG/ML
15 INJECTION, SOLUTION INTRAMUSCULAR; INTRAVENOUS
Status: COMPLETED | OUTPATIENT
Start: 2025-05-16 | End: 2025-05-16

## 2025-05-16 RX ORDER — BUTALBITAL, ACETAMINOPHEN AND CAFFEINE 50; 325; 40 MG/1; MG/1; MG/1
1 TABLET ORAL EVERY 6 HOURS PRN
Qty: 6 TABLET | Refills: 0 | Status: SHIPPED | OUTPATIENT
Start: 2025-05-16

## 2025-05-16 RX ORDER — PROPARACAINE HYDROCHLORIDE 5 MG/ML
2 SOLUTION/ DROPS OPHTHALMIC
Status: COMPLETED | OUTPATIENT
Start: 2025-05-16 | End: 2025-05-16

## 2025-05-16 RX ADMIN — SODIUM CHLORIDE 1000 ML: 9 INJECTION, SOLUTION INTRAVENOUS at 01:05

## 2025-05-16 RX ADMIN — KETOROLAC TROMETHAMINE 15 MG: 30 INJECTION, SOLUTION INTRAMUSCULAR; INTRAVENOUS at 02:05

## 2025-05-16 RX ADMIN — IOHEXOL 100 ML: 350 INJECTION, SOLUTION INTRAVENOUS at 01:05

## 2025-05-16 RX ADMIN — FLUORESCEIN SODIUM 1 EACH: 1 STRIP OPHTHALMIC at 12:05

## 2025-05-16 RX ADMIN — PROPARACAINE HYDROCHLORIDE 2 DROP: 5 SOLUTION/ DROPS OPHTHALMIC at 12:05

## 2025-05-16 NOTE — ED NOTES
Pt brought in by [facility] from home. Pt reports headache x 2 days with bilateral numbness below the knees. Pt states swelling to right eye this morning without visual changes. Pt recently had cataract surgery in both eyes; right in February, left in March. Pt states she feels off balance but able to ambulate independently. Pt had dizziness yesterday. Speech clear, no deficits.

## 2025-05-16 NOTE — ED PROVIDER NOTES
"Encounter Date: 5/16/2025       History     Chief Complaint   Patient presents with    Headache     Intermittent dizziness x 2 days with numbness to BLE. This morning woke up with pain and swelling to right eye with no h/o injury. Denies visual change. On arrival, awake, alert. Speech clear with no deficits noted.      59-year-old female presents to ED with multiple concerns.  Patient reports overnight she noticed she felt "unsteady" while getting up to use restroom.  Denying dizziness or sensation of room spinning but describes symptoms as unsteady balance.  Then when she woke up this morning around 8:00 a.m. she had a severe headache described as generalized, dull and severity 9/10.  She does have history of migraines but reports her current headache does not resemble previous migraines.  Headache is not "worst headache ever" or described as thunderclap.  She did take BC powder for her headache with mild improvement.  She then reports she had sudden onset of right eye pain after she got water in her eye during her shower with brief blurred vision.  Visual changes have resolved.  No facial droop or focal weaknesses reported with no fevers, chills, recent sicknesses, neck stiffness, chest pain, cough, shortness of breath, acute abdominal pain or urinary/bowel complaints.  No other acute complaints at this time    The history is provided by the patient.     Review of patient's allergies indicates:   Allergen Reactions    Sulfa (sulfonamide antibiotics)      Past Medical History:   Diagnosis Date    Allergy     Bilateral flank pain 01/24/2025    Diabetes mellitus     diet control    Digestive disorder     Hx: gastric ulcer    Kidney stone     Urinary tract infection      Past Surgical History:   Procedure Laterality Date    COLONOSCOPY, SCREENING, HIGH RISK PATIENT N/A 2/6/2025    Procedure: COLONOSCOPY, SCREENING, HIGH RISK PATIENT;  Surgeon: Pino Cuellar MD;  Location: [facility] ([address] [place] [address]);  Service: Endoscopy; "  Laterality: N/A;  ref by Ryder Nichols MD, peg, portal-ae  Jm/change MD from Wallace to /adjust to fellow rm case lengths  1/30 pre call complete/mleone    CYSTOSCOPY W/ URETERAL STENT PLACEMENT Left 6/7/2024    Procedure: CYSTOSCOPY, left retrograde pyelogram, left JJ stent;  Surgeon: Humza Saenz MD;  Location: [facility] OR;  Service: Urology;  Laterality: Left;  MAC vs choice    CYSTOURETEROSCOPY, WITH HOLMIUM LASER LITHOTRIPSY OF URETERAL CALCULUS AND STENT INSERTION Left 6/17/2024    Procedure: Cystoscopy, left retrograde pyelogram, left ureteroscopy with holmium laser lithotripsy, stone basket extraction, left JJ stent;  Surgeon: Humza Saenz MD;  Location: [facility] OR;  Service: Urology;  Laterality: Left;  Element    CYSTOURETEROSCOPY, WITH HOLMIUM LASER LITHOTRIPSY OF URETERAL CALCULUS AND STENT INSERTION Right 10/14/2024    Procedure: CYSTOURETEROSCOPY, WITH HOLMIUM LASER LITHOTRIPSY OF URETERAL CALCULUS AND STENT INSERTION;  Surgeon: Humza Saenz MD;  Location: [facility] OR;  Service: Urology;  Laterality: Right;  LMA    EXTRACTION - STONE Left 6/17/2024    Procedure: EXTRACTION - STONE;  Surgeon: Humza Saenz MD;  Location: [facility] OR;  Service: Urology;  Laterality: Left;    RETROGRADE PYELOGRAPHY N/A 6/7/2024    Procedure: PYELOGRAM, RETROGRADE;  Surgeon: Humza Saenz MD;  Location: [facility];  Service: Urology;  Laterality: N/A;    RETROGRADE PYELOGRAPHY N/A 6/17/2024    Procedure: PYELOGRAM, RETROGRADE;  Surgeon: Humza Saenz MD;  Location: [facility];  Service: Urology;  Laterality: N/A;    RETROGRADE PYELOGRAPHY  10/14/2024    Procedure: PYELOGRAM, RETROGRADE;  Surgeon: Humza Saenz MD;  Location: [facility];  Service: Urology;;     No family history on file.  Social History[1]  Review of Systems   Constitutional:  Negative for chills and fever.   HENT:  Negative for congestion, ear pain and sore throat.    Eyes:  Positive for pain and visual  disturbance (Resolved). Negative for photophobia and redness.   Respiratory:  Negative for cough and shortness of breath.    Cardiovascular:  Negative for chest pain.   Gastrointestinal:  Negative for nausea and vomiting.   Musculoskeletal:  Negative for neck pain and neck stiffness.   Neurological:  Positive for light-headedness and headaches. Negative for seizures, syncope and weakness.       Physical Exam     Initial Vitals [05/16/25 1034]   BP Pulse Resp Temp SpO2   (!) 184/92 75 18 98.7 °F (37.1 °C) 100 %      MAP       --         Physical Exam    Vitals reviewed.  Constitutional: She appears well-developed and well-nourished. She is active. She does not have a sickly appearance. She does not appear ill. No distress.   HENT:   Head: Normocephalic and atraumatic.   No temporal artery tenderness.  No pain with jaw movement   Eyes:   No obvious swelling noted with no periorbital erythema or tenderness.  No pain with EOM.  PERRL.  No visible foreign body.  Further evaluation with fluorescent stain noting no obvious corneal abrasion.  Right IOP 26, 27, 27.  Left IOP 26, 27, 26   Neck:   Normal range of motion.  Cardiovascular:  Normal rate, regular rhythm and normal heart sounds.           Pulmonary/Chest: Effort normal and breath sounds normal.   Musculoskeletal:      Cervical back: Normal range of motion. No rigidity.     Neurological: She is alert. GCS eye subscore is 4. GCS verbal subscore is 5. GCS motor subscore is 6.   CN 2-12 appearing grossly intact.  No facial droop or slurred speech.  Appropriate coordination with finger-to-nose testing.  No drift.  Appropriate sensation and strength to all extremities   Skin: Skin is warm and dry.   Psychiatric: She has a normal mood and affect. Her speech is normal and behavior is normal.         ED Course   Procedures  Labs Reviewed   COMPREHENSIVE METABOLIC PANEL - Abnormal       Result Value    Sodium 142      Potassium 4.1      Chloride 110      CO2 24      Glucose  83      BUN 14      Creatinine 1.1      Calcium 9.4      Protein Total 7.7      Albumin 3.8      Bilirubin Total 0.3            AST 11      ALT 9 (*)     Anion Gap 8      eGFR 58 (*)    URINALYSIS, REFLEX TO URINE CULTURE - Abnormal    Color, UA Colorless (*)     Appearance, UA Clear      pH, UA 7.0      Spec Grav UA 1.010      Protein, UA Negative      Glucose, UA Negative      Ketones, UA Negative      Bilirubin, UA Negative      Blood, UA Negative      Nitrites, UA Negative      Urobilinogen, UA Negative      Leukocyte Esterase, UA 1+ (*)    C-REACTIVE PROTEIN - Abnormal    CRP 9.4 (*)    URINALYSIS MICROSCOPIC - Abnormal    RBC, UA <1      WBC, UA 27 (*)     Bacteria, UA Rare      Squamous Epithelial Cells, UA <1      Microscopic Comment       MAGNESIUM - Normal    Magnesium  2.1     CBC WITH DIFFERENTIAL - Normal    WBC 6.84      RBC 4.65      HGB 13.7      HCT 41.7      MCV 90      MCH 29.5      MCHC 32.9      RDW 14.4      Platelet Count 284      MPV 9.9      Nucleated RBC 0      Neut % 57.5      Lymph % 33.6      Mono % 5.3      Eos % 2.8      Basophil % 0.7      Imm Grans % 0.1      Neut # 3.93      Lymph # 2.30      Mono # 0.36      Eos # 0.19      Baso # 0.05      Imm Grans # 0.01     CULTURE, URINE   CBC W/ AUTO DIFFERENTIAL    Narrative:     The following orders were created for panel order CBC auto differential.  Procedure                               Abnormality         Status                     ---------                               -----------         ------                     CBC with Differential[[id number]]       Normal              Final result                 Please view results for these tests on the individual orders.   GREY TOP URINE HOLD    Extra Tube Hold for add-ons.     POCT GLUCOSE    POCT Glucose 97     POCT GLUCOSE MONITORING CONTINUOUS          Imaging Results              CTA Head and Neck (xpd) (Edited Result - FINAL)  Result time 05/16/25 14:24:27      Addendum  (preliminary) 1 of 1 by Leno Reyna MD (05/16/25 14:24:27)      On further review there are nonspecific thyroid nodules on the right measuring up to 11 mm not warranting further radiographic evaluation at this time.      Electronically signed by: Leno Reyna  Date:    05/16/2025  Time:    14:24                 Final result by Leno Reyna MD (05/16/25 14:02:15)                   Impression:      No acute intracranial process.    No significant stenosis at the carotid bifurcations by NASCET criteria or involving the vertebral arteries.    No major branch advanced stenosis/occlusion at the [id number]-Skadfi.      Electronically signed by: Leno Reyna  Date:    05/16/2025  Time:    14:02               Narrative:    EXAMINATION:  CTA HEAD AND NECK (XPD)    CLINICAL HISTORY:  Dizziness, persistent/recurrent, cardiac or vascular cause suspected; headache.  Numbness.    TECHNIQUE:  Non contrast low dose axial images were obtained through the head.  CT angiogram was performed from the level of the estevan to the top of the head following the IV administration of 100mL of Omnipaque 350.   Sagittal and coronal reconstructions and maximum intensity projection reconstructions were performed. Arterial stenosis percentages are based on NASCET measurement criteria.    3D reformats were created on an independent workstation to evaluate the [id number].    COMPARISON:  None    FINDINGS:  Brain:    There is no evidence of hydrocephalus, mass effect, intracranial hemorrhage, or acute territorial infarct.  Presumed prominent perivascular spaces inferior to the left basal ganglia    No enhancing intracranial lesion.    CTA:    No advanced stenosis at the origins of the vessels from the aortic arch.    No advanced stenosis of the vertebral arteries in the neck.    No significant stenosis at the carotid bifurcations by NASCET criteria with mild atherosclerotic calcifications bilaterally..    Intracranially there is  no major branch advanced stenosis/occlusion.    No aneurysm. The venous sinuses are patent.    No soft tissue mass in the neck.  No acute cervical fracture.                                       Medications   proparacaine 0.5 % ophthalmic solution 2 drop (2 drops Left Eye Given by Provider 5/16/25 1258)   fluorescein ophthalmic strip 1 each (1 each Left Eye Given by Provider 5/16/25 1259)   iohexoL (OMNIPAQUE 350) injection 100 mL (100 mLs Intravenous Given 5/16/25 1334)   sodium chloride 0.9% bolus 1,000 mL 1,000 mL (1,000 mLs Intravenous New Bag 5/16/25 1358)   ketorolac injection 15 mg (15 mg Intravenous Given 5/16/25 1425)     Medical Decision Making  Patient presents with multiple complaints including right eye pain and brief blurred vision that has since resolved, generalized headache, and feeling unsteady while ambulating.  Afebrile with vitals WNL.  Patient in no distress on exam    DDx:  Including but not limited to stroke, TIA, headache, migraine, tension, cluster, dehydration, JUAN LUIS, vertigo, orthostatic hypotension, corneal abrasion, corneal ulcer    Amount and/or Complexity of Data Reviewed  Labs: ordered. Decision-making details documented in ED Course.  Radiology: ordered. Decision-making details documented in ED Course.    Risk  Prescription drug management.               ED Course as of 05/16/25 1555   Fri May 16, 2025   1225 ECG:  Sinus bradycardia nonspecific ST-T abnormality in the inferior leads; no STEMI ventricular rate of 59 [LC]   1323 Comprehensive metabolic panel(!)  Grossly unremarkable.  Creatinine 1.1, appearing at baseline.  No significant electrolyte abnormalities [KS]   1323 Magnesium  WNL [KS]   1323 CBC auto differential  WNL [KS]   1336 Orthostatics noting /74 lying and 135/73 standing.  Pulse increasing 55-77.  Will give IV fluids [KS]   1408 CTA Head and Neck (xpd)  CT per Radiology review:    No acute intracranial process.     No significant stenosis at the carotid  bifurcations by NASCET criteria or involving the vertebral arteries.     No major branch advanced stenosis/occlusion at the nhjdkj-ay-Txctwx.   [KS]   1525 C-reactive protein(!)  Minimally elevated 9.4 [KS]   1526 Urinalysis Microscopic(!)  1+ leukocyte of 27 WBC. Urine will be sent to culture. [KS]   1526 Patient was reassessed and resting comfortably on my evaluation.  Per nurse's report, ambulatory with stable gait.  Results were discussed at length.  With careful consideration, I do have lower suspicion for acute event requiring emergent intervention/hospitalization at this time.  Symptoms may be secondary to complex migraine.  Will give short prescription for Fioricet for continued headache management.  I did educate patient that she needs to continue to monitor symptoms very closely with close PCP/Ophthalmology follow-up for reassessment.  ED return precautions were discussed at length.  Patient states understanding and agrees with plan [KS]   1528 Patient discussed with and was seen by attending, Dr. Mcdaniels, who agrees with ED course and dispo. [KS]      ED Course User Index  [KS] Yossi Funk PA-C  [LC] Lenin Mcdaniels MD                           Clinical Impression:  Final diagnoses:  [R42] Lightheaded  [R51.9] Nonintractable headache, unspecified chronicity pattern, unspecified headache type (Primary)  [N30.00] Acute cystitis without hematuria          ED Disposition Condition    Discharge Stable          ED Prescriptions       Medication Sig Dispense Start Date End Date Auth. Provider    butalbital-acetaminophen-caffeine -40 mg (FIORICET, ESGIC) -40 mg per tablet Take 1 tablet by mouth every 6 (six) hours as needed for Pain or Headaches. 6 tablet 5/16/2025 -- Yossi Funk PA-C    cephALEXin (KEFLEX) 500 MG capsule Take 1 capsule (500 mg total) by mouth every 12 (twelve) hours. for 7 days 14 capsule 5/16/2025 5/23/2025 Yossi Funk PA-C          Follow-up Information     None            Yossi Funk PA-C  05/16/25 1532         [1]   Social History  Tobacco Use    Smoking status: Every Day     Current packs/day: 1.00     Average packs/day: 1 pack/day for 45.4 years (45.4 ttl pk-yrs)     Types: Cigarettes     Start date: 1980     Passive exposure: Current    Smokeless tobacco: Never    Tobacco comments:     Smoking cessation education note: Pt is a 1 pk/day cigarette smoker x 45 yrs. 21 mg nicotine patch ordered Q day. Pt states not ready to quit. Handout provided.    Substance Use Topics    Alcohol use: Not Currently    Drug use: Never        Yossi Funk PA-C  05/16/25 3157

## 2025-05-18 ENCOUNTER — RESULTS FOLLOW-UP (OUTPATIENT)
Dept: EMERGENCY MEDICINE | Facility: HOSPITAL | Age: 60
End: 2025-05-18

## 2025-05-19 LAB
BACTERIA UR CULT: ABNORMAL
OHS QRS DURATION: 112 MS
OHS QTC CALCULATION: 435 MS

## 2025-05-21 ENCOUNTER — PATIENT MESSAGE (OUTPATIENT)
Dept: SURGERY | Facility: CLINIC | Age: 60
End: 2025-05-21
Payer: MEDICAID

## 2025-05-22 ENCOUNTER — TELEPHONE (OUTPATIENT)
Dept: SURGERY | Facility: CLINIC | Age: 60
End: 2025-05-22
Payer: MEDICAID

## 2025-05-23 ENCOUNTER — TELEPHONE (OUTPATIENT)
Dept: SURGERY | Facility: CLINIC | Age: 60
End: 2025-05-23
Payer: MEDICAID

## 2025-05-23 NOTE — TELEPHONE ENCOUNTER
Patient requesting call back today with her rib xray results, please call her back at [phone]. She states she just got them done at [facility] today.   Spoke with patient and discussed pre-op surgery instructions. Patient verbalizes understanding.

## 2025-05-23 NOTE — TELEPHONE ENCOUNTER
----- Message from Annemarie sent at 5/23/2025  4:32 PM CDT -----  Regarding: Consult/Advisory/Procedure instructions  Contact: [phone]  Consult/Advisory/Procedure instructionsName Of Caller:Pearl Preference: [phone] of call: Pt would like to speak with a nurse about procedure prep instructionsPlease Call to Advise,Thank you.

## 2025-05-26 ENCOUNTER — OFFICE VISIT (OUTPATIENT)
Dept: FAMILY MEDICINE | Facility: HOSPITAL | Age: 60
End: 2025-05-26
Payer: MEDICAID

## 2025-05-26 VITALS
BODY MASS INDEX: 29.4 KG/M2 | RESPIRATION RATE: 18 BRPM | WEIGHT: 194 LBS | HEIGHT: 68 IN | OXYGEN SATURATION: 97 % | SYSTOLIC BLOOD PRESSURE: 143 MMHG | DIASTOLIC BLOOD PRESSURE: 83 MMHG | HEART RATE: 89 BPM

## 2025-05-26 DIAGNOSIS — G89.29 CHRONIC INTRACTABLE HEADACHE, UNSPECIFIED HEADACHE TYPE: Primary | ICD-10-CM

## 2025-05-26 DIAGNOSIS — R51.9 CHRONIC INTRACTABLE HEADACHE, UNSPECIFIED HEADACHE TYPE: Primary | ICD-10-CM

## 2025-05-26 PROCEDURE — 99214 OFFICE O/P EST MOD 30 MIN: CPT

## 2025-05-26 RX ORDER — SUMATRIPTAN SUCCINATE 50 MG/1
50 TABLET ORAL 2 TIMES DAILY PRN
Qty: 30 TABLET | Refills: 0 | Status: SHIPPED | OUTPATIENT
Start: 2025-05-26

## 2025-05-26 RX ORDER — AMITRIPTYLINE HYDROCHLORIDE 25 MG/1
25 TABLET, FILM COATED ORAL NIGHTLY
Qty: 30 TABLET | Refills: 5 | Status: SHIPPED | OUTPATIENT
Start: 2025-05-26 | End: 2025-11-22

## 2025-05-26 NOTE — PROGRESS NOTES
"  [facility] FAM[facility] PRACT[facility] CLINIC NOTE  Follow-up Visit      SUBJECTIVE:     Patient: Josselyn Tarango is a 59 y.o. female.    Chief Compliant:   Chief Complaint   Patient presents with    Follow-up       History of Present Illness:  59 year old female PMHx of prediabetes, prior nephrolithiasis, presents today for ER visit follow up. Recent visit with ER for generalized headache with dizziness. CTA head and neck at the time with no acute intracranial process or stenosis of carotid or [place]. She states she had a prior history of migraines which did not respond to "normal migraine" medications, although, had resolved after taking BC powder daily. She states if she does not have BC powder around, a concoction of cola, aspirin, and tylenol resolves the headache. She states she typically gets the headaches every morning. She denies weakness, nausea, vomiting. Does admit to some blurred vision initially, although lately, has not been having blurred vision.     ROS  A 10+ review of systems was performed with pertinent positives and negatives noted above in the history of present illness. Other systems were negative unless otherwise specified.    OBJECTIVE:     Vital Signs (Most Recent)  Vitals:    05/26/25 1516   BP: (!) 143/83   BP Location: Left arm   Patient Position: Sitting   Pulse: 89   Resp: 18   SpO2: 97%   Weight: 88 kg (194 lb 0.1 oz)   Height: 5' 8" (1.727 m)     BMI: Body mass index is 29.5 kg/m².     Physical Exam:  Physical Exam  Constitutional:       Appearance: Normal appearance. She is normal weight.   HENT:      Head: Normocephalic and atraumatic.      Comments: Bitemporal tenderness     Mouth/Throat:      Mouth: Mucous membranes are moist.      Pharynx: Oropharynx is clear.   Eyes:      Extraocular Movements: Extraocular movements intact.      Conjunctiva/sclera: Conjunctivae normal.      Pupils: Pupils are equal, round, and reactive to light.   Cardiovascular:      Rate and Rhythm: Normal rate and " regular rhythm.      Pulses: Normal pulses.      Heart sounds: Normal heart sounds.   Pulmonary:      Effort: Pulmonary effort is normal.      Breath sounds: Normal breath sounds.   Abdominal:      General: Abdomen is flat.      Palpations: Abdomen is soft.   Musculoskeletal:         General: Normal range of motion.      Cervical back: Normal range of motion and neck supple.   Skin:     General: Skin is warm and dry.      Capillary Refill: Capillary refill takes less than 2 seconds.   Neurological:      General: No focal deficit present.      Mental Status: She is alert and oriented to person, place, and time.        ASSESSMENT:   Josselyn Tarango is a 59 y.o. female who presents to clinic for    1. Chronic intractable headache, unspecified headache type       PLAN:     Chronic intractable headache, unspecified headache type  -     sumatriptan (IMITREX) 50 MG tablet; Take 1 tablet (50 mg total) by mouth 2 (two) times daily as needed for Migraine.  Dispense: 30 tablet; Refill: 0  -     amitriptyline (ELAVIL) 25 MG tablet; Take 1 tablet (25 mg total) by mouth every evening.  Dispense: 30 tablet; Refill: 5  - Chronic, uncontrolled.  - Likely multifactorial in etiology given daily BC powder usage with caffeine suspect caffeine withdrawal headache vs poor sleep  - Urged patient to trial cessation of caffeine in addition to potential.  - Sleep study referral placed    Provided patient with anticipatory guidance and return precautions. Treatment plan discussed with patient, all questions answered, and patient acknowledged understanding and verbal agreement.    Follow-up in: 1 months; or sooner PRN if acute concerns arise.    Case discussed with Dr. NEFTALY Murray Do, MD  [facility] PGY-2

## 2025-05-27 ENCOUNTER — ANESTHESIA EVENT (OUTPATIENT)
Dept: SURGERY | Facility: HOSPITAL | Age: 60
End: 2025-05-27
Payer: MEDICAID

## 2025-05-27 NOTE — ANESTHESIA PREPROCEDURE EVALUATION
[facility]  Anesthesia Pre-Operative Evaluation         Patient Name/: Josselyn Tarango, 1965  MRN: [id number]    SUBJECTIVE:     Pre-operative evaluation for Procedure(s) (LRB):  REPAIR,WOUND,USING FLAP,AFTER PILONIDAL CYST PROCEDURE (N/A)  EXCISION, PILONIDAL CYST (N/A)     2025    Josselyn Tarango is a 59 y.o. female     Patient now presents for the above procedure(s).    ________________________________________  No results found for this or any previous visit.    ________________________________________    LDA:        Drips:       Problem List[1]    Review of patient's allergies indicates:   Allergen Reactions    Sulfa (sulfonamide antibiotics)        Current Inpatient Medications:       Medications Ordered Prior to Encounter[2]    Past Surgical History:   Procedure Laterality Date    COLONOSCOPY, SCREENING, HIGH RISK PATIENT N/A 2025    Procedure: COLONOSCOPY, SCREENING, HIGH RISK PATIENT;  Surgeon: Pino Cuellar MD;  Location: [address];  Service: Endoscopy;  Laterality: N/A;  ref by Ryder Nichols MD, peg, portal-ae  Jm/change MD from Wallace to /adjust to fellow  case lengths   pre call complete/mleone    CYSTOSCOPY W/ URETERAL STENT PLACEMENT Left 2024    Procedure: CYSTOSCOPY, left retrograde pyelogram, left JJ stent;  Surgeon: Humza Saenz MD;  Location: [facility] OR;  Service: Urology;  Laterality: Left;  MAC vs choice    CYSTOURETEROSCOPY, WITH HOLMIUM LASER LITHOTRIPSY OF URETERAL CALCULUS AND STENT INSERTION Left 2024    Procedure: Cystoscopy, left retrograde pyelogram, left ureteroscopy with holmium laser lithotripsy, stone basket extraction, left JJ stent;  Surgeon: Humza Saenz MD;  Location: [facility] OR;  Service: Urology;  Laterality: Left;  Element    CYSTOURETEROSCOPY, WITH HOLMIUM LASER LITHOTRIPSY OF URETERAL CALCULUS AND STENT INSERTION Right 10/14/2024    Procedure: CYSTOURETEROSCOPY, WITH HOLMIUM LASER  LITHOTRIPSY OF URETERAL CALCULUS AND STENT INSERTION;  Surgeon: Humza Saenz MD;  Location: [facility] OR;  Service: Urology;  Laterality: Right;  LMA    EXTRACTION - STONE Left 6/17/2024    Procedure: EXTRACTION - STONE;  Surgeon: Humza Saenz MD;  Location: [facility] OR;  Service: Urology;  Laterality: Left;    RETROGRADE PYELOGRAPHY N/A 6/7/2024    Procedure: PYELOGRAM, RETROGRADE;  Surgeon: Humza Saenz MD;  Location: [facility] OR;  Service: Urology;  Laterality: N/A;    RETROGRADE PYELOGRAPHY N/A 6/17/2024    Procedure: PYELOGRAM, RETROGRADE;  Surgeon: Humza Saenz MD;  Location: [facility] OR;  Service: Urology;  Laterality: N/A;    RETROGRADE PYELOGRAPHY  10/14/2024    Procedure: PYELOGRAM, RETROGRADE;  Surgeon: Humza Saenz MD;  Location: [facility] OR;  Service: Urology;;       Social History:  Tobacco Use: High Risk (5/26/2025)    Patient History     Smoking Tobacco Use: Every Day     Smokeless Tobacco Use: Never     Passive Exposure: Current       Alcohol Use: Not At Risk (3/3/2025)    AUDIT-C     Frequency of Alcohol Consumption: Monthly or less     Average Number of Drinks: 1 or 2     Frequency of Binge Drinking: Never       OBJECTIVE:     Vital Signs Range:  BMI Readings from Last 1 Encounters:   05/26/25 29.50 kg/m²               Significant Labs:        Component Value Date/Time    WBC 6.84 05/16/2025 1216    HGB 13.7 05/16/2025 1216    HGB 13.8 03/06/2025 1241    HCT 41.7 05/16/2025 1216    HCT 42.4 03/06/2025 1241     05/16/2025 1216     03/06/2025 1241     05/16/2025 1216     03/06/2025 1241    K 4.1 05/16/2025 1216    K 4.0 03/06/2025 1241     05/16/2025 1216     03/06/2025 1241    CO2 24 05/16/2025 1216    CO2 21 (L) 03/06/2025 1241    GLU 83 05/16/2025 1216    GLU 85 03/06/2025 1241    BUN 14 05/16/2025 1216    CREATININE 1.1 05/16/2025 1216    MG 2.1 05/16/2025 1216    PHOS 2.3 (L) 10/18/2024 0410    CALCIUM 9.4 05/16/2025 1216    CALCIUM 9.7  03/06/2025 1241    ALBUMIN 3.8 05/16/2025 1216    ALBUMIN 3.5 03/06/2025 1241    PROT 7.7 05/16/2025 1216    PROT 8.1 03/06/2025 1241    ALKPHOS 103 05/16/2025 1216    ALKPHOS 128 03/06/2025 1241    BILITOT 0.3 05/16/2025 1216    BILITOT 0.3 03/06/2025 1241    AST 11 05/16/2025 1216    AST 11 03/06/2025 1241    ALT 9 (L) 05/16/2025 1216    ALT 10 03/06/2025 1241    INR 1.0 04/09/2025 0939    HGBA1C 5.2 03/06/2025 1241        Please see Results Review for additional labs.     Diagnostic Studies: No relevant studies.    EKG:   Results for orders placed or performed during the hospital encounter of 05/16/25   EKG 12-lead    Collection Time: 05/16/25 12:23 PM   Result Value Ref Range    QRS Duration 112 ms    [facility] QTC Calculation 435 ms    Narrative    Test Reason : R42,    Vent. Rate :  59 BPM     Atrial Rate :  59 BPM     P-R Int : 160 ms          QRS Dur : 112 ms      QT Int : 440 ms       P-R-T Axes :  55  37  21 degrees    QTcB Int : 435 ms    Sinus bradycardia  Nonspecific ST and T wave abnormality  Abnormal ECG  No previous ECGs available  Confirmed by Dedrick Loaiza ([id number]) on 5/19/2025 9:51:15 PM    Referred By: AAAREFERRAL SELF           Confirmed By: Dedrick Ramirez       ECHO:  See subjective, if available.      ASSESSMENT/PLAN:                                                                                                                  05/27/2025  Josseyln Tarango is a 59 y.o., female.      Pre-op Assessment          Review of Systems  Renal/:  Chronic Renal Disease, renal hypertension                Endocrine:  Diabetes           Psych:  Psychiatric History                  Physical Exam  General: Well nourished    Airway:  Mallampati: II   Mouth Opening: Normal  Neck ROM: Normal ROM    Dental:  Intact        Anesthesia Plan  Type of Anesthesia, risks & benefits discussed:    Anesthesia Type: Gen ETT  Intra-op Monitoring Plan: Standard ASA Monitors  Induction:  IV  Informed Consent: Informed  consent signed with the Patient and all parties understand the risks and agree with anesthesia plan.  All questions answered.   ASA Score: 2  Day of Surgery Review of History & Physical: H&P Update referred to the surgeon/provider.    Ready For Surgery From Anesthesia Perspective.     .           [1]   Patient Active Problem List  Diagnosis    Hydronephrosis    Type 2 diabetes mellitus, without long-term current use of insulin    Nephrolithiasis    Adrenal adenoma    Left ureteral stone    SIRS (systemic inflammatory response syndrome)    Dependence on nicotine from cigarettes    Sepsis    Dysphagia    Hypokalemia    Bacteremia    JUAN LUIS (acute kidney injury)    Abnormal urine odor    Suprapubic pain    Dysuria    Urinary frequency    Pilonidal cyst    Cystocele with prolapse   [2]   No current facility-administered medications on file prior to encounter.     Current Outpatient Medications on File Prior to Encounter   Medication Sig Dispense Refill    aspirin 325 MG tablet Take 650 mg by mouth.      atorvastatin (LIPITOR) 40 MG tablet Take 0.5 tablets (20 mg total) by mouth once daily. 45 tablet 3    estradioL (ESTRACE) 0.01 % (0.1 mg/gram) vaginal cream Place 1 g vaginally once daily. 42.5 g 3    fluticasone propionate (FLONASE) 50 mcg/actuation nasal spray 1 spray (50 mcg total) by Each Nostril route once daily. 15.8 mL 1    nystatin (MYCOSTATIN) powder Apply topically 2 (two) times daily. 15 g 0    incontinence pad, liner, disp Pads 1 Bag by Misc.(Non-Drug; Combo Route) route every 14 (fourteen) days. 40 each 11    mirabegron (MYRBETRIQ) 50 mg Tb24 Take 1 tablet (50 mg total) by mouth once daily. 30 tablet 11    prednisoLONE acetate (PRED FORTE) 1 % DrpS SMARTSI Drop(s) In Eye(s) 6 Times Daily

## 2025-05-28 ENCOUNTER — HOSPITAL ENCOUNTER (OUTPATIENT)
Facility: HOSPITAL | Age: 60
Discharge: HOME | End: 2025-05-28
Attending: COLON & RECTAL SURGERY | Admitting: COLON & RECTAL SURGERY
Payer: MEDICAID

## 2025-05-28 ENCOUNTER — ANESTHESIA (OUTPATIENT)
Dept: SURGERY | Facility: HOSPITAL | Age: 60
End: 2025-05-28
Payer: MEDICAID

## 2025-05-28 DIAGNOSIS — L05.91 PILONIDAL CYST: Primary | ICD-10-CM

## 2025-05-28 DIAGNOSIS — L98.8 PILONIDAL DISEASE: ICD-10-CM

## 2025-05-28 PROCEDURE — 25000003 PHARM REV CODE 250: Performed by: COLON & RECTAL SURGERY

## 2025-05-28 PROCEDURE — 11772 EXC PILONIDAL CYST COMP: CPT | Mod: 51,,, | Performed by: COLON & RECTAL SURGERY

## 2025-05-28 PROCEDURE — 14301 TIS TRNFR ANY 30.1-60 SQ CM: CPT | Mod: ,,, | Performed by: COLON & RECTAL SURGERY

## 2025-05-28 PROCEDURE — 25000003 PHARM REV CODE 250: Performed by: STUDENT IN AN ORGANIZED HEALTH CARE EDUCATION/TRAINING PROGRAM

## 2025-05-28 PROCEDURE — 25000003 PHARM REV CODE 250: Performed by: NURSE PRACTITIONER

## 2025-05-28 PROCEDURE — 63600175 PHARM REV CODE 636 W HCPCS: Performed by: STUDENT IN AN ORGANIZED HEALTH CARE EDUCATION/TRAINING PROGRAM

## 2025-05-28 PROCEDURE — 88304 TISSUE EXAM BY PATHOLOGIST: CPT | Mod: TC | Performed by: COLON & RECTAL SURGERY

## 2025-05-28 PROCEDURE — 36000706: Performed by: COLON & RECTAL SURGERY

## 2025-05-28 PROCEDURE — 37000008 HC ANESTHESIA 1ST 15 MINUTES: Performed by: COLON & RECTAL SURGERY

## 2025-05-28 PROCEDURE — 36000707: Performed by: COLON & RECTAL SURGERY

## 2025-05-28 PROCEDURE — 63600175 PHARM REV CODE 636 W HCPCS: Performed by: COLON & RECTAL SURGERY

## 2025-05-28 PROCEDURE — 63600175 PHARM REV CODE 636 W HCPCS: Performed by: ANESTHESIOLOGY

## 2025-05-28 PROCEDURE — 37000009 HC ANESTHESIA EA ADD 15 MINS: Performed by: COLON & RECTAL SURGERY

## 2025-05-28 PROCEDURE — 71000015 HC POSTOP RECOV 1ST HR: Performed by: COLON & RECTAL SURGERY

## 2025-05-28 PROCEDURE — 71000044 HC DOSC ROUTINE RECOVERY FIRST HOUR: Performed by: COLON & RECTAL SURGERY

## 2025-05-28 RX ORDER — GLUCAGON 1 MG
1 KIT INJECTION
Status: DISCONTINUED | OUTPATIENT
Start: 2025-05-28 | End: 2025-05-28 | Stop reason: HOSPADM

## 2025-05-28 RX ORDER — MIDAZOLAM HYDROCHLORIDE 1 MG/ML
INJECTION INTRAMUSCULAR; INTRAVENOUS
Status: DISCONTINUED | OUTPATIENT
Start: 2025-05-28 | End: 2025-05-28

## 2025-05-28 RX ORDER — SODIUM CHLORIDE 9 MG/ML
INJECTION, SOLUTION INTRAVENOUS CONTINUOUS
Status: DISCONTINUED | OUTPATIENT
Start: 2025-05-28 | End: 2025-05-28 | Stop reason: HOSPADM

## 2025-05-28 RX ORDER — HYDROMORPHONE HYDROCHLORIDE 1 MG/ML
0.2 INJECTION, SOLUTION INTRAMUSCULAR; INTRAVENOUS; SUBCUTANEOUS EVERY 5 MIN PRN
Status: DISCONTINUED | OUTPATIENT
Start: 2025-05-28 | End: 2025-05-28 | Stop reason: HOSPADM

## 2025-05-28 RX ORDER — CEFAZOLIN SODIUM 1 G/3ML
INJECTION, POWDER, FOR SOLUTION INTRAMUSCULAR; INTRAVENOUS
Status: DISCONTINUED | OUTPATIENT
Start: 2025-05-28 | End: 2025-05-28

## 2025-05-28 RX ORDER — OXYCODONE HYDROCHLORIDE 5 MG/1
5 TABLET ORAL ONCE AS NEEDED
Refills: 0 | Status: COMPLETED | OUTPATIENT
Start: 2025-05-28 | End: 2025-05-28

## 2025-05-28 RX ORDER — ACETAMINOPHEN 500 MG
500 TABLET ORAL EVERY 6 HOURS PRN
Qty: 120 TABLET | Refills: 0 | Status: SHIPPED | OUTPATIENT
Start: 2025-05-28 | End: 2025-06-27

## 2025-05-28 RX ORDER — ONDANSETRON HYDROCHLORIDE 2 MG/ML
INJECTION, SOLUTION INTRAVENOUS
Status: DISCONTINUED | OUTPATIENT
Start: 2025-05-28 | End: 2025-05-28

## 2025-05-28 RX ORDER — VASOPRESSIN 20 [USP'U]/ML
INJECTION, SOLUTION INTRAMUSCULAR; SUBCUTANEOUS
Status: DISCONTINUED | OUTPATIENT
Start: 2025-05-28 | End: 2025-05-28

## 2025-05-28 RX ORDER — AMOXICILLIN AND CLAVULANATE POTASSIUM 875; 125 MG/1; MG/1
1 TABLET, FILM COATED ORAL EVERY 12 HOURS
Qty: 14 TABLET | Refills: 0 | Status: SHIPPED | OUTPATIENT
Start: 2025-05-28 | End: 2025-06-04

## 2025-05-28 RX ORDER — FENTANYL CITRATE 50 UG/ML
INJECTION, SOLUTION INTRAMUSCULAR; INTRAVENOUS
Status: DISCONTINUED | OUTPATIENT
Start: 2025-05-28 | End: 2025-05-28

## 2025-05-28 RX ORDER — LIDOCAINE HYDROCHLORIDE 20 MG/ML
INJECTION INTRAVENOUS
Status: DISCONTINUED | OUTPATIENT
Start: 2025-05-28 | End: 2025-05-28

## 2025-05-28 RX ORDER — PROPOFOL 10 MG/ML
VIAL (ML) INTRAVENOUS
Status: DISCONTINUED | OUTPATIENT
Start: 2025-05-28 | End: 2025-05-28

## 2025-05-28 RX ORDER — OXYCODONE HYDROCHLORIDE 5 MG/1
5 TABLET ORAL EVERY 6 HOURS PRN
Qty: 20 TABLET | Refills: 0 | Status: SHIPPED | OUTPATIENT
Start: 2025-05-28

## 2025-05-28 RX ORDER — ROCURONIUM BROMIDE 10 MG/ML
INJECTION, SOLUTION INTRAVENOUS
Status: DISCONTINUED | OUTPATIENT
Start: 2025-05-28 | End: 2025-05-28

## 2025-05-28 RX ORDER — BUPIVACAINE HYDROCHLORIDE 2.5 MG/ML
INJECTION, SOLUTION EPIDURAL; INFILTRATION; INTRACAUDAL; PERINEURAL
Status: DISCONTINUED | OUTPATIENT
Start: 2025-05-28 | End: 2025-05-28 | Stop reason: HOSPADM

## 2025-05-28 RX ORDER — OXYCODONE HYDROCHLORIDE 5 MG/1
TABLET ORAL
Status: DISCONTINUED
Start: 2025-05-28 | End: 2025-05-28 | Stop reason: HOSPADM

## 2025-05-28 RX ORDER — METRONIDAZOLE 500 MG/100ML
INJECTION, SOLUTION INTRAVENOUS
Status: DISCONTINUED | OUTPATIENT
Start: 2025-05-28 | End: 2025-05-28

## 2025-05-28 RX ORDER — MUPIROCIN 20 MG/G
OINTMENT TOPICAL
Status: DISCONTINUED | OUTPATIENT
Start: 2025-05-28 | End: 2025-05-28 | Stop reason: HOSPADM

## 2025-05-28 RX ORDER — HALOPERIDOL LACTATE 5 MG/ML
0.5 INJECTION, SOLUTION INTRAMUSCULAR EVERY 10 MIN PRN
Status: DISCONTINUED | OUTPATIENT
Start: 2025-05-28 | End: 2025-05-28 | Stop reason: HOSPADM

## 2025-05-28 RX ORDER — DEXAMETHASONE SODIUM PHOSPHATE 4 MG/ML
INJECTION, SOLUTION INTRA-ARTICULAR; INTRALESIONAL; INTRAMUSCULAR; INTRAVENOUS; SOFT TISSUE
Status: DISCONTINUED | OUTPATIENT
Start: 2025-05-28 | End: 2025-05-28

## 2025-05-28 RX ORDER — PHENYLEPHRINE HYDROCHLORIDE 10 MG/ML
INJECTION INTRAVENOUS
Status: DISCONTINUED | OUTPATIENT
Start: 2025-05-28 | End: 2025-05-28

## 2025-05-28 RX ADMIN — GLYCOPYRROLATE 0.2 MG: 0.2 INJECTION, SOLUTION INTRAMUSCULAR; INTRAVENOUS at 01:05

## 2025-05-28 RX ADMIN — PROPOFOL 150 MG: 10 INJECTION, EMULSION INTRAVENOUS at 01:05

## 2025-05-28 RX ADMIN — FENTANYL CITRATE 50 MCG: 50 INJECTION, SOLUTION INTRAMUSCULAR; INTRAVENOUS at 01:05

## 2025-05-28 RX ADMIN — LIDOCAINE HYDROCHLORIDE 60 MG: 20 INJECTION INTRAVENOUS at 01:05

## 2025-05-28 RX ADMIN — VASOPRESSIN 1 UNITS: 20 INJECTION INTRAVENOUS at 02:05

## 2025-05-28 RX ADMIN — MIDAZOLAM HYDROCHLORIDE 2 MG: 2 INJECTION, SOLUTION INTRAMUSCULAR; INTRAVENOUS at 01:05

## 2025-05-28 RX ADMIN — OXYCODONE 5 MG: 5 TABLET ORAL at 04:05

## 2025-05-28 RX ADMIN — HYDROMORPHONE HYDROCHLORIDE 0.2 MG: 1 INJECTION, SOLUTION INTRAMUSCULAR; INTRAVENOUS; SUBCUTANEOUS at 04:05

## 2025-05-28 RX ADMIN — SUGAMMADEX 200 MG: 100 INJECTION, SOLUTION INTRAVENOUS at 02:05

## 2025-05-28 RX ADMIN — CEFAZOLIN 2 G: 330 INJECTION, POWDER, FOR SOLUTION INTRAMUSCULAR; INTRAVENOUS at 02:05

## 2025-05-28 RX ADMIN — HYDROMORPHONE HYDROCHLORIDE 0.2 MG: 1 INJECTION, SOLUTION INTRAMUSCULAR; INTRAVENOUS; SUBCUTANEOUS at 03:05

## 2025-05-28 RX ADMIN — PHENYLEPHRINE HYDROCHLORIDE 200 MCG: 10 INJECTION INTRAVENOUS at 02:05

## 2025-05-28 RX ADMIN — FENTANYL CITRATE 100 MCG: 50 INJECTION, SOLUTION INTRAMUSCULAR; INTRAVENOUS at 02:05

## 2025-05-28 RX ADMIN — ONDANSETRON 4 MG: 2 INJECTION INTRAMUSCULAR; INTRAVENOUS at 02:05

## 2025-05-28 RX ADMIN — FENTANYL CITRATE 50 MCG: 50 INJECTION, SOLUTION INTRAMUSCULAR; INTRAVENOUS at 02:05

## 2025-05-28 RX ADMIN — SODIUM CHLORIDE: 0.9 INJECTION, SOLUTION INTRAVENOUS at 01:05

## 2025-05-28 RX ADMIN — DEXAMETHASONE SODIUM PHOSPHATE 8 MG: 4 INJECTION, SOLUTION INTRAMUSCULAR; INTRAVENOUS at 02:05

## 2025-05-28 RX ADMIN — ROCURONIUM BROMIDE 50 MG: 10 INJECTION, SOLUTION INTRAVENOUS at 01:05

## 2025-05-28 RX ADMIN — METRONIDAZOLE 500 MG: 500 INJECTION, SOLUTION INTRAVENOUS at 02:05

## 2025-05-28 NOTE — TRANSFER OF CARE
"Anesthesia Transfer of Care Note    Patient: Josselyn Tarango    Procedure(s) Performed: Procedure(s) (LRB):  REPAIR,WOUND,USING FLAP,AFTER PILONIDAL CYST PROCEDURE (N/A)  EXCISION, PILONIDAL CYST (N/A)    Patient location: [facility]    Anesthesia Type: general    Transport from OR: Transported from OR on 6-10 L/min O2 by face mask with adequate spontaneous ventilation    Post pain: adequate analgesia    Post assessment: no apparent anesthetic complications    Post vital signs: stable    Level of consciousness: lethargic    Nausea/Vomiting: no nausea/vomiting    Complications: none    Transfer of care protocol was followed      Last vitals: Visit Vitals  /63   Pulse 70   Temp 36.5 °C (97.7 °F) (Temporal)   Resp 20   Ht 5' 8" (1.727 m)   Wt 89.1 kg (196 lb 6.9 oz)   SpO2 97%   Breastfeeding No   BMI 29.87 kg/m²     "

## 2025-05-28 NOTE — ANESTHESIA PROCEDURE NOTES
Intubation    Date/Time: 5/28/2025 1:50 PM    Performed by: Leno Shen CRNA  Authorized by: Pj Orellana MD    Intubation:     Induction:  Intravenous    Intubated:  Postinduction    Mask Ventilation:  Easy with oral airway    Attempts:  1    Attempted By:  Student    Method of Intubation:  Direct    Blade:  Ciro 4    Laryngeal View Grade: Grade I - full view of cords      Difficult Airway Encountered?: No      Complications:  None    Airway Device:  Oral endotracheal tube    Airway Device Size:  7.5    Style/Cuff Inflation:  Cuffed    Tube secured:  22    Placement Verified By:  Capnometry    Complicating Factors:  None    Findings Post-Intubation:  BS equal bilateral

## 2025-05-29 VITALS
OXYGEN SATURATION: 96 % | BODY MASS INDEX: 29.77 KG/M2 | SYSTOLIC BLOOD PRESSURE: 117 MMHG | HEIGHT: 68 IN | TEMPERATURE: 98 F | WEIGHT: 196.44 LBS | RESPIRATION RATE: 20 BRPM | HEART RATE: 61 BPM | DIASTOLIC BLOOD PRESSURE: 65 MMHG

## 2025-05-29 NOTE — ANESTHESIA POSTPROCEDURE EVALUATION
Anesthesia Post Evaluation    Patient: Josselyn Tarango    Procedure(s) Performed: Procedure(s) (LRB):  REPAIR,WOUND,USING FLAP,AFTER PILONIDAL CYST PROCEDURE (N/A)  EXCISION, PILONIDAL CYST (N/A)    Final Anesthesia Type: general      Patient location during evaluation: PACU  Patient participation: Yes- Able to Participate  Level of consciousness: awake and alert  Post-procedure vital signs: reviewed and stable  Pain management: adequate  Airway patency: patent    PONV status at discharge: No PONV  Anesthetic complications: no      Cardiovascular status: blood pressure returned to baseline  Respiratory status: unassisted  Hydration status: euvolemic  Follow-up not needed.              Vitals Value Taken Time   /65 05/28/25 16:30   Temp 36.7 °C (98 °F) 05/28/25 16:30   Pulse 61 05/28/25 16:30   Resp 20 05/28/25 16:30   SpO2 96 % 05/28/25 16:30         No case tracking events are documented in the log.      Pain/Barbra Score: Pain Rating Prior to Med Admin: 6 (5/28/2025  4:00 PM)  Pain Rating Post Med Admin: 4 (5/28/2025  4:30 PM)  Barbra Score: 10 (5/28/2025  3:30 PM)

## 2025-06-02 LAB
ESTROGEN SERPL-MCNC: NORMAL PG/ML
INSULIN SERPL-ACNC: NORMAL U[IU]/ML
LAB AP CLINICAL INFORMATION: NORMAL
LAB AP GROSS DESCRIPTION: NORMAL
LAB AP PERFORMING LOCATION(S): NORMAL
LAB AP REPORT FOOTNOTES: NORMAL

## 2025-06-07 ENCOUNTER — HOSPITAL ENCOUNTER (EMERGENCY)
Facility: HOSPITAL | Age: 60
Discharge: HOME | End: 2025-06-08
Attending: EMERGENCY MEDICINE
Payer: MEDICAID

## 2025-06-07 DIAGNOSIS — N39.0 URINARY TRACT INFECTION WITHOUT HEMATURIA, SITE UNSPECIFIED: ICD-10-CM

## 2025-06-07 DIAGNOSIS — T67.9XXA HEAT EXPOSURE, INITIAL ENCOUNTER: Primary | ICD-10-CM

## 2025-06-07 DIAGNOSIS — R53.83 FATIGUE: ICD-10-CM

## 2025-06-07 LAB
ABSOLUTE EOSINOPHIL (OHS): 0.15 K/UL
ABSOLUTE MONOCYTE (OHS): 0.48 K/UL (ref 0.3–1)
ABSOLUTE NEUTROPHIL COUNT (OHS): 6.17 K/UL (ref 1.8–7.7)
ALBUMIN SERPL BCP-MCNC: 3.2 G/DL (ref 3.5–5.2)
ALP SERPL-CCNC: 86 UNIT/L (ref 40–150)
ALT SERPL W/O P-5'-P-CCNC: 9 UNIT/L (ref 10–44)
ANION GAP (OHS): 12 MMOL/L (ref 8–16)
AST SERPL-CCNC: 14 UNIT/L (ref 11–45)
BACTERIA #/AREA URNS AUTO: ABNORMAL /HPF
BASOPHILS # BLD AUTO: 0.04 K/UL
BASOPHILS NFR BLD AUTO: 0.4 %
BILIRUB SERPL-MCNC: 0.3 MG/DL (ref 0.1–1)
BILIRUB UR QL STRIP.AUTO: NEGATIVE
BUN SERPL-MCNC: 11 MG/DL (ref 6–20)
CALCIUM SERPL-MCNC: 9.1 MG/DL (ref 8.7–10.5)
CHLORIDE SERPL-SCNC: 114 MMOL/L (ref 95–110)
CK SERPL-CCNC: 39 U/L (ref 20–180)
CLARITY UR: ABNORMAL
CO2 SERPL-SCNC: 17 MMOL/L (ref 23–29)
COLOR UR AUTO: COLORLESS
CREAT SERPL-MCNC: 1.1 MG/DL (ref 0.5–1.4)
ERYTHROCYTE [DISTWIDTH] IN BLOOD BY AUTOMATED COUNT: 13.9 % (ref 11.5–14.5)
GFR SERPLBLD CREATININE-BSD FMLA CKD-EPI: 58 ML/MIN/1.73/M2
GLUCOSE SERPL-MCNC: 81 MG/DL (ref 70–110)
GLUCOSE UR QL STRIP: NEGATIVE
HCT VFR BLD AUTO: 38.7 % (ref 37–48.5)
HGB BLD-MCNC: 12.9 GM/DL (ref 12–16)
HGB UR QL STRIP: NEGATIVE
IMM GRANULOCYTES # BLD AUTO: 0.03 K/UL (ref 0–0.04)
IMM GRANULOCYTES NFR BLD AUTO: 0.3 % (ref 0–0.5)
KETONES UR QL STRIP: NEGATIVE
LEUKOCYTE ESTERASE UR QL STRIP: ABNORMAL
LYMPHOCYTES # BLD AUTO: 2.51 K/UL (ref 1–4.8)
MCH RBC QN AUTO: 29.4 PG (ref 27–31)
MCHC RBC AUTO-ENTMCNC: 33.3 G/DL (ref 32–36)
MCV RBC AUTO: 88 FL (ref 82–98)
MICROSCOPIC COMMENT: ABNORMAL
NITRITE UR QL STRIP: NEGATIVE
NUCLEATED RBC (/100WBC) (OHS): 0 /100 WBC
PH UR STRIP: 6 [PH]
PLATELET # BLD AUTO: 209 K/UL (ref 150–450)
PMV BLD AUTO: 10.7 FL (ref 9.2–12.9)
POTASSIUM SERPL-SCNC: 3.5 MMOL/L (ref 3.5–5.1)
PROT SERPL-MCNC: 6.7 GM/DL (ref 6–8.4)
PROT UR QL STRIP: NEGATIVE
RBC # BLD AUTO: 4.39 M/UL (ref 4–5.4)
RBC #/AREA URNS AUTO: 1 /HPF (ref 0–4)
RELATIVE EOSINOPHIL (OHS): 1.6 %
RELATIVE LYMPHOCYTE (OHS): 26.8 % (ref 18–48)
RELATIVE MONOCYTE (OHS): 5.1 % (ref 4–15)
RELATIVE NEUTROPHIL (OHS): 65.8 % (ref 38–73)
SODIUM SERPL-SCNC: 143 MMOL/L (ref 136–145)
SP GR UR STRIP: 1
SQUAMOUS #/AREA URNS AUTO: 2 /HPF
TROPONIN I SERPL DL<=0.01 NG/ML-MCNC: 0.01 NG/ML
UROBILINOGEN UR STRIP-ACNC: NEGATIVE EU/DL
WBC # BLD AUTO: 9.38 K/UL (ref 3.9–12.7)
WBC #/AREA URNS AUTO: 84 /HPF (ref 0–5)
WBC CLUMPS UR QL AUTO: ABNORMAL

## 2025-06-07 PROCEDURE — 85025 COMPLETE CBC W/AUTO DIFF WBC: CPT

## 2025-06-07 PROCEDURE — 84484 ASSAY OF TROPONIN QUANT: CPT

## 2025-06-07 PROCEDURE — 25000003 PHARM REV CODE 250

## 2025-06-07 PROCEDURE — 82550 ASSAY OF CK (CPK): CPT

## 2025-06-07 PROCEDURE — 99284 EMERGENCY DEPT VISIT MOD MDM: CPT | Mod: 25

## 2025-06-07 PROCEDURE — 80053 COMPREHEN METABOLIC PANEL: CPT

## 2025-06-07 PROCEDURE — 93010 ELECTROCARDIOGRAM REPORT: CPT | Mod: ,,, | Performed by: INTERNAL MEDICINE

## 2025-06-07 PROCEDURE — 96360 HYDRATION IV INFUSION INIT: CPT

## 2025-06-07 PROCEDURE — 93005 ELECTROCARDIOGRAM TRACING: CPT

## 2025-06-07 PROCEDURE — 81001 URINALYSIS AUTO W/SCOPE: CPT

## 2025-06-07 RX ORDER — CEPHALEXIN 500 MG/1
500 CAPSULE ORAL 2 TIMES DAILY
Qty: 10 CAPSULE | Refills: 0 | Status: SHIPPED | OUTPATIENT
Start: 2025-06-07 | End: 2025-06-12

## 2025-06-07 RX ORDER — ACETAMINOPHEN 325 MG/1
650 TABLET ORAL
Status: COMPLETED | OUTPATIENT
Start: 2025-06-07 | End: 2025-06-07

## 2025-06-07 RX ADMIN — ACETAMINOPHEN 650 MG: 325 TABLET ORAL at 03:06

## 2025-06-07 RX ADMIN — SODIUM CHLORIDE 1000 ML: 9 INJECTION, SOLUTION INTRAVENOUS at 03:06

## 2025-06-07 NOTE — ED PROVIDER NOTES
Encounter Date: 6/7/2025       History     Chief Complaint   Patient presents with    Fatigue     Walked from her house to the store and now feeling weak. No fall or LOC.     59-year-old female with history of diabetes, gastric ulcer, kidney stone, pilonidal cyst who presents today for evaluation of fatigue.  Patient reports walking from her house to the store which is about a mi away.  She reports starting to feel overheated on the walk there, a stranger stopped and gave her a ride the rest of the way.  On her way back home, she began to feel overheated again.  She sat down on the ground and felt like her whole body was numb.  She then called 911.  She did not lose consciousness.  She denies experiencing chest pain, nausea, vomiting, shortness of breath.    Of note, she recently had surgery for pilonidal cyst, has an incision to her lower back and a drain in place.  Denies any issues with this.    The history is provided by the patient and medical records. No  was used.     Review of patient's allergies indicates:   Allergen Reactions    Sulfa (sulfonamide antibiotics) Anaphylaxis     Past Medical History:   Diagnosis Date    Allergy     Bilateral flank pain 01/24/2025    Diabetes mellitus     diet control    Digestive disorder     Hx: gastric ulcer    Kidney stone     Urinary tract infection      Past Surgical History:   Procedure Laterality Date    COLONOSCOPY, SCREENING, HIGH RISK PATIENT N/A 2/6/2025    Procedure: COLONOSCOPY, SCREENING, HIGH RISK PATIENT;  Surgeon: Pino Cuellar MD;  Location: [address]);  Service: Endoscopy;  Laterality: N/A;  ref by Ryder Nichols MD, peg, portal-ae  Jm/change MD from Rodrigo to /adjust to fellow  case lengths  1/30 pre call complete/mleone    CYSTOSCOPY W/ URETERAL STENT PLACEMENT Left 6/7/2024    Procedure: CYSTOSCOPY, left retrograde pyelogram, left JJ stent;  Surgeon: Humza Saenz MD;  Location: [facility] OR;   Service: Urology;  Laterality: Left;  MAC vs choice    CYSTOURETEROSCOPY, WITH HOLMIUM LASER LITHOTRIPSY OF URETERAL CALCULUS AND STENT INSERTION Left 6/17/2024    Procedure: Cystoscopy, left retrograde pyelogram, left ureteroscopy with holmium laser lithotripsy, stone basket extraction, left JJ stent;  Surgeon: Humza Saenz MD;  Location: [facility] OR;  Service: Urology;  Laterality: Left;  Element    CYSTOURETEROSCOPY, WITH HOLMIUM LASER LITHOTRIPSY OF URETERAL CALCULUS AND STENT INSERTION Right 10/14/2024    Procedure: CYSTOURETEROSCOPY, WITH HOLMIUM LASER LITHOTRIPSY OF URETERAL CALCULUS AND STENT INSERTION;  Surgeon: Humza Saenz MD;  Location: [facility] OR;  Service: Urology;  Laterality: Right;  LMA    EXTRACTION - STONE Left 6/17/2024    Procedure: EXTRACTION - STONE;  Surgeon: Humza Saenz MD;  Location: [facility] OR;  Service: Urology;  Laterality: Left;    REPAIR, WOUND, USING FLAP, AFTER PILONIDAL CYST PROCEDURE N/A 5/28/2025    Procedure: REPAIR,WOUND,USING FLAP,AFTER PILONIDAL CYST PROCEDURE;  Surgeon: KAYLEE Lawler MD;  Location: [facility] OR 2ND FLR;  Service: Colon and Rectal;  Laterality: N/A;    RETROGRADE PYELOGRAPHY N/A 6/7/2024    Procedure: PYELOGRAM, RETROGRADE;  Surgeon: Humza Saenz MD;  Location: [facility] OR;  Service: Urology;  Laterality: N/A;    RETROGRADE PYELOGRAPHY N/A 6/17/2024    Procedure: PYELOGRAM, RETROGRADE;  Surgeon: Humza Saenz MD;  Location: [facility] OR;  Service: Urology;  Laterality: N/A;    RETROGRADE PYELOGRAPHY  10/14/2024    Procedure: PYELOGRAM, RETROGRADE;  Surgeon: Humza Saenz MD;  Location: [facility] OR;  Service: Urology;;    SURGICAL REMOVAL OF PILONIDAL CYST N/A 5/28/2025    Procedure: EXCISION, PILONIDAL CYST;  Surgeon: KAYLEE Lawler MD;  Location: [facility] OR 2ND FLR;  Service: Colon and Rectal;  Laterality: N/A;     No family history on file.  Social History[1]  Review of Systems   Constitutional:  Positive for fatigue. Negative  for fever.   Respiratory:  Negative for shortness of breath.    Cardiovascular:  Negative for chest pain.   Gastrointestinal:  Negative for nausea and vomiting.   Genitourinary:  Negative for dysuria.   Skin:  Positive for wound.   Neurological:  Positive for light-headedness (Resolve), numbness (Resolved) and headaches.       Physical Exam     Initial Vitals [06/07/25 1326]   BP Pulse Resp Temp SpO2   133/76 70 17 -- 100 %      MAP       --         Physical Exam    Nursing note and vitals reviewed.  Constitutional: She appears well-developed and well-nourished. She is not diaphoretic.  Non-toxic appearance. No distress.   Neck: Neck supple.   Cardiovascular:  Normal rate and intact distal pulses.           Pulmonary/Chest: Breath sounds normal. No respiratory distress. She has no wheezes.   Abdominal: Abdomen is soft. She exhibits no distension. There is no abdominal tenderness.   Musculoskeletal:      Cervical back: Normal and neck supple.      Thoracic back: Normal.      Lumbar back: Normal.     Neurological: She is alert and oriented to person, place, and time. GCS score is 15. GCS eye subscore is 4. GCS verbal subscore is 5. GCS motor subscore is 6.   Skin: Skin is warm and dry. Capillary refill takes less than 2 seconds.   Midline incision to lower back.  Well-healing.  No surrounding erythema, warmth, swelling or induration.  No drainage or bleeding.  Surgical drains in place.   Psychiatric: She has a normal mood and affect. Her behavior is normal.         ED Course   Procedures  Labs Reviewed   COMPREHENSIVE METABOLIC PANEL - Abnormal       Result Value    Sodium 143      Potassium 3.5      Chloride 114 (*)     CO2 17 (*)     Glucose 81      BUN 11      Creatinine 1.1      Calcium 9.1      Protein Total 6.7      Albumin 3.2 (*)     Bilirubin Total 0.3      ALP 86      AST 14      ALT 9 (*)     Anion Gap 12      eGFR 58 (*)    URINALYSIS - Abnormal    Color, UA Colorless (*)     Appearance, UA Hazy (*)      pH, UA 6.0      Spec Grav UA 1.005      Protein, UA Negative      Glucose, UA Negative      Ketones, UA Negative      Bilirubin, UA Negative      Blood, UA Negative      Nitrites, UA Negative      Urobilinogen, UA Negative      Leukocyte Esterase, UA 3+ (*)    URINALYSIS MICROSCOPIC - Abnormal    RBC, UA 1      WBC, UA 84 (*)     WBC Clumps, UA Few (*)     Bacteria, UA Many (*)     Squamous Epithelial Cells, UA 2      Microscopic Comment       TROPONIN I - Normal    Troponin-I 0.008     CK - Normal    CPK 39     CBC WITH DIFFERENTIAL - Normal    WBC 9.38      RBC 4.39      HGB 12.9      HCT 38.7      MCV 88      MCH 29.4      MCHC 33.3      RDW 13.9      Platelet Count 209      MPV 10.7      Nucleated RBC 0      Neut % 65.8      Lymph % 26.8      Mono % 5.1      Eos % 1.6      Basophil % 0.4      Imm Grans % 0.3      Neut # 6.17      Lymph # 2.51      Mono # 0.48      Eos # 0.15      Baso # 0.04      Imm Grans # 0.03     CBC W/ AUTO DIFFERENTIAL    Narrative:     The following orders were created for panel order CBC auto differential.  Procedure                               Abnormality         Status                     ---------                               -----------         ------                     CBC with Differential[[id number]]       Normal              Final result                 Please view results for these tests on the individual orders.          Imaging Results    None          Medications   sodium chloride 0.9% bolus 1,000 mL 1,000 mL (1,000 mLs Intravenous New Bag 6/7/25 1550)   acetaminophen tablet 650 mg (650 mg Oral Given 6/7/25 1547)     Medical Decision Making  59-year-old female presents today for evaluation of fatigue after walking in the heat.  On exam she is nontoxic appearing and in no acute distress.  Vitals are stable.  Lungs are clear to auscultation, respirations even and unlabored.  Abdomen is soft and nontender.  Midline incision to lower back.  Well-healing.  No surrounding  erythema, warmth, swelling or induration.  No drainage or bleeding.  Surgical drains in place.    Differential includes isn't limited to:  Heat exposure, rhabdomyolysis, electrolyte abnormality, ACS/MI, JUAN LUIS, UTI, other    CBC unremarkable.  Troponin and CPK within normal limits.  No leukocytosis.  Renal function stable.  She does have leukocyte esterase, bacteria and white blood cells on her UA, will treat for UTI with Keflex.  She was instructed to follow up with her PCP, otherwise return to the ED for new or worsening.    Amount and/or Complexity of Data Reviewed  Labs: ordered. Decision-making details documented in ED Course.    Risk  OTC drugs.  Prescription drug management.               ED Course as of 06/07/25 1656   Sat Jun 07, 2025   1644 CBC auto differential  Unremarkable [EP]   1644 Troponin I: 0.008  wnl [EP]   1644 CPK: 39  wnl [EP]   1644 Leukocyte Esterase, UA(!): 3+ [EP]   1644 Bacteria, UA(!): Many [EP]   1644 WBC Clumps, UA(!): Few [EP]   1644 WBC, UA(!): 84 [EP]   1645 BUN: 11 [EP]   1645 Creatinine: 1.1 [EP]   1645 eGFR(!): 58 [EP]      ED Course User Index  [EP] Yamileth Galeano PA-C                           Clinical Impression:  Final diagnoses:  [R53.83] Fatigue  [T67.9XXA] Heat exposure, initial encounter (Primary)  [N39.0] Urinary tract infection without hematuria, site unspecified          ED Disposition Condition    Discharge Stable          ED Prescriptions       Medication Sig Dispense Start Date End Date Auth. Provider    cephALEXin (KEFLEX) 500 MG capsule Take 1 capsule (500 mg total) by mouth 2 (two) times a day. for 5 days 10 capsule 6/7/2025 6/12/2025 Yamileth Galeano PA-C          Follow-up Information       Follow up With Specialties Details Why Contact Ryder Gutierrez MD Family Medicine Schedule an appointment as soon as possible for a visit   [address] [place] [id number] [phone]      [facility] - Emergency Dept Emergency Medicine  If symptoms worsen  [address] [facility] [place] [id number] [phone]                   [1]   Social History  Tobacco Use    Smoking status: Every Day     Current packs/day: 1.00     Average packs/day: 1 pack/day for 45.4 years (45.4 ttl pk-yrs)     Types: Cigarettes     Start date: 1980     Passive exposure: Current    Smokeless tobacco: Never    Tobacco comments:     Smoking cessation education note: Pt is a 1 pk/day cigarette smoker x 45 yrs. 21 mg nicotine patch ordered Q day. Pt states not ready to quit. Handout provided.    Substance Use Topics    Alcohol use: Not Currently    Drug use: Never        Yamileth Galeano, ORTIZ  06/07/25 4017

## 2025-06-07 NOTE — ED NOTES
Provider made aware pre hospital glucose 166, verbalized okay as results for POCT glucose order at this time.

## 2025-06-08 VITALS
RESPIRATION RATE: 14 BRPM | HEART RATE: 70 BPM | DIASTOLIC BLOOD PRESSURE: 62 MMHG | TEMPERATURE: 98 F | BODY MASS INDEX: 29.77 KG/M2 | HEIGHT: 68 IN | OXYGEN SATURATION: 99 % | SYSTOLIC BLOOD PRESSURE: 145 MMHG | WEIGHT: 196.44 LBS

## 2025-06-09 NOTE — PROGRESS NOTES
CRS Office Visit POSTOP    Referring Md:   No referring provider defined for this encounter.    SUBJECTIVE:     Chief Complaint: pilonidal cyst    History of Present Illness:  Course is as follows:  Patient is a 59 y.o. female presents with pilonidal cyst    She had a prior pilonidal excision and primary closure at age 15.  She did well for 20 years afterwards.  Starting in her early 40s, she started to have recurrent swelling and intermittent drainage of purulence mixed with intermittent bleeding.  She now presents due to increasing pain prompting her visit.  She is an active smoker and rolls her own cigarettes.  She believes she smokes roughly 1 pack per day.  Nondiabetic.  Prior stroke 10 years ago that is fully recovered.  No prior heart attack.  Currently on an aspirin daily.    5/28/25:  excision of pilonidal disease with cleft lift rotational flap of skin and subcutaneous tissue measuring 12cm x 4cm     6/10/25:  Presents for follow-up.  Drainage has decreased.  Pain improving.  Ambulating.  Continues to actively smoke.    Last Colonoscopy: 2.6/2025  Impression:            - The examined portion of the ileum was normal.                          - Diverticulosis in the recto-sigmoid colon, in                          the sigmoid colon, in the descending colon, in the                          transverse colon and in the ascending colon.                          - Non-bleeding internal hemorrhoids.                          - One 2 mm polyp in the cecum, removed with a cold                          snare. Resected and retrieved.  --> tubular adenoma                         - One 1 mm polyp in the transverse colon, removed                          with a cold snare. Resected and retrieved.  --> tubular adenoma                         - One 12 mm polyp in the sigmoid colon, removed                          with a hot snare. Resected and retrieved.  --> tubular adenoma                         Injected. Clips were  "placed     Review of Systems:  Review of Systems   Constitutional:  Negative for chills, diaphoresis, fever, malaise/fatigue and weight loss.   HENT:  Negative for congestion.    Respiratory:  Positive for shortness of breath.    Cardiovascular:  Negative for chest pain and leg swelling.   Gastrointestinal:  Negative for abdominal pain, blood in stool, constipation, nausea and vomiting.   Genitourinary:  Negative for dysuria.   Musculoskeletal:  Positive for back pain. Negative for myalgias.   Skin:  Positive for rash.   Neurological:  Negative for dizziness and weakness.   Endo/Heme/Allergies:  Does not bruise/bleed easily.   Psychiatric/Behavioral:  Negative for depression.        OBJECTIVE:     Vital Signs (Most Recent)  BP (!) 112/55 (BP Location: Left arm, Patient Position: Sitting)   Pulse 66   Ht 5' 7.99" (1.727 m)   Wt 89.1 kg (196 lb 6.9 oz)   BMI 29.87 kg/m²     Physical Exam:  General: White female in no distress   Neuro: alert and oriented x 4.  Moves all extremities.     HEENT: no icterus.  Trachea midline  Respiratory: respirations are even and unlabored  Cardiac: regular rate  Abdomen:  Soft, nontender, no masses  Extremities: Warm dry and intact  Skin: no rashes  Anorectal:  Pilonidal incision healing well.  Drain removed today.      Labs: H&H 14 and 42.  INR normal.  Alb 3.5.  Creatinine 1.1.    Imaging:   CT abd pelvis 1/29/25 reviewed with midline presacral cyst c/w pilonidal disease      ASSESSMENT/PLAN:     Diagnoses and all orders for this visit:    Pilonidal cyst          59 y.o. female with recurrent pilonidal disease with a history of primary excision and closure almost  40 years ago.  On exam, she has a 9 x 2 cm area of pilonidal disease.      She underwent excision with cleft lift rotational flap coverage on 05/28/2025.  She overall is healing very well.  Drain removed today.  Return to clinic in 4 weeks for final wound check.      KAYLEE Lawler MD, FACS, FASCRS  Staff " Surgeon  Colon & Rectal Surgery

## 2025-06-10 ENCOUNTER — OFFICE VISIT (OUTPATIENT)
Dept: SURGERY | Facility: CLINIC | Age: 60
End: 2025-06-10
Payer: MEDICAID

## 2025-06-10 VITALS
DIASTOLIC BLOOD PRESSURE: 55 MMHG | HEIGHT: 68 IN | HEART RATE: 66 BPM | BODY MASS INDEX: 29.77 KG/M2 | SYSTOLIC BLOOD PRESSURE: 112 MMHG | WEIGHT: 196.44 LBS

## 2025-06-10 DIAGNOSIS — L05.91 PILONIDAL CYST: Primary | ICD-10-CM

## 2025-06-10 PROCEDURE — 3074F SYST BP LT 130 MM HG: CPT | Mod: CPTII,,, | Performed by: COLON & RECTAL SURGERY

## 2025-06-10 PROCEDURE — 1159F MED LIST DOCD IN RCRD: CPT | Mod: CPTII,,, | Performed by: COLON & RECTAL SURGERY

## 2025-06-10 PROCEDURE — 3061F NEG MICROALBUMINURIA REV: CPT | Mod: CPTII,,, | Performed by: COLON & RECTAL SURGERY

## 2025-06-10 PROCEDURE — 99024 POSTOP FOLLOW-UP VISIT: CPT | Mod: ,,, | Performed by: COLON & RECTAL SURGERY

## 2025-06-10 PROCEDURE — 3044F HG A1C LEVEL LT 7.0%: CPT | Mod: CPTII,,, | Performed by: COLON & RECTAL SURGERY

## 2025-06-10 PROCEDURE — 99213 OFFICE O/P EST LOW 20 MIN: CPT | Mod: PBBFAC | Performed by: COLON & RECTAL SURGERY

## 2025-06-10 PROCEDURE — 99999 PR PBB SHADOW E&M-EST. PATIENT-LVL III: CPT | Mod: PBBFAC,,, | Performed by: COLON & RECTAL SURGERY

## 2025-06-10 PROCEDURE — 3078F DIAST BP <80 MM HG: CPT | Mod: CPTII,,, | Performed by: COLON & RECTAL SURGERY

## 2025-06-10 PROCEDURE — 3066F NEPHROPATHY DOC TX: CPT | Mod: CPTII,,, | Performed by: COLON & RECTAL SURGERY

## 2025-06-11 ENCOUNTER — NURSE TRIAGE (OUTPATIENT)
Dept: ADMINISTRATIVE | Facility: CLINIC | Age: 60
End: 2025-06-11
Payer: MEDICAID

## 2025-06-11 ENCOUNTER — HOSPITAL ENCOUNTER (EMERGENCY)
Facility: HOSPITAL | Age: 60
Discharge: HOME | End: 2025-06-12
Attending: EMERGENCY MEDICINE
Payer: MEDICAID

## 2025-06-11 DIAGNOSIS — R10.9 FLANK PAIN: Primary | ICD-10-CM

## 2025-06-11 DIAGNOSIS — N30.00 ACUTE CYSTITIS WITHOUT HEMATURIA: ICD-10-CM

## 2025-06-11 LAB
ABSOLUTE EOSINOPHIL (OHS): 0.08 K/UL
ABSOLUTE MONOCYTE (OHS): 0.81 K/UL (ref 0.3–1)
ABSOLUTE NEUTROPHIL COUNT (OHS): 12.07 K/UL (ref 1.8–7.7)
ALBUMIN SERPL BCP-MCNC: 3.3 G/DL (ref 3.5–5.2)
ALP SERPL-CCNC: 101 UNIT/L (ref 40–150)
ALT SERPL W/O P-5'-P-CCNC: 10 UNIT/L (ref 10–44)
ANION GAP (OHS): 9 MMOL/L (ref 8–16)
AST SERPL-CCNC: 10 UNIT/L (ref 11–45)
BACTERIA #/AREA URNS AUTO: ABNORMAL /HPF
BASOPHILS # BLD AUTO: 0.04 K/UL
BASOPHILS NFR BLD AUTO: 0.3 %
BILIRUB SERPL-MCNC: 0.3 MG/DL (ref 0.1–1)
BILIRUB UR QL STRIP.AUTO: NEGATIVE
BUN SERPL-MCNC: 12 MG/DL (ref 6–20)
CALCIUM SERPL-MCNC: 8.5 MG/DL (ref 8.7–10.5)
CHLORIDE SERPL-SCNC: 113 MMOL/L (ref 95–110)
CLARITY UR: CLEAR
CO2 SERPL-SCNC: 19 MMOL/L (ref 23–29)
COLOR UR AUTO: YELLOW
CREAT SERPL-MCNC: 1 MG/DL (ref 0.5–1.4)
ERYTHROCYTE [DISTWIDTH] IN BLOOD BY AUTOMATED COUNT: 14.3 % (ref 11.5–14.5)
GFR SERPLBLD CREATININE-BSD FMLA CKD-EPI: >60 ML/MIN/1.73/M2
GLUCOSE SERPL-MCNC: 101 MG/DL (ref 70–110)
GLUCOSE UR QL STRIP: NEGATIVE
HCT VFR BLD AUTO: 37.5 % (ref 37–48.5)
HGB BLD-MCNC: 12.6 GM/DL (ref 12–16)
HGB UR QL STRIP: ABNORMAL
HOLD SPECIMEN: NORMAL
HOLD SPECIMEN: NORMAL
IMM GRANULOCYTES # BLD AUTO: 0.09 K/UL (ref 0–0.04)
IMM GRANULOCYTES NFR BLD AUTO: 0.6 % (ref 0–0.5)
KETONES UR QL STRIP: NEGATIVE
LEUKOCYTE ESTERASE UR QL STRIP: ABNORMAL
LIPASE SERPL-CCNC: 27 U/L (ref 4–60)
LYMPHOCYTES # BLD AUTO: 2.06 K/UL (ref 1–4.8)
MAGNESIUM SERPL-MCNC: 1.8 MG/DL (ref 1.6–2.6)
MCH RBC QN AUTO: 29.8 PG (ref 27–31)
MCHC RBC AUTO-ENTMCNC: 33.6 G/DL (ref 32–36)
MCV RBC AUTO: 89 FL (ref 82–98)
MICROSCOPIC COMMENT: ABNORMAL
NITRITE UR QL STRIP: NEGATIVE
NUCLEATED RBC (/100WBC) (OHS): 0 /100 WBC
OHS QRS DURATION: 106 MS
OHS QRS DURATION: 112 MS
OHS QTC CALCULATION: 452 MS
OHS QTC CALCULATION: 456 MS
PH UR STRIP: 6 [PH]
PLATELET # BLD AUTO: 220 K/UL (ref 150–450)
PMV BLD AUTO: 10.9 FL (ref 9.2–12.9)
POTASSIUM SERPL-SCNC: 3.7 MMOL/L (ref 3.5–5.1)
PROT SERPL-MCNC: 6.4 GM/DL (ref 6–8.4)
PROT UR QL STRIP: NEGATIVE
RBC # BLD AUTO: 4.23 M/UL (ref 4–5.4)
RBC #/AREA URNS AUTO: 3 /HPF (ref 0–4)
RELATIVE EOSINOPHIL (OHS): 0.5 %
RELATIVE LYMPHOCYTE (OHS): 13.6 % (ref 18–48)
RELATIVE MONOCYTE (OHS): 5.3 % (ref 4–15)
RELATIVE NEUTROPHIL (OHS): 79.7 % (ref 38–73)
SODIUM SERPL-SCNC: 141 MMOL/L (ref 136–145)
SP GR UR STRIP: 1.01
SQUAMOUS #/AREA URNS AUTO: <1 /HPF
UROBILINOGEN UR STRIP-ACNC: NEGATIVE EU/DL
WBC # BLD AUTO: 15.15 K/UL (ref 3.9–12.7)
WBC #/AREA URNS AUTO: 76 /HPF (ref 0–5)

## 2025-06-11 PROCEDURE — 83690 ASSAY OF LIPASE: CPT | Performed by: EMERGENCY MEDICINE

## 2025-06-11 PROCEDURE — 99285 EMERGENCY DEPT VISIT HI MDM: CPT | Mod: 25

## 2025-06-11 PROCEDURE — 25000003 PHARM REV CODE 250: Performed by: EMERGENCY MEDICINE

## 2025-06-11 PROCEDURE — 96375 TX/PRO/DX INJ NEW DRUG ADDON: CPT

## 2025-06-11 PROCEDURE — 81003 URINALYSIS AUTO W/O SCOPE: CPT | Performed by: EMERGENCY MEDICINE

## 2025-06-11 PROCEDURE — 96374 THER/PROPH/DIAG INJ IV PUSH: CPT

## 2025-06-11 PROCEDURE — 83735 ASSAY OF MAGNESIUM: CPT | Performed by: EMERGENCY MEDICINE

## 2025-06-11 PROCEDURE — 85025 COMPLETE CBC W/AUTO DIFF WBC: CPT | Performed by: EMERGENCY MEDICINE

## 2025-06-11 PROCEDURE — 63600175 PHARM REV CODE 636 W HCPCS: Performed by: EMERGENCY MEDICINE

## 2025-06-11 PROCEDURE — 96361 HYDRATE IV INFUSION ADD-ON: CPT

## 2025-06-11 PROCEDURE — 80053 COMPREHEN METABOLIC PANEL: CPT | Performed by: EMERGENCY MEDICINE

## 2025-06-11 RX ORDER — PROCHLORPERAZINE EDISYLATE 5 MG/ML
10 INJECTION INTRAMUSCULAR; INTRAVENOUS
Status: COMPLETED | OUTPATIENT
Start: 2025-06-11 | End: 2025-06-11

## 2025-06-11 RX ORDER — CEFTRIAXONE 1 G/1
1 INJECTION, POWDER, FOR SOLUTION INTRAMUSCULAR; INTRAVENOUS
Status: COMPLETED | OUTPATIENT
Start: 2025-06-11 | End: 2025-06-11

## 2025-06-11 RX ADMIN — IOHEXOL 100 ML: 350 INJECTION, SOLUTION INTRAVENOUS at 11:06

## 2025-06-11 RX ADMIN — PROCHLORPERAZINE EDISYLATE 10 MG: 5 INJECTION INTRAMUSCULAR; INTRAVENOUS at 10:06

## 2025-06-11 RX ADMIN — SODIUM CHLORIDE 1000 ML: 9 INJECTION, SOLUTION INTRAVENOUS at 10:06

## 2025-06-11 RX ADMIN — CEFTRIAXONE SODIUM 1 G: 1 INJECTION, POWDER, FOR SOLUTION INTRAMUSCULAR; INTRAVENOUS at 11:06

## 2025-06-12 ENCOUNTER — TELEPHONE (OUTPATIENT)
Dept: SURGERY | Facility: CLINIC | Age: 60
End: 2025-06-12
Payer: MEDICAID

## 2025-06-12 VITALS
SYSTOLIC BLOOD PRESSURE: 128 MMHG | DIASTOLIC BLOOD PRESSURE: 78 MMHG | HEIGHT: 67 IN | WEIGHT: 196.44 LBS | TEMPERATURE: 99 F | OXYGEN SATURATION: 98 % | HEART RATE: 85 BPM | BODY MASS INDEX: 30.83 KG/M2 | RESPIRATION RATE: 20 BRPM

## 2025-06-12 PROCEDURE — 25500020 PHARM REV CODE 255: Performed by: EMERGENCY MEDICINE

## 2025-06-12 PROCEDURE — 96375 TX/PRO/DX INJ NEW DRUG ADDON: CPT

## 2025-06-12 PROCEDURE — 63600175 PHARM REV CODE 636 W HCPCS: Mod: JZ,TB | Performed by: EMERGENCY MEDICINE

## 2025-06-12 PROCEDURE — 87186 SC STD MICRODIL/AGAR DIL: CPT | Performed by: EMERGENCY MEDICINE

## 2025-06-12 RX ORDER — CEPHALEXIN 500 MG/1
500 CAPSULE ORAL EVERY 12 HOURS
Qty: 14 CAPSULE | Refills: 0 | Status: SHIPPED | OUTPATIENT
Start: 2025-06-12 | End: 2025-06-19

## 2025-06-12 RX ORDER — KETOROLAC TROMETHAMINE 30 MG/ML
15 INJECTION, SOLUTION INTRAMUSCULAR; INTRAVENOUS
Status: COMPLETED | OUTPATIENT
Start: 2025-06-12 | End: 2025-06-12

## 2025-06-12 RX ORDER — ONDANSETRON 4 MG/1
4 TABLET, ORALLY DISINTEGRATING ORAL EVERY 6 HOURS PRN
Qty: 10 TABLET | Refills: 0 | Status: SHIPPED | OUTPATIENT
Start: 2025-06-12

## 2025-06-12 RX ADMIN — KETOROLAC TROMETHAMINE 15 MG: 30 INJECTION, SOLUTION INTRAMUSCULAR; INTRAVENOUS at 01:06

## 2025-06-12 NOTE — TELEPHONE ENCOUNTER
Spoke with patient and discussed recent ER visit. Patient states that she has UTI  and has prescription for antibiotics, but is having issues with insurance company and coverage. Instructed to call back if any new symptoms arise. Patient verbalizes understanding.

## 2025-06-12 NOTE — DISCHARGE INSTRUCTIONS
Please make sure you are drinking plenty of fluids.  I recommend taking ibuprofen 600 mg every 6 hours with food and/or Tylenol 650 mg every 6 hours as needed to help manage your symptoms.  Please follow-up with your primary care physician this week for re-evaluation.  Please return with any new or worsening symptoms.

## 2025-06-12 NOTE — ED PROVIDER NOTES
Encounter Date: 6/11/2025       History     Chief Complaint   Patient presents with    Flank Pain     L flank pain and worsening UTI x 1  week. Diagnosed with UTI 1 week ago, unable to fill antibiotic script due to insurance. Denies taking tylenol or motrin today. Now experiencing incontinence. EMS gave 15 mg of tordol and 4 mg of zofran     59-year-old female presents emergency department complaining of left flank pain, right hip pain, and pain to her pilonidal cyst.  States she has been having some dysuria and urgency and occasionally dribbling.  States she was seen here last week and told she had a UTI.  However the pharmacy missed up her Medicaid and she was unable to fill her prescription for Keflex.  Pain worsened this evening prompting her to come to the emergency department.  Notes nausea without emesis.  States she has been feeling hot and cold and like her fevers increasing.  Of note she was seen yesterday by her general surgeon with evaluation of her pilonidal cyst/surgery area and was told that it was healing well.      Review of patient's allergies indicates:   Allergen Reactions    Sulfa (sulfonamide antibiotics) Anaphylaxis     Past Medical History:   Diagnosis Date    Allergy     Bilateral flank pain 01/24/2025    Diabetes mellitus     diet control    Digestive disorder     Hx: gastric ulcer    Kidney stone     Urinary tract infection      Past Surgical History:   Procedure Laterality Date    COLONOSCOPY, SCREENING, HIGH RISK PATIENT N/A 2/6/2025    Procedure: COLONOSCOPY, SCREENING, HIGH RISK PATIENT;  Surgeon: Pino Cuellar MD;  Location: [address]);  Service: Endoscopy;  Laterality: N/A;  ref by Ryder Nichols MD, peg, portal-ae  Jm/change MD from Rodrigo to /adjust to fellow  case lengths  1/30 pre call complete/mleone    CYSTOSCOPY W/ URETERAL STENT PLACEMENT Left 6/7/2024    Procedure: CYSTOSCOPY, left retrograde pyelogram, left JJ stent;  Surgeon: Letty  Humza DIAZ MD;  Location: [facility] OR;  Service: Urology;  Laterality: Left;  MAC vs choice    CYSTOURETEROSCOPY, WITH HOLMIUM LASER LITHOTRIPSY OF URETERAL CALCULUS AND STENT INSERTION Left 6/17/2024    Procedure: Cystoscopy, left retrograde pyelogram, left ureteroscopy with holmium laser lithotripsy, stone basket extraction, left JJ stent;  Surgeon: Humza Saenz MD;  Location: [facility] OR;  Service: Urology;  Laterality: Left;  Element    CYSTOURETEROSCOPY, WITH HOLMIUM LASER LITHOTRIPSY OF URETERAL CALCULUS AND STENT INSERTION Right 10/14/2024    Procedure: CYSTOURETEROSCOPY, WITH HOLMIUM LASER LITHOTRIPSY OF URETERAL CALCULUS AND STENT INSERTION;  Surgeon: Humza Saenz MD;  Location: [facility] OR;  Service: Urology;  Laterality: Right;  LMA    EXTRACTION - STONE Left 6/17/2024    Procedure: EXTRACTION - STONE;  Surgeon: Humza Saenz MD;  Location: [facility] OR;  Service: Urology;  Laterality: Left;    REPAIR, WOUND, USING FLAP, AFTER PILONIDAL CYST PROCEDURE N/A 5/28/2025    Procedure: REPAIR,WOUND,USING FLAP,AFTER PILONIDAL CYST PROCEDURE;  Surgeon: KAYLEE Lawler MD;  Location: [facility] OR 2ND FLR;  Service: Colon and Rectal;  Laterality: N/A;    RETROGRADE PYELOGRAPHY N/A 6/7/2024    Procedure: PYELOGRAM, RETROGRADE;  Surgeon: Humza Saenz MD;  Location: [facility] OR;  Service: Urology;  Laterality: N/A;    RETROGRADE PYELOGRAPHY N/A 6/17/2024    Procedure: PYELOGRAM, RETROGRADE;  Surgeon: Humza Saenz MD;  Location: [facility] OR;  Service: Urology;  Laterality: N/A;    RETROGRADE PYELOGRAPHY  10/14/2024    Procedure: PYELOGRAM, RETROGRADE;  Surgeon: Humza Saenz MD;  Location: [facility] OR;  Service: Urology;;    SURGICAL REMOVAL OF PILONIDAL CYST N/A 5/28/2025    Procedure: EXCISION, PILONIDAL CYST;  Surgeon: KAYLEE Lawler MD;  Location: [facility] OR 2ND FLR;  Service: Colon and Rectal;  Laterality: N/A;     No family history on file.  Social History[1]  Review of Systems    Constitutional:  Positive for fever. Negative for chills.   HENT:  Negative for congestion.    Respiratory:  Negative for cough and shortness of breath.    Cardiovascular:  Negative for chest pain.   Gastrointestinal:  Positive for abdominal pain.   Musculoskeletal:  Positive for back pain.   Neurological:  Negative for headaches.       Physical Exam     Initial Vitals [06/11/25 2126]   BP Pulse Resp Temp SpO2   129/69 90 20 99 °F (37.2 °C) 99 %      MAP       --         Physical Exam    Nursing note and vitals reviewed.  Constitutional: She appears well-developed and well-nourished. No distress.   HENT:   Head: Normocephalic and atraumatic.   Eyes: Conjunctivae and EOM are normal. Pupils are equal, round, and reactive to light.   Neck: Neck supple. No tracheal deviation present.   Normal range of motion.  Cardiovascular:  Normal rate and intact distal pulses.           Pulmonary/Chest: No respiratory distress.   Abdominal: Abdomen is soft. She exhibits no distension. There is abdominal tenderness (Lower abdomen). There is no rebound and no guarding.   Musculoskeletal:         General: No edema. Normal range of motion.      Cervical back: Normal range of motion and neck supple.     Neurological: She is alert and oriented to person, place, and time. She has normal strength. No cranial nerve deficit. GCS score is 15. GCS eye subscore is 4. GCS verbal subscore is 5. GCS motor subscore is 6.   Skin: Skin is warm and dry.         ED Course   Procedures  Labs Reviewed   COMPREHENSIVE METABOLIC PANEL - Abnormal       Result Value    Sodium 141      Potassium 3.7      Chloride 113 (*)     CO2 19 (*)     Glucose 101      BUN 12      Creatinine 1.0      Calcium 8.5 (*)     Protein Total 6.4      Albumin 3.3 (*)     Bilirubin Total 0.3            AST 10 (*)     ALT 10      Anion Gap 9      eGFR >60     URINALYSIS - Abnormal    Color, UA Yellow      Appearance, UA Clear      pH, UA 6.0      Spec Grav UA 1.015       Protein, UA Negative      Glucose, UA Negative      Ketones, UA Negative      Bilirubin, UA Negative      Blood, UA 1+ (*)     Nitrites, UA Negative      Urobilinogen, UA Negative      Leukocyte Esterase, UA 2+ (*)    CBC WITH DIFFERENTIAL - Abnormal    WBC 15.15 (*)     RBC 4.23      HGB 12.6      HCT 37.5      MCV 89      MCH 29.8      MCHC 33.6      RDW 14.3      Platelet Count 220      MPV 10.9      Nucleated RBC 0      Neut % 79.7 (*)     Lymph % 13.6 (*)     Mono % 5.3      Eos % 0.5      Basophil % 0.3      Imm Grans % 0.6 (*)     Neut # 12.07 (*)     Lymph # 2.06      Mono # 0.81      Eos # 0.08      Baso # 0.04      Imm Grans # 0.09 (*)    URINALYSIS MICROSCOPIC - Abnormal    RBC, UA 3      WBC, UA 76 (*)     Bacteria, UA Rare      Squamous Epithelial Cells, UA <1      Microscopic Comment       LIPASE - Normal    Lipase Level 27     MAGNESIUM - Normal    Magnesium  1.8     CULTURE, URINE   CBC W/ AUTO DIFFERENTIAL    Narrative:     The following orders were created for panel order CBC auto differential.  Procedure                               Abnormality         Status                     ---------                               -----------         ------                     CBC with Differential[[id number]]       Abnormal            Final result                 Please view results for these tests on the individual orders.   EXTRA TUBES    Narrative:     The following orders were created for panel order EXTRA TUBES.  Procedure                               Abnormality         Status                     ---------                               -----------         ------                     Lavender Top Hold[[id number]]                               Final result               Gold Top Hold[[id number]]                                   Final result                 Please view results for these tests on the individual orders.   LAVENDER TOP HOLD    Extra Tube Hold for add-ons.     GOLD TOP HOLD    Extra Tube  Hold for add-ons.                X-Rays:   Independently Interpreted Readings:   Other Readings:  CT abdomen and pelvis independently interpreted by me pending radiology review: No acute perforation or obstruction appreciated    Imaging Results              CT Abdomen Pelvis With IV Contrast NO Oral Contrast (Final result)  Result time 06/12/25 00:07:17      Final result by Don Jacobsen DO (06/12/25 00:07:17)                   Impression:      1. No acute abnormality of the abdomen or pelvis.  2. Multiple indeterminate bilateral adrenal nodules, stable from prior.  3. Stable right hepatic lobe hemangioma.  4. Colonic diverticulosis without acute diverticulitis.      Electronically signed by: Don Jacobsen  Date:    06/12/2025  Time:    00:07               Narrative:    EXAMINATION:  CT ABDOMEN PELVIS WITH IV CONTRAST    CLINICAL HISTORY:  LLQ abdominal pain;    TECHNIQUE:  Axial CT images with sagittal and coronal reformats were obtained of the abdomen and pelvis from the hemidiaphragms through the symphysis pubis after the administration of 100mL Omnipaque 350.    COMPARISON:  MRI abdomen from 04/21/2025.    FINDINGS:  Lung Bases: Clear.    Heart: Heart size is normal.  No pericardial effusion.    Liver: There is an enhancing lesion in the right hepatic lobe measuring 1.9 x 1.5 cm (series 2, image 31).  This was previously described as a hemangioma on MRI from 04/21/2025 and is unchanged in size.  The liver is enlarged.  The portal vasculature is patent.    Biliary tract: No intrahepatic or extrahepatic biliary ductal dilatation.    Gallbladder: No radiodense gallstone. No wall thickening or pericholecystic fluid.    Pancreas: Normal. No pancreatic ductal dilatation.    Spleen: Normal size without focal lesion.    Adrenals: There are 2 right and 2 left indeterminate adrenal nodules, unchanged in size from prior.  The larger nodule in the left measures 4.2 x 2.0 cm, and the larger nodule in the right measures  1.8 x 0.9 cm.    Kidneys and urinary collecting systems: There is a simple cyst in the right kidney midpole.  There is a too small to characterize hypodensity in the right kidney superior pole.  No hydronephrosis or urolithiasis.    Lymph nodes: None enlarged.    Stomach and bowel: The stomach is normal.  Loops of small and large bowel are normal in caliber without evidence for inflammation or obstruction.  There is colonic diverticulosis without acute diverticulitis.  The appendix is not seen.    Peritoneum and mesentery: No ascites or free intraperitoneal air.  No abdominal fluid collection.  There are surgical clips in the right lower quadrant.    Vasculature: There is moderate atherosclerosis.  No aneurysm.    Urinary bladder: No wall thickening.    Reproductive organs: The uterus is surgically absent.    Body wall: There is a fat containing left ventral abdominal wall hernia.    Musculoskeletal: No aggressive osseous lesion.  There are degenerative changes.                                      Medications   sodium chloride 0.9% bolus 1,000 mL 1,000 mL (0 mLs Intravenous Stopped 6/12/25 0037)   prochlorperazine injection Soln 10 mg (10 mg Intravenous Given 6/11/25 2256)   iohexoL (OMNIPAQUE 350) injection 100 mL (100 mLs Intravenous Given 6/11/25 2323)   cefTRIAXone injection 1 g (1 g Intravenous Given 6/11/25 2341)   ketorolac injection 15 mg (15 mg Intravenous Given 6/12/25 0105)     Medical Decision Making  59-year-old female presents emergency department complaining of left-sided flank and right-sided hip pain as well as low back pain, urinary frequency and urgency      Differential:  Diverticulitis, cholecystitis, pancreatitis, appendicitis, obstruction, constipation UTI, pyelonephritis, kidney stone, gastroenteritis,      Patient given IV fluid, Compazine, Toradol, Rocephin.  On re-evaluation patient is resting comfortably.  She is feeling somewhat better.  Informed her of results well as plan to discharge  with prescriptions for antiemetic and Keflex.  Touch base with her PCP office and informed them of plan to discharge, reviewed patient's results, they will try and get patient in this week for re-evaluation and possible assistance with the patient's Medicaid card which is denying her insurance apparently.  Patient given strict return precautions.  Vital signs stable.    Problems Addressed:  Acute cystitis without hematuria: acute illness or injury  Flank pain: acute illness or injury    Amount and/or Complexity of Data Reviewed  External Data Reviewed: notes.     Details: Reviewed most recent colon surgery note documenting baseline medications and past medical history  Labs: ordered.     Details: CBC with leukocytosis, normal H&H; CMP with normal renal and liver function tests, normal electrolytes; UA concerning for UTI  Radiology: ordered and independent interpretation performed. Decision-making details documented in ED Course.    Risk  OTC drugs.  Prescription drug management.  Parenteral controlled substances.                                      Clinical Impression:  Final diagnoses:  [R10.9] Flank pain (Primary)  [N30.00] Acute cystitis without hematuria          ED Disposition Condition    Discharge Stable          ED Prescriptions       Medication Sig Dispense Start Date End Date Auth. Provider    cephALEXin (KEFLEX) 500 MG capsule Take 1 capsule (500 mg total) by mouth every 12 (twelve) hours. for 7 days 14 capsule 6/12/2025 6/19/2025 Franck Solis MD    ondansetron (ZOFRAN-ODT) 4 MG TbDL Take 1 tablet (4 mg total) by mouth every 6 (six) hours as needed (Nausea). 10 tablet 6/12/2025 -- Franck Solis MD          Follow-up Information       Follow up With Specialties Details Why Contact Info Additional Information    [facility] Schedule an appointment as soon as possible for a visit   [address]  [facility] [address] [phone] Please  lis in [address] or [address] and use Eyad correa. Take Medical Office Bldg. elevators.                   [1]   Social History  Tobacco Use    Smoking status: Every Day     Current packs/day: 1.00     Average packs/day: 1 pack/day for 45.4 years (45.4 ttl pk-yrs)     Types: Cigarettes     Start date: 1980     Passive exposure: Current    Smokeless tobacco: Never    Tobacco comments:     Smoking cessation education note: Pt is a 1 pk/day cigarette smoker x 45 yrs. 21 mg nicotine patch ordered Q day. Pt states not ready to quit. Handout provided.    Substance Use Topics    Alcohol use: Not Currently    Drug use: Never        Franck Solis MD  06/12/25 0137

## 2025-06-12 NOTE — TELEPHONE ENCOUNTER
Patient says she took her temp twice and it is rising. 101.4 and 101.5, she is freezing and sweating. She had surgery on her tailbone and it is numb. Surgery was on 5/28 and she saw the dr yesterday. Headache, 9/10 and feels nauseous. She has a pain under her ribs on left side. Last urinated an hour ago. She says she has no control over her urine and she has a UTI. She says she never got the antibiotics because they messed up her medicaid. Care advice discussed. Patient states she will not be able to get any antibiotics from the pharmacy any time soon. Patient advised to go to the ER since they are the only ones open at this time. Please contact caller directly to discuss further care advice.    Reason for Disposition   Fever > 100.4 F (38.0 C)    Additional Information   Negative: [1] Widespread rash AND [2] bright red, sunburn-like   Negative: [1] SEVERE headache (e.g., excruciating) AND [2] after spinal (epidural) anesthesia   Negative: [1] Vomiting AND [2] persists > 4 hours   Negative: [1] Vomiting AND [2] abdomen looks much more swollen than usual   Negative: [1] Drinking very little AND [2] dehydration suspected (e.g., no urine > 12 hours, very dry mouth, very lightheaded)   Negative: Patient sounds very sick or weak to the triager   Negative: Sounds like a serious complication to the triager    Protocols used: Post-Op Symptoms and [id number]-WS

## 2025-06-12 NOTE — TELEPHONE ENCOUNTER
Attempted to contact pt x2, unable to leave a VM message.         Reason for Disposition   Second attempt to contact caller AND no contact made. Phone number verified.    Protocols used: No Contact or Duplicate Contact Call-A-AH

## 2025-06-13 ENCOUNTER — RESULTS FOLLOW-UP (OUTPATIENT)
Dept: EMERGENCY MEDICINE | Facility: HOSPITAL | Age: 60
End: 2025-06-13

## 2025-06-14 LAB — BACTERIA UR CULT: ABNORMAL

## 2025-06-17 ENCOUNTER — OFFICE VISIT (OUTPATIENT)
Dept: PRIMARY CARE CLINIC | Facility: CLINIC | Age: 60
End: 2025-06-17
Payer: MEDICAID

## 2025-06-17 VITALS
SYSTOLIC BLOOD PRESSURE: 153 MMHG | WEIGHT: 194.88 LBS | DIASTOLIC BLOOD PRESSURE: 86 MMHG | BODY MASS INDEX: 33.27 KG/M2 | HEART RATE: 86 BPM | OXYGEN SATURATION: 98 % | HEIGHT: 64 IN

## 2025-06-17 DIAGNOSIS — N30.00 ACUTE CYSTITIS WITHOUT HEMATURIA: Primary | ICD-10-CM

## 2025-06-17 PROBLEM — R82.90 ABNORMAL URINE ODOR: Status: RESOLVED | Noted: 2025-01-24 | Resolved: 2025-06-17

## 2025-06-17 PROCEDURE — 1159F MED LIST DOCD IN RCRD: CPT | Mod: CPTII,,,

## 2025-06-17 PROCEDURE — 99999 PR PBB SHADOW E&M-EST. PATIENT-LVL IV: CPT | Mod: PBBFAC,,,

## 2025-06-17 PROCEDURE — 1160F RVW MEDS BY RX/DR IN RCRD: CPT | Mod: CPTII,,,

## 2025-06-17 PROCEDURE — 99214 OFFICE O/P EST MOD 30 MIN: CPT | Mod: PBBFAC,PN

## 2025-06-17 PROCEDURE — 99214 OFFICE O/P EST MOD 30 MIN: CPT | Mod: S$PBB,,,

## 2025-06-17 PROCEDURE — 3008F BODY MASS INDEX DOCD: CPT | Mod: CPTII,,,

## 2025-06-17 PROCEDURE — 3079F DIAST BP 80-89 MM HG: CPT | Mod: CPTII,,,

## 2025-06-17 PROCEDURE — 3061F NEG MICROALBUMINURIA REV: CPT | Mod: CPTII,,,

## 2025-06-17 PROCEDURE — 3044F HG A1C LEVEL LT 7.0%: CPT | Mod: CPTII,,,

## 2025-06-17 PROCEDURE — 3066F NEPHROPATHY DOC TX: CPT | Mod: CPTII,,,

## 2025-06-17 PROCEDURE — 3077F SYST BP >= 140 MM HG: CPT | Mod: CPTII,,,

## 2025-06-19 ENCOUNTER — PATIENT OUTREACH (OUTPATIENT)
Facility: OTHER | Age: 60
End: 2025-06-19
Payer: MEDICAID

## 2025-06-19 NOTE — PROGRESS NOTES
Johana Bolanos, Patient Care Assistant  ED Navigator  Emergency Department    Project: [facility] ED Navigator  Role: Community Health Worker    Date: 06/19/2025  Patient Name: Josselyn Tarango  MRN: [id number]  PCP: Lei Hernandez MD    Assessment:     Josselyn Tarango is a 59 y.o. female who has presented to ED for Flank pain . Patient has visited the ED 3 times in the past 3 months. Patient did contact PCP.     ED Navigator Initial Assessment    ED Navigator Enrollment Documentation  Consent to Services  Contact  Transportation  Insurance Coverage  Specialist Appointment  PCP Follow Up Appointment  Medications  Psychological  Food  Communication/Education  Other Financial Concerns  Other Social Barriers/Concerns  Primary Barrier         Social History     Socioeconomic History    Marital status: Single   Tobacco Use    Smoking status: Every Day     Current packs/day: 1.00     Average packs/day: 1 pack/day for 45.5 years (45.5 ttl pk-yrs)     Types: Cigarettes     Start date: 1980     Passive exposure: Current    Smokeless tobacco: Never    Tobacco comments:     Smoking cessation education note: Pt is a 1 pk/day cigarette smoker x 45 yrs. 21 mg nicotine patch ordered Q day. Pt states not ready to quit. Handout provided.    Substance and Sexual Activity    Alcohol use: Not Currently    Drug use: Never    Sexual activity: Not Currently     Social Drivers of Health     Financial Resource Strain: Medium Risk (3/3/2025)    Overall Financial Resource Strain (CARDIA)     Difficulty of Paying Living Expenses: Somewhat hard   Food Insecurity: Food Insecurity Present (3/3/2025)    Hunger Vital Sign     Worried About Running Out of Food in the Last Year: Sometimes true     Ran Out of Food in the Last Year: Often true   Transportation Needs: Unmet Transportation Needs (3/3/2025)    PRAPARE - Transportation     Lack of Transportation (Medical): Yes     Lack of Transportation (Non-Medical): Yes   Physical Activity: Insufficiently  Active (3/3/2025)    Exercise Vital Sign     Days of Exercise per Week: 1 day     Minutes of Exercise per Session: 30 min   Stress: Stress Concern Present (3/3/2025)    [employer] [place] [employer] - Occupational Stress Questionnaire     Feeling of Stress : To some extent   Housing Stability: High Risk (3/3/2025)    Housing Stability Vital Sign     Unable to Pay for Housing in the Last Year: Yes     Number of Times Moved in the Last Year: 1     Homeless in the Last Year: Yes       Plan:         Appointment made with: Lei Hernandez MD

## 2025-06-30 NOTE — PROGRESS NOTES
"Subjective:       Patient ID: Josselyn Tarango is a 59 y.o. female.    Chief Complaint: UTI  HPI    Josselyn Tarango is a 59 y.o. year old female  who comes to clinic for after recent ED visit.    RECENT EMERGENCY ROOM VISITS:  She reports two recent ER visits - first for heat exhaustion after premature ambulation following pilonidal cyst surgery, and second for flank pain with UTI symptoms due to concerns about sepsis. She experiences intermittent flank pain affecting both anterior and posterior regions.    GENITOURINARY:  She has been diagnosed with bladder prolapse by her urology specialist and experiences urinary incontinence, particularly nocturnal, requiring constant use of bladder pads. She has a history of kidney stones with three surgical interventions and 4-5 episodes of spontaneous passage. She previously developed sepsis from a kidney infection resulting in week-long hospitalization due to delayed diagnosis. She has a history of recurrent UTIs and is currently on day 2 of antibiotic therapy for an active UTI.      ROS negative except as above  Objective:      BP (!) 153/86 (BP Location: Left arm, Patient Position: Sitting)   Pulse 86   Ht 5' 4" (1.626 m)   Wt 88.4 kg (194 lb 14.2 oz)   SpO2 98%   BMI 33.45 kg/m²    Physical Exam  Vitals reviewed.   Constitutional:       Appearance: Normal appearance.   HENT:      Head: Normocephalic.      Mouth/Throat:      Mouth: Mucous membranes are moist.   Cardiovascular:      Rate and Rhythm: Normal rate and regular rhythm.      Pulses: Normal pulses.      Heart sounds: Normal heart sounds.   Pulmonary:      Effort: Pulmonary effort is normal.      Breath sounds: Normal breath sounds. No wheezing or rhonchi.   Abdominal:      General: Abdomen is flat. There is no distension.   Musculoskeletal:         General: No swelling. Normal range of motion.      Cervical back: Normal range of motion.   Skin:     General: Skin is warm.      Coloration: Skin is not " jaundiced.   Neurological:      Mental Status: She is alert.         Assessment:       1. Acute cystitis without hematuria          Plan:       1. Acute cystitis without hematuria (Primary)  Patient recently went to ER for UTI. Antibiotics were prescribed, patient has been taking medication.  She was given ceftriaxone in the ER and is currently taking Keflex. Patient has been tolerating medication well.      Follow up if symptoms worsen or fail to improve.      Lei Hernandez M.D.    This note was generated with the assistance of ambient listening technology. Verbal consent was obtained by the patient and accompanying visitor(s) for the recording of patient appointment to facilitate this note. I attest to having reviewed and edited the generated note for accuracy, though some syntax or spelling errors may persist. Please contact the author of this note for any clarification.     I spent a total of 30 minutes on the day of the visit.This includes face to face time and non-face to face time preparing to see the patient (eg, review of tests), obtaining and/or reviewing separately obtained history, documenting clinical information in the electronic or other health record, independently interpreting results and communicating results to the patient/family/caregiver, or care coordinator.

## 2025-07-07 ENCOUNTER — TELEPHONE (OUTPATIENT)
Dept: UROGYNECOLOGY | Facility: CLINIC | Age: 60
End: 2025-07-07
Payer: MEDICAID

## 2025-07-07 NOTE — PROGRESS NOTES
CRS Office Visit POSTOP    Referring Md:   No referring provider defined for this encounter.    SUBJECTIVE:     Chief Complaint: pilonidal cyst    History of Present Illness:  Course is as follows:  Patient is a 59 y.o. female presents with pilonidal cyst    She had a prior pilonidal excision and primary closure at age 15.  She did well for 20 years afterwards.  Starting in her early 40s, she started to have recurrent swelling and intermittent drainage of purulence mixed with intermittent bleeding.  She now presents due to increasing pain prompting her visit.  She is an active smoker and rolls her own cigarettes.  She believes she smokes roughly 1 pack per day.  Nondiabetic.  Prior stroke 10 years ago that is fully recovered.  No prior heart attack.  Currently on an aspirin daily.    5/28/25:  excision of pilonidal disease with cleft lift rotational flap of skin and subcutaneous tissue measuring 12cm x 4cm     6/10/25:  Presents for follow-up.  Drainage has decreased.  Pain improving.  Ambulating.  Continues to actively smoke.    7/8/25: no perianal drainage.  Minimal pain.  Ongoing smoking.  Complains of pain on her left chest from a small lump.      Last Colonoscopy: 2.6/2025  Impression:            - The examined portion of the ileum was normal.                          - Diverticulosis in the recto-sigmoid colon, in                          the sigmoid colon, in the descending colon, in the                          transverse colon and in the ascending colon.                          - Non-bleeding internal hemorrhoids.                          - One 2 mm polyp in the cecum, removed with a cold                          snare. Resected and retrieved.  --> tubular adenoma                         - One 1 mm polyp in the transverse colon, removed                          with a cold snare. Resected and retrieved.  --> tubular adenoma                         - One 12 mm polyp in the sigmoid colon, removed              "             with a hot snare. Resected and retrieved.  --> tubular adenoma                         Injected. Clips were placed     Review of Systems:  Review of Systems   Constitutional:  Negative for chills, diaphoresis, fever, malaise/fatigue and weight loss.   HENT:  Negative for congestion.    Respiratory:  Positive for shortness of breath.    Cardiovascular:  Negative for chest pain and leg swelling.   Gastrointestinal:  Negative for abdominal pain, blood in stool, constipation, nausea and vomiting.   Genitourinary:  Negative for dysuria.   Musculoskeletal:  Positive for back pain. Negative for myalgias.   Skin:  Positive for rash.   Neurological:  Negative for dizziness and weakness.   Endo/Heme/Allergies:  Does not bruise/bleed easily.   Psychiatric/Behavioral:  Negative for depression.        OBJECTIVE:     Vital Signs (Most Recent)  BP (!) 147/79 (BP Location: Right arm, Patient Position: Sitting)   Pulse 91   Resp 18   Ht 5' 4" (1.626 m)   Wt 87.5 kg (192 lb 12.7 oz)   BMI 33.09 kg/m²     Physical Exam:  General: White female in no distress   Neuro: alert and oriented x 4.  Moves all extremities.     HEENT: no icterus.  Trachea midline  Respiratory: respirations are even and unlabored  Cardiac: regular rate.  Left chest 1cm abscess treated with incision and drainage.    Abdomen:  Soft, nontender, no masses  Extremities: Warm dry and intact  Skin: no rashes  Anorectal:  Pilonidal incision healing well.        Labs: H&H 14 and 42.  INR normal.  Alb 3.5.  Creatinine 1.1.    Imaging:   CT abd pelvis 1/29/25 reviewed with midline presacral cyst c/w pilonidal disease      ASSESSMENT/PLAN:     Josselyn was seen today for post-op evaluation.    Diagnoses and all orders for this visit:    Pilonidal cyst        59 y.o. female with recurrent pilonidal disease with a history of primary excision and closure almost  40 years ago.  On exam, she has a 9 x 2 cm area of pilonidal disease.      She underwent excision " with cleft lift rotational flap coverage on 05/28/2025.  She overall is healing very well.  She can follow up PRN.    WBebe Lawler MD, FACS, FASCRS  Staff Surgeon  Colon & Rectal Surgery

## 2025-07-07 NOTE — TELEPHONE ENCOUNTER
Informed pt EUSEBIO Spence won't be in the office this morning and needed to reschedule her appt. Pt voiced understanding and requested that she be rescheduled to next week. Call ended.      Appt rescheduled to Tuesday 7/15/25 at 11 am, per EUSEBIO Spence.

## 2025-07-08 ENCOUNTER — OFFICE VISIT (OUTPATIENT)
Dept: SURGERY | Facility: CLINIC | Age: 60
End: 2025-07-08
Payer: MEDICAID

## 2025-07-08 VITALS
HEART RATE: 91 BPM | SYSTOLIC BLOOD PRESSURE: 147 MMHG | HEIGHT: 64 IN | BODY MASS INDEX: 32.92 KG/M2 | DIASTOLIC BLOOD PRESSURE: 79 MMHG | WEIGHT: 192.81 LBS | RESPIRATION RATE: 18 BRPM

## 2025-07-08 DIAGNOSIS — L05.91 PILONIDAL CYST: Primary | ICD-10-CM

## 2025-07-08 PROCEDURE — 3066F NEPHROPATHY DOC TX: CPT | Mod: CPTII,,, | Performed by: COLON & RECTAL SURGERY

## 2025-07-08 PROCEDURE — 99214 OFFICE O/P EST MOD 30 MIN: CPT | Mod: PBBFAC | Performed by: COLON & RECTAL SURGERY

## 2025-07-08 PROCEDURE — 3061F NEG MICROALBUMINURIA REV: CPT | Mod: CPTII,,, | Performed by: COLON & RECTAL SURGERY

## 2025-07-08 PROCEDURE — 99999 PR PBB SHADOW E&M-EST. PATIENT-LVL IV: CPT | Mod: PBBFAC,,, | Performed by: COLON & RECTAL SURGERY

## 2025-07-08 PROCEDURE — 99024 POSTOP FOLLOW-UP VISIT: CPT | Mod: ,,, | Performed by: COLON & RECTAL SURGERY

## 2025-07-08 PROCEDURE — 3077F SYST BP >= 140 MM HG: CPT | Mod: CPTII,,, | Performed by: COLON & RECTAL SURGERY

## 2025-07-08 PROCEDURE — 3078F DIAST BP <80 MM HG: CPT | Mod: CPTII,,, | Performed by: COLON & RECTAL SURGERY

## 2025-07-08 PROCEDURE — 1159F MED LIST DOCD IN RCRD: CPT | Mod: CPTII,,, | Performed by: COLON & RECTAL SURGERY

## 2025-07-08 PROCEDURE — 1160F RVW MEDS BY RX/DR IN RCRD: CPT | Mod: CPTII,,, | Performed by: COLON & RECTAL SURGERY

## 2025-07-08 PROCEDURE — 3044F HG A1C LEVEL LT 7.0%: CPT | Mod: CPTII,,, | Performed by: COLON & RECTAL SURGERY

## 2025-07-09 ENCOUNTER — OFFICE VISIT (OUTPATIENT)
Dept: HEPATOLOGY | Facility: CLINIC | Age: 60
End: 2025-07-09
Payer: MEDICAID

## 2025-07-09 VITALS
WEIGHT: 193.13 LBS | SYSTOLIC BLOOD PRESSURE: 132 MMHG | HEART RATE: 116 BPM | TEMPERATURE: 97 F | HEIGHT: 64 IN | OXYGEN SATURATION: 98 % | RESPIRATION RATE: 18 BRPM | DIASTOLIC BLOOD PRESSURE: 94 MMHG | BODY MASS INDEX: 32.97 KG/M2

## 2025-07-09 DIAGNOSIS — D18.03 LIVER HEMANGIOMA: ICD-10-CM

## 2025-07-09 DIAGNOSIS — Z87.898 HISTORY OF ALCOHOL USE: ICD-10-CM

## 2025-07-09 DIAGNOSIS — R93.2 ABNORMAL FINDING ON IMAGING OF LIVER: ICD-10-CM

## 2025-07-09 PROCEDURE — 1160F RVW MEDS BY RX/DR IN RCRD: CPT | Mod: CPTII,,, | Performed by: INTERNAL MEDICINE

## 2025-07-09 PROCEDURE — 99999 PR PBB SHADOW E&M-EST. PATIENT-LVL IV: CPT | Mod: PBBFAC,,, | Performed by: INTERNAL MEDICINE

## 2025-07-09 PROCEDURE — 3066F NEPHROPATHY DOC TX: CPT | Mod: CPTII,,, | Performed by: INTERNAL MEDICINE

## 2025-07-09 PROCEDURE — 3061F NEG MICROALBUMINURIA REV: CPT | Mod: CPTII,,, | Performed by: INTERNAL MEDICINE

## 2025-07-09 PROCEDURE — 3044F HG A1C LEVEL LT 7.0%: CPT | Mod: CPTII,,, | Performed by: INTERNAL MEDICINE

## 2025-07-09 PROCEDURE — 3008F BODY MASS INDEX DOCD: CPT | Mod: CPTII,,, | Performed by: INTERNAL MEDICINE

## 2025-07-09 PROCEDURE — 1159F MED LIST DOCD IN RCRD: CPT | Mod: CPTII,,, | Performed by: INTERNAL MEDICINE

## 2025-07-09 PROCEDURE — 3080F DIAST BP >= 90 MM HG: CPT | Mod: CPTII,,, | Performed by: INTERNAL MEDICINE

## 2025-07-09 PROCEDURE — 99214 OFFICE O/P EST MOD 30 MIN: CPT | Mod: S$PBB,,, | Performed by: INTERNAL MEDICINE

## 2025-07-09 PROCEDURE — 99214 OFFICE O/P EST MOD 30 MIN: CPT | Mod: PBBFAC | Performed by: INTERNAL MEDICINE

## 2025-07-09 PROCEDURE — 3075F SYST BP GE 130 - 139MM HG: CPT | Mod: CPTII,,, | Performed by: INTERNAL MEDICINE

## 2025-07-09 NOTE — PROGRESS NOTES
Hepatology Follow Up Note    Referring provider: No ref. provider found  PCP: Trevor Biggs MD    Chief complaint: follow up liver lesion     Last seen in clinic on: 4/9/2025    Brief HPI:  Josselyn Tarango is a 59 y.o. female with fatty liver, liver hemangioma, prior heavy alcohol use, obesity, prediabetes, HLD, who presents for scheduled follow up.    Interval Events:  - Feels well. Denies acute complaints.  - This is the extent of the patient's complaints at this time.    Past Medical History:   Diagnosis Date    Allergy     Bilateral flank pain 01/24/2025    Diabetes mellitus     diet control    Digestive disorder     Hx: gastric ulcer    Kidney stone     Urinary tract infection        Past Surgical History:   Procedure Laterality Date    COLONOSCOPY, SCREENING, HIGH RISK PATIENT N/A 2/6/2025    Procedure: COLONOSCOPY, SCREENING, HIGH RISK PATIENT;  Surgeon: Pino Cuellar MD;  Location: [address];  Service: Endoscopy;  Laterality: N/A;  ref by Ryder Nichols MD, peg, portal-ae  Jm/change MD from Wallace to /adjust to fellow  case lengths  1/30 pre call complete/mleone    CYSTOSCOPY W/ URETERAL STENT PLACEMENT Left 6/7/2024    Procedure: CYSTOSCOPY, left retrograde pyelogram, left JJ stent;  Surgeon: Humza Saenz MD;  Location: [facility];  Service: Urology;  Laterality: Left;  MAC vs choice    CYSTOURETEROSCOPY, WITH HOLMIUM LASER LITHOTRIPSY OF URETERAL CALCULUS AND STENT INSERTION Left 6/17/2024    Procedure: Cystoscopy, left retrograde pyelogram, left ureteroscopy with holmium laser lithotripsy, stone basket extraction, left JJ stent;  Surgeon: Humza Saenz MD;  Location: [facility];  Service: Urology;  Laterality: Left;  Element    CYSTOURETEROSCOPY, WITH HOLMIUM LASER LITHOTRIPSY OF URETERAL CALCULUS AND STENT INSERTION Right 10/14/2024    Procedure: CYSTOURETEROSCOPY, WITH HOLMIUM LASER LITHOTRIPSY OF URETERAL CALCULUS AND STENT INSERTION;  Surgeon: Humza Saenz  "MD JOE;  Location: [facility] OR;  Service: Urology;  Laterality: Right;  LMA    EXTRACTION - STONE Left 6/17/2024    Procedure: EXTRACTION - STONE;  Surgeon: Humza Saenz MD;  Location: [facility] OR;  Service: Urology;  Laterality: Left;    REPAIR, WOUND, USING FLAP, AFTER PILONIDAL CYST PROCEDURE N/A 5/28/2025    Procedure: REPAIR,WOUND,USING FLAP,AFTER PILONIDAL CYST PROCEDURE;  Surgeon: KAYLEE Lawler MD;  Location: [facility] OR 2ND FLR;  Service: Colon and Rectal;  Laterality: N/A;    RETROGRADE PYELOGRAPHY N/A 6/7/2024    Procedure: PYELOGRAM, RETROGRADE;  Surgeon: Humza Saenz MD;  Location: [facility] OR;  Service: Urology;  Laterality: N/A;    RETROGRADE PYELOGRAPHY N/A 6/17/2024    Procedure: PYELOGRAM, RETROGRADE;  Surgeon: Humza Saenz MD;  Location: [facility] OR;  Service: Urology;  Laterality: N/A;    RETROGRADE PYELOGRAPHY  10/14/2024    Procedure: PYELOGRAM, RETROGRADE;  Surgeon: Humza Saenz MD;  Location: [facility] OR;  Service: Urology;;    SURGICAL REMOVAL OF PILONIDAL CYST N/A 5/28/2025    Procedure: EXCISION, PILONIDAL CYST;  Surgeon: KAYLEE Lawler MD;  Location: [facility] OR 2ND FLR;  Service: Colon and Rectal;  Laterality: N/A;       No family history on file.    Social History[1]    Current Medications[2]    Review of patient's allergies indicates:   Allergen Reactions    Bee sting [allergen ext-venom-honey bee] Anaphylaxis    Sulfa (sulfonamide antibiotics) Anaphylaxis            Vitals:    07/09/25 1511   BP: (!) 132/94   Pulse: (!) 116   Resp: 18   Temp: 97 °F (36.1 °C)   TempSrc: Oral   SpO2: 98%   Weight: 87.6 kg (193 lb 2 oz)   Height: 5' 4" (1.626 m)   Body mass index is 33.15 kg/m².    Physical Exam  Constitutional:       General: She is not in acute distress.     Appearance: She is not toxic-appearing.   Eyes:      General: No scleral icterus.  Cardiovascular:      Rate and Rhythm: Normal rate.   Pulmonary:      Effort: Pulmonary effort is normal.   Abdominal:      " "General: Abdomen is flat. There is no distension.   Skin:     Coloration: Skin is not jaundiced.   Neurological:      General: No focal deficit present.      Mental Status: She is alert.   Psychiatric:         Behavior: Behavior normal.         Thought Content: Thought content normal.         LABS: I personally reviewed pertinent laboratory findings.    Lab Results   Component Value Date    WBC 15.15 (H) 06/11/2025    HGB 12.6 06/11/2025    HCT 37.5 06/11/2025    MCV 89 06/11/2025     06/11/2025       Lab Results   Component Value Date     06/11/2025    K 3.7 06/11/2025     (H) 06/11/2025    CO2 19 (L) 06/11/2025    BUN 12 06/11/2025    CREATININE 1.0 06/11/2025    CALCIUM 8.5 (L) 06/11/2025    ANIONGAP 9 06/11/2025       Lab Results   Component Value Date    ALT 10 06/11/2025    AST 10 (L) 06/11/2025    ALKPHOS 101 06/11/2025    BILITOT 0.3 06/11/2025       Lab Results   Component Value Date    HEPBCAB Non-Reactive 04/09/2025    HEPCAB Non-reactive 12/09/2024       No results found for: "MILLIE", "MITOAB", "SMOOTHMUSCAB", "IGG", "CERULOPLSM"     Computed MELD 3.0 unavailable. One or more values for this score either were not found within the given timeframe or did not fit some other criterion.  Computed MELD-Na unavailable. One or more values for this score either were not found within the given timeframe or did not fit some other criterion.       IMAGING: I personally reviewed imaging studies.      Assessment: Josselyn Tarango is a 59 y.o. female with fatty liver, liver hemangioma, prior heavy alcohol use, obesity, prediabetes, HLD, who presents for scheduled follow up. Per chart review, liver enzymes and bilirubin have been within normal limits dating back to June 2024 in our system. CT abd with contrast (2/13/25) showed the liver is mildly enlarged, indeterminate lesion at the right hepatic lobe measuring 1.8 x 1.4 cm, spleen is normal in size.     Follow up MRI abd w/wo contrast (4/21/25) " demonstrates 1.8 cm benign flash filling right hepatic lobe hemangioma, normal liver size and contour. FibroScan (4/9/25) without hepatic fibrosis or steatosis.    Discussed the benign nature of hepatic hemangiomas.  No further evaluation or surveillance is warranted.    1. Liver hemangioma    2. Abnormal finding on imaging of liver    3. History of alcohol use      Recommendations:  - No further evaluation/surveillance warranted for hemangioma  - Weight loss (goal 5-10% body weight)  - Mediterranean diet   - Avoid alcohol    Return to clinic as needed.    Christel Coleman MD  Staff Physician  Hepatology and Liver Transplant  [facility]  [facility]           [1]   Social History  Tobacco Use    Smoking status: Every Day     Current packs/day: 1.00     Average packs/day: 1 pack/day for 45.5 years (45.5 ttl pk-yrs)     Types: Cigarettes     Start date: 1980     Passive exposure: Current    Smokeless tobacco: Never    Tobacco comments:     Smoking cessation education note: Pt is a 1 pk/day cigarette smoker x 45 yrs. 21 mg nicotine patch ordered Q day. Pt states not ready to quit. Handout provided.    Substance Use Topics    Alcohol use: Not Currently    Drug use: Never   [2]   Current Outpatient Medications   Medication Sig Dispense Refill    amitriptyline (ELAVIL) 25 MG tablet Take 1 tablet (25 mg total) by mouth every evening. 30 tablet 5    aspirin 325 MG tablet Take 650 mg by mouth.      aspirin-caffeine 845-65 mg PwPk Take 845 mg by mouth 3 (three) times daily as needed.      atorvastatin (LIPITOR) 40 MG tablet Take 0.5 tablets (20 mg total) by mouth once daily. 45 tablet 3    butalbital-acetaminophen-caffeine -40 mg (FIORICET, ESGIC) -40 mg per tablet Take 1 tablet by mouth every 6 (six) hours as needed for Pain or Headaches. 6 tablet 0    estradioL (ESTRACE) 0.01 % (0.1 mg/gram) vaginal cream Place 1 g vaginally once daily. 42.5 g 3    incontinence  pad, liner, disp Pads 1 Bag by Misc.(Non-Drug; Combo Route) route every 14 (fourteen) days. 40 each 11    mirabegron (MYRBETRIQ) 50 mg Tb24 Take 1 tablet (50 mg total) by mouth once daily. 30 tablet 11    nystatin (MYCOSTATIN) powder Apply topically 2 (two) times daily. 15 g 0    ondansetron (ZOFRAN-ODT) 4 MG TbDL Take 1 tablet (4 mg total) by mouth every 6 (six) hours as needed (Nausea). 10 tablet 0    oxyCODONE (ROXICODONE) 5 MG immediate release tablet Take 1 tablet (5 mg total) by mouth every 6 (six) hours as needed for Pain. 20 tablet 0    sumatriptan (IMITREX) 50 MG tablet Take 1 tablet (50 mg total) by mouth 2 (two) times daily as needed for Migraine. 30 tablet 0     No current facility-administered medications for this visit.

## 2025-07-15 ENCOUNTER — OFFICE VISIT (OUTPATIENT)
Dept: UROGYNECOLOGY | Facility: CLINIC | Age: 60
End: 2025-07-15
Payer: MEDICAID

## 2025-07-15 VITALS
DIASTOLIC BLOOD PRESSURE: 73 MMHG | HEIGHT: 64 IN | BODY MASS INDEX: 33.12 KG/M2 | HEART RATE: 92 BPM | SYSTOLIC BLOOD PRESSURE: 132 MMHG | WEIGHT: 194 LBS

## 2025-07-15 DIAGNOSIS — R15.1 FECAL SMEARING: ICD-10-CM

## 2025-07-15 DIAGNOSIS — N28.1 RENAL CYST: ICD-10-CM

## 2025-07-15 DIAGNOSIS — N39.0 FREQUENT UTI: ICD-10-CM

## 2025-07-15 DIAGNOSIS — R35.0 URINARY FREQUENCY: Primary | ICD-10-CM

## 2025-07-15 LAB
BILIRUB SERPL-MCNC: NORMAL MG/DL
BLOOD URINE, POC: NORMAL
CLARITY, POC UA: NORMAL
COLOR, POC UA: YELLOW
GLUCOSE UR QL STRIP: NORMAL
KETONES UR QL STRIP: NORMAL
LEUKOCYTE ESTERASE URINE, POC: NORMAL
NITRITE, POC UA: NORMAL
PH, POC UA: 5
PROTEIN, POC: 30
SPECIFIC GRAVITY, POC UA: 1.02
UROBILINOGEN, POC UA: NORMAL

## 2025-07-15 PROCEDURE — 99999PBSHW POCT URINE DIPSTICK WITHOUT MICROSCOPE: Mod: PBBFAC,,,

## 2025-07-15 PROCEDURE — 99214 OFFICE O/P EST MOD 30 MIN: CPT | Mod: PBBFAC | Performed by: NURSE PRACTITIONER

## 2025-07-15 PROCEDURE — 87086 URINE CULTURE/COLONY COUNT: CPT | Performed by: NURSE PRACTITIONER

## 2025-07-15 PROCEDURE — 81002 URINALYSIS NONAUTO W/O SCOPE: CPT | Mod: PBBFAC | Performed by: NURSE PRACTITIONER

## 2025-07-15 PROCEDURE — 51701 INSERT BLADDER CATHETER: CPT | Mod: PBBFAC | Performed by: NURSE PRACTITIONER

## 2025-07-15 PROCEDURE — 99999 PR PBB SHADOW E&M-EST. PATIENT-LVL IV: CPT | Mod: PBBFAC,,, | Performed by: NURSE PRACTITIONER

## 2025-07-15 NOTE — PATIENT INSTRUCTIONS
1)  Frequent UTIs (bladder infections):  --urine C&S today --will treat prolonged  --If you feel like you have a UTI:                --During the work week: call PCP or go to nearest [facility]              --After hours or on weekends: go to nearest [facility]                          --These facilities can check your urine for infection, send a urine culture to verify presence/absence of infection, and start treatment if needed.               --After you are evaluated, please send a message through [facility] portal or call your urogynecology provider's office to let them know so that they can follow trends.  --follow UTI prevention tips (see attached)  --work on emptying bladder well:  --empty bladder every 2-3 hours.  Empty well: wait a minute, lean forward on toilet.    --prolapse present: stage 1 cystocele/rectocele              --PVR today: 40  --control bowel movements/fecal cross-contamination  --treat vaginal atrophy (dryness)  --empty bladder before and after intercourse  --consider taking daily combination pill: cranberry + D-mannose (9084-3910 mg) + probiotic               --look on [employer] or in drug/grocery store for any brand that has these components              --examples of brands: Biophix, Now, AZO  --consider need for further evaluation ( tract imaging, cystoscopy) if issue persists  --last  tract imagin2024 retroperitoneal ultrasound Benign right renal cyst 4.8 cm.   Possible small cyst at the midpole of the left kidney containing minimal debris within versus focal dilated calyx.    --last cystoscopy: 10/2024 with stone retrieval   --will repeat retroperitoneal ultrasound     2)vaginal dryness  --continue vaginal estrogen cream  --use dime size amount in vagina-- insert to your second knuckle and rub around just inside vaginal opening nightly x 2 weeks then twice weekly     3) stage 1 cystocele, stage 1 rectocele  --continue to monitor     4)constipation  -- Start daily  fiber.  Take 1 tsp of fiber powder (psyllium or other sugar-free powder).  Mix in 8 oz of water.  Take x 3-5 days.  Then, increase fiber by 1 tsp every 3-5 days until stool is easy to pass.  Stop and continue at that dose.   Do not exceed 6 tsps/day.  May also use over the counter stool softener 1-2 x/day.  AVOID laxatives.  Or   Start 1 fiber gummyl/day x 3 days.  Then 2 gummies/day x 3 days.  Then 3 gummies/day x 3 days...increasing in this fashion to max 6 gummies a day.  STOP when you find dose that makes stool easy to pass (this may be 1 gummy a day or may be 4 gummies/day).  Continue at this dose FOREVER.  AVOID laxatives     5)Mixed urinary incontinence, urge > stress:    --Empty bladder every 3 hours.  Empty well: wait a minute, lean forward on toilet.    --Avoid dietary irritants (see sheet).  Keep diary x 3-5 days to determine your irritants.  --KEGELS: do 10 in AM and 10 in PM, holding each x 10 seconds.  When you feel urge to go, STOP, KEGEL, and when urge has passed, then go to bathroom.  Consider PT in future.    --URGE:continue myrbetriq 50 mg daily.Takes 2-4 weeks to see if will have effect.  For dry mouth: get sour, sugar free lozenge or gum.    --STRESS:  Pessary vs. Sling.      6)pilonidal cyst  --resolved     7)? Vulvar cyst  --call if L labia swells again-- need to see if it has a connection with frequent uti     8)RTC 3 months for follow up

## 2025-07-15 NOTE — PROGRESS NOTES
Urogyn follow up  07/15/2025  .  [facility] - UROGYNECOLOGY  [address] [id number]    Josselyn Tarango  [id number]  1965      Josselyn Tarango is a 59 y.o. here for a urogyn follow up for frequent uti.    Last HPI from 04/01/2025  1)  UI:  (+) JORGE < (+) UUI  X 1years.  (+) pads:2-4/day, usually moderate wetness.  Daytime frequency: Q 1-2 hours during the day.  Nocturia: Yes: 4/night.   (+) dysuria with initial and during micturiation,  (--) hematuria,  (+) frequent UTIs.  (--) complete bladder emptying.      2)  POP:  Present. above introitus.  Symptoms:(--)  .  (+) vaginal bleeding. (--) vaginal discharge. (--) sexually active.   (--)  Vaginal dryness.  (--) vaginal estrogen use.    POP-Q:  Aa -2; Ba -2; C -7; Ap -2; Bp -2; D n/a.  Genital hiatus 5, perineal body 2 total vaginal length 8.   Pvr 40 mL    3)  BM:  (--) constipation/straining.  (--) chronic diarrhea. (--) hematochezia.  (--) fecal incontinence.  (--) fecal smearing/urgency.  (--) complete evacuation.  Feels like stool gets trapped in vagina     4)kidney stones     01/2025  Renal stone ct  No acute abnormalities.  No obstructive uropathy.   Evaluation of solid organ and vascular pathology is limited due to lack of IV contrast.   All CT scans at this facility use dose modulation, iterative reconstruction, and/or weight based dosing when appropriate to reduce radiation dose to as low as reasonable achievable.       Changes from last visit:  1)  Frequent UTIs (bladder infections):     Urine Culture   1/24/2025 Multiple organisms isolated. None in predominance.  Repeat if     clinically necessary.    2/5/2025 COAGULASE-NEGATIVE STAPHYLOCOCCUS SPECIES !    3/7/2025 ESCHERICHIA COLI !    4/1/2025 >100,000 cfu/ml Escherichia coli !    5/16/2025 50,000 - 99,999 cfu/ml Escherichia coli !    6/12/2025 >100,000 cfu/ml Escherichia coli !       Using estrogen cream  Eating yogurt     2)vaginal dryness  --using vaginal estrogen  cream     3) stage 1 cystocele, stage 1 rectocele  --not bothered by prolapse    4)constipation  --soft formed stools  --did not start fiber     5)Mixed urinary incontinence, urge > stress:    --not sure if she is taking myrbetriq     6)pilonidal cyst  --resected     7)? Vulvar cyst  --labia has not swollen again    Past Medical History:   Diagnosis Date    Allergy     Bilateral flank pain 01/24/2025    Diabetes mellitus     diet control    Digestive disorder     Hx: gastric ulcer    Kidney stone     Urinary tract infection        Past Surgical History:   Procedure Laterality Date    COLONOSCOPY, SCREENING, HIGH RISK PATIENT N/A 2/6/2025    Procedure: COLONOSCOPY, SCREENING, HIGH RISK PATIENT;  Surgeon: Pino Cuellar MD;  Location: [facility] ([address] [place] [address]);  Service: Endoscopy;  Laterality: N/A;  ref by Ryder Nichols MD, peg, portal-ae  Jm/change MD from Rodrigo to /adjust to fellow  case lengths  1/30 pre call complete/mleone    CYSTOSCOPY W/ URETERAL STENT PLACEMENT Left 6/7/2024    Procedure: CYSTOSCOPY, left retrograde pyelogram, left JJ stent;  Surgeon: Humza Saenz MD;  Location: [facility];  Service: Urology;  Laterality: Left;  MAC vs choice    CYSTOURETEROSCOPY, WITH HOLMIUM LASER LITHOTRIPSY OF URETERAL CALCULUS AND STENT INSERTION Left 6/17/2024    Procedure: Cystoscopy, left retrograde pyelogram, left ureteroscopy with holmium laser lithotripsy, stone basket extraction, left JJ stent;  Surgeon: Humza Saenz MD;  Location: [facility];  Service: Urology;  Laterality: Left;  Element    CYSTOURETEROSCOPY, WITH HOLMIUM LASER LITHOTRIPSY OF URETERAL CALCULUS AND STENT INSERTION Right 10/14/2024    Procedure: CYSTOURETEROSCOPY, WITH HOLMIUM LASER LITHOTRIPSY OF URETERAL CALCULUS AND STENT INSERTION;  Surgeon: Humza Saenz MD;  Location: [facility];  Service: Urology;  Laterality: Right;  LMA    EXTRACTION - STONE Left 6/17/2024    Procedure: EXTRACTION - STONE;  Surgeon:  Humza Saenz MD;  Location: [facility] OR;  Service: Urology;  Laterality: Left;    REPAIR, WOUND, USING FLAP, AFTER PILONIDAL CYST PROCEDURE N/A 5/28/2025    Procedure: REPAIR,WOUND,USING FLAP,AFTER PILONIDAL CYST PROCEDURE;  Surgeon: KAYLEE Lawler MD;  Location: [facility] OR 2ND FLR;  Service: Colon and Rectal;  Laterality: N/A;    RETROGRADE PYELOGRAPHY N/A 6/7/2024    Procedure: PYELOGRAM, RETROGRADE;  Surgeon: Humza Saenz MD;  Location: [facility] OR;  Service: Urology;  Laterality: N/A;    RETROGRADE PYELOGRAPHY N/A 6/17/2024    Procedure: PYELOGRAM, RETROGRADE;  Surgeon: Humza Saenz MD;  Location: [facility] OR;  Service: Urology;  Laterality: N/A;    RETROGRADE PYELOGRAPHY  10/14/2024    Procedure: PYELOGRAM, RETROGRADE;  Surgeon: Humza Saenz MD;  Location: [facility] OR;  Service: Urology;;    SURGICAL REMOVAL OF PILONIDAL CYST N/A 5/28/2025    Procedure: EXCISION, PILONIDAL CYST;  Surgeon: KAYLEE Lawler MD;  Location: [facility] OR 2ND FLR;  Service: Colon and Rectal;  Laterality: N/A;       No family history on file.    Social History     Socioeconomic History    Marital status: Single   Tobacco Use    Smoking status: Every Day     Current packs/day: 1.00     Average packs/day: 1 pack/day for 45.5 years (45.5 ttl pk-yrs)     Types: Cigarettes     Start date: 1980     Passive exposure: Current    Smokeless tobacco: Never    Tobacco comments:     Smoking cessation education note: Pt is a 1 pk/day cigarette smoker x 45 yrs. 21 mg nicotine patch ordered Q day. Pt states not ready to quit. Handout provided.    Substance and Sexual Activity    Alcohol use: Not Currently    Drug use: Never    Sexual activity: Not Currently     Social Drivers of Health     Financial Resource Strain: Medium Risk (6/19/2025)    Overall Financial Resource Strain (CARDIA)     Difficulty of Paying Living Expenses: Somewhat hard   Food Insecurity: Food Insecurity Present (6/19/2025)    Hunger Vital Sign     Worried  "About Running Out of Food in the Last Year: Sometimes true     Ran Out of Food in the Last Year: Sometimes true   Transportation Needs: No Transportation Needs (6/19/2025)    PRAPARE - Transportation     Lack of Transportation (Medical): No     Lack of Transportation (Non-Medical): No   Physical Activity: Insufficiently Active (3/3/2025)    Exercise Vital Sign     Days of Exercise per Week: 1 day     Minutes of Exercise per Session: 30 min   Stress: Stress Concern Present (3/3/2025)    [employer] [place] [employer] - Occupational Stress Questionnaire     Feeling of Stress : To some extent   Housing Stability: High Risk (6/19/2025)    Housing Stability Vital Sign     Unable to Pay for Housing in the Last Year: Yes     Number of Times Moved in the Last Year: 1     Homeless in the Last Year: No       Current Medications[1]    Review of patient's allergies indicates:   Allergen Reactions    Bee sting [allergen ext-venom-honey bee] Anaphylaxis    Sulfa (sulfonamide antibiotics) Anaphylaxis       Well woman:  Pap test: post hysterectomy.  History of abnormal paps: Yes - one-- had cryo  History of STIs:  No  Mammogram: Date of last: 12/2024.  Result: Normal  Colonoscopy: Date of last: 02/2025.  Result:  diverticulosis, polyps, internal hemorrhoids.  Repeat due:  3 years.    DEXA:  n/a    ROS:  As per HPI.      Exam  /73 (BP Location: Right arm, Patient Position: Sitting)   Pulse 92   Ht 5' 4" (1.626 m)   Wt 88 kg (194 lb 0.1 oz)   BMI 33.30 kg/m²   General: alert and oriented, no acute distress  Respiratory: normal respiratory effort  Abd: soft, non-tender, non-distended    Pelvic  Ext. Genitalia: normal external genitalia. Normal bartholin's and skeens glands  Vagina: + atrophy. Normal vaginal mucosa without lesions. No discharge noted.   Non-tender bladder base without palpable mass.  Cervix: absent  Uterus:  absent   Urethra: no masses or tenderness  Urethral meatus: no lesions, caruncle or " prolapse.  Cath urine specimen    Impression  1. Urinary frequency  POCT URINE DIPSTICK WITHOUT MICROSCOPE      2. Frequent UTI  psyllium (HYDROCIL) packet    US Retroperitoneal Complete    Urine Culture High Risk ($$)      3. Fecal smearing  psyllium (HYDROCIL) packet      4. Renal cyst  US Retroperitoneal Complete        We reviewed the above issues and discussed options for short-term versus long-term management of her problems.   Plan:   1)  Frequent UTIs (bladder infections):  --urine C&S today --will treat prolonged  --If you feel like you have a UTI:                --During the work week: call PCP or go to nearest [facility]              --After hours or on weekends: go to nearest [facility]                          --These facilities can check your urine for infection, send a urine culture to verify presence/absence of infection, and start treatment if needed.               --After you are evaluated, please send a message through [facility] portal or call your urogynecology provider's office to let them know so that they can follow trends.  --follow UTI prevention tips (see attached)  --work on emptying bladder well:  --empty bladder every 2-3 hours.  Empty well: wait a minute, lean forward on toilet.    --prolapse present: stage 1 cystocele/rectocele              --PVR today: 40  --control bowel movements/fecal cross-contamination  --treat vaginal atrophy (dryness)  --empty bladder before and after intercourse  --consider taking daily combination pill: cranberry + D-mannose (5740-3597 mg) + probiotic               --look on Amazon or in drug/grocery store for any brand that has these components              --examples of brands: Biophix, Now, AZO  --consider need for further evaluation ( tract imaging, cystoscopy) if issue persists  --last  tract imagin2024 retroperitoneal ultrasound Benign right renal cyst 4.8 cm.   Possible small cyst at the midpole of the left kidney containing  minimal debris within versus focal dilated calyx.    --last cystoscopy: 10/2024 with stone retrieval   --will repeat retroperitoneal ultrasound     2)vaginal dryness  --continue vaginal estrogen cream  --use dime size amount in vagina-- insert to your second knuckle and rub around just inside vaginal opening nightly x 2 weeks then twice weekly     3) stage 1 cystocele, stage 1 rectocele  --continue to monitor     4)constipation  -- Start daily fiber.  Take 1 tsp of fiber powder (psyllium or other sugar-free powder).  Mix in 8 oz of water.  Take x 3-5 days.  Then, increase fiber by 1 tsp every 3-5 days until stool is easy to pass.  Stop and continue at that dose.   Do not exceed 6 tsps/day.  May also use over the counter stool softener 1-2 x/day.  AVOID laxatives.  Or   Start 1 fiber gummyl/day x 3 days.  Then 2 gummies/day x 3 days.  Then 3 gummies/day x 3 days...increasing in this fashion to max 6 gummies a day.  STOP when you find dose that makes stool easy to pass (this may be 1 gummy a day or may be 4 gummies/day).  Continue at this dose FOREVER.  AVOID laxatives     5)Mixed urinary incontinence, urge > stress:    --Empty bladder every 3 hours.  Empty well: wait a minute, lean forward on toilet.    --Avoid dietary irritants (see sheet).  Keep diary x 3-5 days to determine your irritants.  --KEGELS: do 10 in AM and 10 in PM, holding each x 10 seconds.  When you feel urge to go, STOP, KEGEL, and when urge has passed, then go to bathroom.  Consider PT in future.    --URGE:continue myrbetriq 50 mg daily.Takes 2-4 weeks to see if will have effect.  For dry mouth: get sour, sugar free lozenge or gum.    --STRESS:  Pessary vs. Sling.      6)pilonidal cyst  --resolved     7)? Vulvar cyst  --call if L labia swells again-- need to see if it has a connection with frequent uti     8)RTC 3 months for follow up    I spent a total of 30 minutes on the day of the visit.  This includes face to face time and non-face to face time  preparing to see the patient (eg, review of tests), obtaining and/or reviewing separately obtained history, documenting clinical information in the electronic or other health record, independently interpreting results and communicating results to the patient/family/caregiver, or care coordinator.     I spent a total of 30 minutes on the day of the visit.  This includes face to face time and non-face to face time preparing to see the patient (eg, review of tests), obtaining and/or reviewing separately obtained history, documenting clinical information in the electronic or other health record, independently interpreting results and communicating results to the patient/family/caregiver, or care coordinator.       Niesha Spence, [facility]  Division of Female Pelvic Medicine and Reconstructive Surgery  Department of Obstetrics & Gynecology         [1]   Current Outpatient Medications   Medication Sig Dispense Refill    amitriptyline (ELAVIL) 25 MG tablet Take 1 tablet (25 mg total) by mouth every evening. 30 tablet 5    aspirin 325 MG tablet Take 650 mg by mouth.      aspirin-caffeine 845-65 mg PwPk Take 845 mg by mouth 3 (three) times daily as needed.      atorvastatin (LIPITOR) 40 MG tablet Take 0.5 tablets (20 mg total) by mouth once daily. 45 tablet 3    butalbital-acetaminophen-caffeine -40 mg (FIORICET, ESGIC) -40 mg per tablet Take 1 tablet by mouth every 6 (six) hours as needed for Pain or Headaches. 6 tablet 0    estradioL (ESTRACE) 0.01 % (0.1 mg/gram) vaginal cream Place 1 g vaginally once daily. 42.5 g 3    incontinence pad, liner, disp Pads 1 Bag by Misc.(Non-Drug; Combo Route) route every 14 (fourteen) days. 40 each 11    mirabegron (MYRBETRIQ) 50 mg Tb24 Take 1 tablet (50 mg total) by mouth once daily. 30 tablet 11    nystatin (MYCOSTATIN) powder Apply topically 2 (two) times daily. 15 g 0    ondansetron (ZOFRAN-ODT) 4 MG TbDL Take 1 tablet (4 mg total) by mouth every 6  (six) hours as needed (Nausea). 10 tablet 0    oxyCODONE (ROXICODONE) 5 MG immediate release tablet Take 1 tablet (5 mg total) by mouth every 6 (six) hours as needed for Pain. 20 tablet 0    sumatriptan (IMITREX) 50 MG tablet Take 1 tablet (50 mg total) by mouth 2 (two) times daily as needed for Migraine. 30 tablet 0    psyllium (HYDROCIL) packet Take 1 packet by mouth once daily. 30 packet 12     No current facility-administered medications for this visit.

## 2025-07-17 LAB — BACTERIA UR CULT: ABNORMAL

## 2025-07-18 ENCOUNTER — PATIENT MESSAGE (OUTPATIENT)
Dept: UROGYNECOLOGY | Facility: CLINIC | Age: 60
End: 2025-07-18
Payer: MEDICAID

## 2025-07-18 DIAGNOSIS — N30.00 ACUTE CYSTITIS WITHOUT HEMATURIA: Primary | ICD-10-CM

## 2025-07-18 RX ORDER — CEPHALEXIN 500 MG/1
500 CAPSULE ORAL 2 TIMES DAILY
Qty: 14 CAPSULE | Refills: 0 | Status: SHIPPED | OUTPATIENT
Start: 2025-07-18 | End: 2025-07-25

## 2025-07-18 NOTE — TELEPHONE ENCOUNTER
Informed pt her urine culture was positive for UTI and NP ELIDA Spence sent a Rx for Keflex to her pharmacy. Pt voiced understanding and stated she is still waiting to receive the  mirabegron (MYRBETRIQ) 50 mg Tb24 . Informed pt I've sent a message to Deanna who handles the PA, once completed she'll be notified.

## 2025-08-01 ENCOUNTER — HOSPITAL ENCOUNTER (EMERGENCY)
Facility: HOSPITAL | Age: 60
Discharge: HOME | End: 2025-08-01
Attending: EMERGENCY MEDICINE
Payer: MEDICAID

## 2025-08-01 VITALS
BODY MASS INDEX: 32.44 KG/M2 | HEART RATE: 76 BPM | TEMPERATURE: 98 F | HEIGHT: 64 IN | SYSTOLIC BLOOD PRESSURE: 110 MMHG | DIASTOLIC BLOOD PRESSURE: 67 MMHG | OXYGEN SATURATION: 98 % | RESPIRATION RATE: 18 BRPM | WEIGHT: 190 LBS

## 2025-08-01 DIAGNOSIS — N12 PYELONEPHRITIS OF LEFT KIDNEY: Primary | ICD-10-CM

## 2025-08-01 LAB
ABSOLUTE EOSINOPHIL (OHS): 0.34 K/UL
ABSOLUTE MONOCYTE (OHS): 0.45 K/UL (ref 0.3–1)
ABSOLUTE NEUTROPHIL COUNT (OHS): 5.88 K/UL (ref 1.8–7.7)
ALBUMIN SERPL BCP-MCNC: 3.9 G/DL (ref 3.5–5.2)
ALP SERPL-CCNC: 124 UNIT/L (ref 40–150)
ALT SERPL W/O P-5'-P-CCNC: <8 UNIT/L (ref 10–44)
ANION GAP (OHS): 11 MMOL/L (ref 8–16)
AST SERPL-CCNC: 21 UNIT/L (ref 11–45)
BACTERIA #/AREA URNS AUTO: ABNORMAL /HPF
BASOPHILS # BLD AUTO: 0.07 K/UL
BASOPHILS NFR BLD AUTO: 0.7 %
BILIRUB SERPL-MCNC: 0.4 MG/DL (ref 0.1–1)
BILIRUB UR QL STRIP.AUTO: NEGATIVE
BUN SERPL-MCNC: 10 MG/DL (ref 6–20)
CALCIUM SERPL-MCNC: 9.4 MG/DL (ref 8.7–10.5)
CHLORIDE SERPL-SCNC: 109 MMOL/L (ref 95–110)
CLARITY UR: ABNORMAL
CO2 SERPL-SCNC: 21 MMOL/L (ref 23–29)
COLOR UR AUTO: YELLOW
CREAT SERPL-MCNC: 1 MG/DL (ref 0.5–1.4)
ERYTHROCYTE [DISTWIDTH] IN BLOOD BY AUTOMATED COUNT: 13.6 % (ref 11.5–14.5)
GFR SERPLBLD CREATININE-BSD FMLA CKD-EPI: >60 ML/MIN/1.73/M2
GLUCOSE SERPL-MCNC: 87 MG/DL (ref 70–110)
GLUCOSE UR QL STRIP: NEGATIVE
HCT VFR BLD AUTO: 43.6 % (ref 37–48.5)
HGB BLD-MCNC: 14.5 GM/DL (ref 12–16)
HGB UR QL STRIP: ABNORMAL
IMM GRANULOCYTES # BLD AUTO: 0.02 K/UL (ref 0–0.04)
IMM GRANULOCYTES NFR BLD AUTO: 0.2 % (ref 0–0.5)
KETONES UR QL STRIP: NEGATIVE
LEUKOCYTE ESTERASE UR QL STRIP: ABNORMAL
LIPASE SERPL-CCNC: 48 U/L (ref 4–60)
LYMPHOCYTES # BLD AUTO: 2.92 K/UL (ref 1–4.8)
MAGNESIUM SERPL-MCNC: 2.1 MG/DL (ref 1.6–2.6)
MCH RBC QN AUTO: 29.3 PG (ref 27–31)
MCHC RBC AUTO-ENTMCNC: 33.3 G/DL (ref 32–36)
MCV RBC AUTO: 88 FL (ref 82–98)
MICROSCOPIC COMMENT: ABNORMAL
NITRITE UR QL STRIP: NEGATIVE
NUCLEATED RBC (/100WBC) (OHS): 0 /100 WBC
PH UR STRIP: 6 [PH]
PLATELET # BLD AUTO: 275 K/UL (ref 150–450)
PLATELET BLD QL SMEAR: NORMAL
PMV BLD AUTO: 10.2 FL (ref 9.2–12.9)
POTASSIUM SERPL-SCNC: 4.2 MMOL/L (ref 3.5–5.1)
PROT SERPL-MCNC: 7.7 GM/DL (ref 6–8.4)
PROT UR QL STRIP: NEGATIVE
RBC # BLD AUTO: 4.95 M/UL (ref 4–5.4)
RBC #/AREA URNS AUTO: 1 /HPF (ref 0–4)
RELATIVE EOSINOPHIL (OHS): 3.5 %
RELATIVE LYMPHOCYTE (OHS): 30.2 % (ref 18–48)
RELATIVE MONOCYTE (OHS): 4.6 % (ref 4–15)
RELATIVE NEUTROPHIL (OHS): 60.8 % (ref 38–73)
SODIUM SERPL-SCNC: 141 MMOL/L (ref 136–145)
SP GR UR STRIP: 1
UROBILINOGEN UR STRIP-ACNC: NEGATIVE EU/DL
WBC # BLD AUTO: 9.68 K/UL (ref 3.9–12.7)
WBC #/AREA URNS AUTO: >100 /HPF (ref 0–5)
WBC CLUMPS UR QL AUTO: ABNORMAL

## 2025-08-01 PROCEDURE — 63600175 PHARM REV CODE 636 W HCPCS: Performed by: EMERGENCY MEDICINE

## 2025-08-01 PROCEDURE — 81003 URINALYSIS AUTO W/O SCOPE: CPT | Performed by: EMERGENCY MEDICINE

## 2025-08-01 PROCEDURE — 87077 CULTURE AEROBIC IDENTIFY: CPT | Performed by: EMERGENCY MEDICINE

## 2025-08-01 PROCEDURE — 25000003 PHARM REV CODE 250: Performed by: EMERGENCY MEDICINE

## 2025-08-01 PROCEDURE — 99284 EMERGENCY DEPT VISIT MOD MDM: CPT | Mod: 25

## 2025-08-01 PROCEDURE — 85025 COMPLETE CBC W/AUTO DIFF WBC: CPT | Performed by: EMERGENCY MEDICINE

## 2025-08-01 PROCEDURE — 80053 COMPREHEN METABOLIC PANEL: CPT | Performed by: EMERGENCY MEDICINE

## 2025-08-01 PROCEDURE — 96374 THER/PROPH/DIAG INJ IV PUSH: CPT

## 2025-08-01 PROCEDURE — 96375 TX/PRO/DX INJ NEW DRUG ADDON: CPT

## 2025-08-01 PROCEDURE — 83735 ASSAY OF MAGNESIUM: CPT | Performed by: EMERGENCY MEDICINE

## 2025-08-01 PROCEDURE — 83690 ASSAY OF LIPASE: CPT | Performed by: EMERGENCY MEDICINE

## 2025-08-01 RX ORDER — ONDANSETRON HYDROCHLORIDE 2 MG/ML
4 INJECTION, SOLUTION INTRAVENOUS
Status: COMPLETED | OUTPATIENT
Start: 2025-08-01 | End: 2025-08-01

## 2025-08-01 RX ORDER — NAPROXEN 500 MG/1
500 TABLET ORAL 2 TIMES DAILY WITH MEALS
Qty: 28 TABLET | Refills: 0 | Status: SHIPPED | OUTPATIENT
Start: 2025-08-01 | End: 2025-08-15

## 2025-08-01 RX ORDER — CEFTRIAXONE 1 G/1
1 INJECTION, POWDER, FOR SOLUTION INTRAMUSCULAR; INTRAVENOUS
Status: COMPLETED | OUTPATIENT
Start: 2025-08-01 | End: 2025-08-01

## 2025-08-01 RX ORDER — ACETAMINOPHEN 500 MG
1000 TABLET ORAL
Status: COMPLETED | OUTPATIENT
Start: 2025-08-01 | End: 2025-08-01

## 2025-08-01 RX ORDER — CEFDINIR 300 MG/1
300 CAPSULE ORAL 2 TIMES DAILY
Qty: 20 CAPSULE | Refills: 0 | Status: SHIPPED | OUTPATIENT
Start: 2025-08-02 | End: 2025-08-12

## 2025-08-01 RX ORDER — DOCUSATE SODIUM 50 MG/5ML
50 LIQUID ORAL 2 TIMES DAILY PRN
Qty: 10 ML | Refills: 0 | Status: SHIPPED | OUTPATIENT
Start: 2025-08-01

## 2025-08-01 RX ADMIN — CEFTRIAXONE SODIUM 1 G: 1 INJECTION, POWDER, FOR SOLUTION INTRAMUSCULAR; INTRAVENOUS at 04:08

## 2025-08-01 RX ADMIN — ACETAMINOPHEN 1000 MG: 500 TABLET ORAL at 03:08

## 2025-08-01 RX ADMIN — ONDANSETRON 4 MG: 2 INJECTION INTRAMUSCULAR; INTRAVENOUS at 03:08

## 2025-08-01 NOTE — ED PROVIDER NOTES
Encounter Date: 8/1/2025       History     Chief Complaint   Patient presents with    Abdominal Pain     Pt presents from home via EMS, called out for lower abd pain that radiates to upper left flank, history of kidney stones. +N -V/D, afebrile, given 15 mg toradol IV en route. +dysuria, states she recently finished abx for UTI     59-year-old female past medical history as below brought in by EMS for evaluation of abdominal pain.  Patient says that she completed a course of Keflex a few days ago and after that had onset of nausea.  She complains of ongoing chronic lower abdominal pain throughout her lower abdomen and left flank and unresolved dysuria. She says the pain is relatively constant.  She complains of urinary urgency.  She denies hematuria, fever, chills, change in vaginal discharge or concern for STDs. Notes that she has not received insurance approval to start the prescribed mirabegron for her symptoms. EMS reports she received 15 IV Toradol en route to the ED. Pt also notes that she feels a fullness in her right ear.     The history is provided by the patient and the EMS personnel.     Review of patient's allergies indicates:   Allergen Reactions    Bee sting [allergen ext-venom-honey bee] Anaphylaxis    Sulfa (sulfonamide antibiotics) Anaphylaxis     Past Medical History:   Diagnosis Date    Allergy     Bilateral flank pain 01/24/2025    Diabetes mellitus     diet control    Digestive disorder     Hx: gastric ulcer    Kidney stone     Urinary tract infection      Past Surgical History:   Procedure Laterality Date    COLONOSCOPY, SCREENING, HIGH RISK PATIENT N/A 2/6/2025    Procedure: COLONOSCOPY, SCREENING, HIGH RISK PATIENT;  Surgeon: Pino Cuellar MD;  Location: [address]);  Service: Endoscopy;  Laterality: N/A;  ref by Ryder Nichols MD, peg, portal-ae  Jm/change MD from Rodrigo to /adjust to fellow  case lengths  1/30 pre call complete/mleone    CYSTOSCOPY W/ URETERAL  STENT PLACEMENT Left 6/7/2024    Procedure: CYSTOSCOPY, left retrograde pyelogram, left JJ stent;  Surgeon: Humza Saenz MD;  Location: [facility] OR;  Service: Urology;  Laterality: Left;  MAC vs choice    CYSTOURETEROSCOPY, WITH HOLMIUM LASER LITHOTRIPSY OF URETERAL CALCULUS AND STENT INSERTION Left 6/17/2024    Procedure: Cystoscopy, left retrograde pyelogram, left ureteroscopy with holmium laser lithotripsy, stone basket extraction, left JJ stent;  Surgeon: Humza Saenz MD;  Location: [facility] OR;  Service: Urology;  Laterality: Left;  Element    CYSTOURETEROSCOPY, WITH HOLMIUM LASER LITHOTRIPSY OF URETERAL CALCULUS AND STENT INSERTION Right 10/14/2024    Procedure: CYSTOURETEROSCOPY, WITH HOLMIUM LASER LITHOTRIPSY OF URETERAL CALCULUS AND STENT INSERTION;  Surgeon: Humza Saenz MD;  Location: [facility] OR;  Service: Urology;  Laterality: Right;  LMA    EXTRACTION - STONE Left 6/17/2024    Procedure: EXTRACTION - STONE;  Surgeon: Humza Saenz MD;  Location: [facility];  Service: Urology;  Laterality: Left;    REPAIR, WOUND, USING FLAP, AFTER PILONIDAL CYST PROCEDURE N/A 5/28/2025    Procedure: REPAIR,WOUND,USING FLAP,AFTER PILONIDAL CYST PROCEDURE;  Surgeon: KAYLEE Lawler MD;  Location: [address];  Service: Colon and Rectal;  Laterality: N/A;    RETROGRADE PYELOGRAPHY N/A 6/7/2024    Procedure: PYELOGRAM, RETROGRADE;  Surgeon: Humza Saenz MD;  Location: [facility];  Service: Urology;  Laterality: N/A;    RETROGRADE PYELOGRAPHY N/A 6/17/2024    Procedure: PYELOGRAM, RETROGRADE;  Surgeon: Humza Saenz MD;  Location: [facility];  Service: Urology;  Laterality: N/A;    RETROGRADE PYELOGRAPHY  10/14/2024    Procedure: PYELOGRAM, RETROGRADE;  Surgeon: Humza Saenz MD;  Location: [facility];  Service: Urology;;    SURGICAL REMOVAL OF PILONIDAL CYST N/A 5/28/2025    Procedure: EXCISION, PILONIDAL CYST;  Surgeon: KAYLEE Lawler MD;  Location: [facility] OR [address] [place] [address];  Service:  Colon and Rectal;  Laterality: N/A;     No family history on file.  Social History[1]  Review of Systems    Physical Exam     Initial Vitals [08/01/25 1505]   BP Pulse Resp Temp SpO2   131/78 90 18 98.4 °F (36.9 °C) 99 %      MAP       --         Physical Exam    Nursing note and vitals reviewed.  Constitutional: She appears well-developed. She is not diaphoretic. No distress.   HENT:   Head: Normocephalic and atraumatic.   No mastoid tenderness bilaterally. R TM partially occluded with dry cerumen. Normal L TM.   Eyes: EOM are normal. Pupils are equal, round, and reactive to light.   Neck:   Normal range of motion.  Cardiovascular:  Normal rate.           Pulmonary/Chest: No respiratory distress.   Abdominal: Abdomen is soft. She exhibits no distension. There is abdominal tenderness (mild diffuse, greater in suprapubic area and left flank).   Musculoskeletal:         General: Normal range of motion.      Cervical back: Normal range of motion.     Neurological: She is alert and oriented to person, place, and time.   Skin: Skin is warm and dry.   Psychiatric: She has a normal mood and affect.         ED Course   Procedures  Labs Reviewed   COMPREHENSIVE METABOLIC PANEL - Abnormal       Result Value    Sodium 141      Potassium 4.2      Chloride 109      CO2 21 (*)     Glucose 87      BUN 10      Creatinine 1.0      Calcium 9.4      Protein Total 7.7      Albumin 3.9      Bilirubin Total 0.4            AST 21      ALT <8 (*)     Anion Gap 11      eGFR >60     URINALYSIS, REFLEX TO URINE CULTURE - Abnormal    Color, UA Yellow      Appearance, UA Hazy (*)     pH, UA 6.0      Spec Grav UA 1.005      Protein, UA Negative      Glucose, UA Negative      Ketones, UA Negative      Bilirubin, UA Negative      Blood, UA Trace (*)     Nitrites, UA Negative      Urobilinogen, UA Negative      Leukocyte Esterase, UA 3+ (*)    URINALYSIS MICROSCOPIC - Abnormal    RBC, UA 1      WBC, UA >100 (*)     WBC Clumps, UA Few (*)      Bacteria, UA Moderate (*)     Microscopic Comment       MAGNESIUM - Normal    Magnesium  2.1     LIPASE - Normal    Lipase Level 48     CBC WITH DIFFERENTIAL - Normal    WBC 9.68      RBC 4.95      HGB 14.5      HCT 43.6      MCV 88      MCH 29.3      MCHC 33.3      RDW 13.6      Platelet Count 275      MPV 10.2      Nucleated RBC 0      Neut % 60.8      Lymph % 30.2      Mono % 4.6      Eos % 3.5      Basophil % 0.7      Imm Grans % 0.2      Neut # 5.88      Lymph # 2.92      Mono # 0.45      Eos # 0.34      Baso # 0.07      Imm Grans # 0.02     PLATELET REVIEW - Normal    Platelet Estimate Appears Normal     CULTURE, URINE   CBC W/ AUTO DIFFERENTIAL    Narrative:     The following orders were created for panel order CBC auto differential.  Procedure                               Abnormality         Status                     ---------                               -----------         ------                     CBC with Differential[[id number]]       Normal              Final result                 Please view results for these tests on the individual orders.   [place] URINE HOLD          Imaging Results    None          Medications   acetaminophen tablet 1,000 mg (1,000 mg Oral Given 8/1/25 1535)   ondansetron injection 4 mg (4 mg Intravenous Given 8/1/25 1536)   cefTRIAXone injection 1 g (1 g Intravenous Given 8/1/25 1647)     Medical Decision Making  60 yo F w Pmhx as above bib EMS with complaint of abdominal pain. Ddx includes but not limited to UTI, pyelonephritis, nephrolithiasis, PID, JUAN ULIS. No acute lab abnormalities. Urine infected, presentation c/w pyelonephritis. Patient with no peritoneal signs, clinical picture does not suggest need for CT at this time and the risk of radiation is felt to be greater than the benefit of the test at this point. Recent urine micro reviewed, prescribed Omnicef. Given dose of ceftriaxone in the ED since she says it will be difficult for her to get her prescription filled  this evening due to transport issues. Also given therapy for partial cerumen impaction of right ear. Discharged with strict return precautions and outpatient f/u.     No acute emergent medical condition has been identified. The patient appears to be low risk for an emergent medical condition is appropriate for discharge with outpatient f/u as detailed in discharge instructions for reevaluation and possible continued outpatient workup and management. I have discussed the workup with the patient, who has verbalized understanding of the plan and need for outpatient follow-up.  This evaluation does not preclude the development of an emergent condition so in addition, I have advised the patient that they can return to the ED at any time with worsening or change of their symptoms, or with any other medical complaint.       Amount and/or Complexity of Data Reviewed  Independent Historian: EMS  External Data Reviewed: notes.     Details: Seen 7/15/25 for urinary frequency  Labs: ordered. Decision-making details documented in ED Course.    Risk  OTC drugs.  Prescription drug management.  Diagnosis or treatment significantly limited by social determinants of health.               ED Course as of 08/01/25 1755   Fri Aug 01, 2025   1602 WBC: 9.68  Normal  [AT]   1623 Creatinine: 1.0  Normal  [AT]   1623 Urinalysis Microscopic(!)  C/w infection [AT]      ED Course User Index  [AT] Antonia Alex MD                               Clinical Impression:  Final diagnoses:  [N12] Pyelonephritis of left kidney (Primary)          ED Disposition Condition    Discharge Stable          ED Prescriptions       Medication Sig Dispense Start Date End Date Auth. Provider    cefdinir (OMNICEF) 300 MG capsule Take 1 capsule (300 mg total) by mouth 2 (two) times daily. for 10 days 20 capsule 8/2/2025 8/12/2025 Antonia Alex MD    naproxen (NAPROSYN) 500 MG tablet Take 1 tablet (500 mg total) by mouth 2 (two) times daily with meals.  for 14 days 28 tablet 8/1/2025 8/15/2025 Antonia Alex MD    carbamide peroxide (DEBROX) 6.5 % otic solution Place 5 drops into the right ear as needed. 15 mL 8/1/2025 -- Antonia Alex MD    docusate (COLACE) 50 mg/5 mL liquid Take 5 mLs (50 mg total) by mouth 2 (two) times daily as needed (ear discomfort). Place 2 drops in right ear 10 mL 8/1/2025 -- Antonia Alex MD          Follow-up Information       Follow up With Specialties Details Why Contact Info    , MD [facility] Schedule an appointment as soon as possible for a visit in 2 days  [address] [id number] [phone]      Niesha Spence, NP UroGynecology  Schedule an appointment as soon as possible for a visit in 2 days  [address] [facility] [address]  [phone]      [facility] Emergency [facility] Emergency Medicine  As needed, If symptoms worsen [address] [facility] [address] [phone]                   [1]   Social History  Tobacco Use    Smoking status: Every Day     Current packs/day: 1.00     Average packs/day: 1 pack/day for 45.6 years (45.6 ttl pk-yrs)     Types: Cigarettes     Start date: 1980     Passive exposure: Current    Smokeless tobacco: Never    Tobacco comments:     Smoking cessation education note: Pt is a 1 pk/day cigarette smoker x 45 yrs. 21 mg nicotine patch ordered Q day. Pt states not ready to quit. Handout provided.    Substance Use Topics    Alcohol use: Not Currently    Drug use: Never        Antonia Alex MD  08/01/25 [id number]

## 2025-08-01 NOTE — DISCHARGE INSTRUCTIONS

## 2025-08-01 NOTE — ED NOTES
Brought in by [facility] EMS from home. Pt reports lower abdominal (bladder) and LUQ abdominal pain. Pt states recent diagnosis of UTI with treatment with Keflex. Finished antibiotics 2 days ago. Pt states recurrent UTI and bladder/kidney issues for several months. Pt currently under treatment with urology/gynecology. Pt states LUQ pain constant, lower abdominal/bladder pain intermittent. Pt also states nausea and burning with urination. Pt reports dribbling and unable to hold urine, but has urge to urinate.

## 2025-08-05 ENCOUNTER — HOSPITAL ENCOUNTER (OUTPATIENT)
Dept: RADIOLOGY | Facility: HOSPITAL | Age: 60
Discharge: HOME | End: 2025-08-05
Attending: NURSE PRACTITIONER
Payer: MEDICAID

## 2025-08-05 DIAGNOSIS — N28.1 RENAL CYST: ICD-10-CM

## 2025-08-05 DIAGNOSIS — N39.0 FREQUENT UTI: ICD-10-CM

## 2025-08-05 LAB — BACTERIA UR CULT: ABNORMAL

## 2025-08-05 PROCEDURE — 76770 US EXAM ABDO BACK WALL COMP: CPT | Mod: 26,,, | Performed by: RADIOLOGY

## 2025-08-05 PROCEDURE — 76770 US EXAM ABDO BACK WALL COMP: CPT | Mod: TC

## 2025-08-08 ENCOUNTER — PROCEDURE VISIT (OUTPATIENT)
Dept: UROGYNECOLOGY | Facility: CLINIC | Age: 60
End: 2025-08-08
Payer: MEDICAID

## 2025-08-08 VITALS
HEART RATE: 96 BPM | HEIGHT: 64 IN | SYSTOLIC BLOOD PRESSURE: 143 MMHG | BODY MASS INDEX: 32.37 KG/M2 | WEIGHT: 189.63 LBS | DIASTOLIC BLOOD PRESSURE: 81 MMHG

## 2025-08-08 DIAGNOSIS — N39.41 URGE INCONTINENCE: ICD-10-CM

## 2025-08-08 DIAGNOSIS — N30.00 ACUTE CYSTITIS WITHOUT HEMATURIA: Primary | ICD-10-CM

## 2025-08-08 PROCEDURE — 99499 UNLISTED E&M SERVICE: CPT | Mod: S$PBB,,, | Performed by: NURSE PRACTITIONER

## 2025-08-08 RX ORDER — CEFDINIR 300 MG/1
300 CAPSULE ORAL DAILY
Qty: 30 CAPSULE | Refills: 0 | Status: SHIPPED | OUTPATIENT
Start: 2025-08-08 | End: 2025-09-07

## 2025-08-08 RX ORDER — MIRABEGRON 50 MG/1
50 TABLET, FILM COATED, EXTENDED RELEASE ORAL DAILY
Qty: 30 TABLET | Refills: 11 | Status: SHIPPED | OUTPATIENT
Start: 2025-08-08 | End: 2026-08-08

## 2025-08-28 ENCOUNTER — PROCEDURE VISIT (OUTPATIENT)
Dept: UROGYNECOLOGY | Facility: CLINIC | Age: 60
End: 2025-08-28
Payer: MEDICAID

## 2025-08-28 VITALS — SYSTOLIC BLOOD PRESSURE: 152 MMHG | HEART RATE: 79 BPM | DIASTOLIC BLOOD PRESSURE: 79 MMHG

## 2025-08-28 DIAGNOSIS — N39.0 FREQUENT UTI: ICD-10-CM

## 2025-08-28 DIAGNOSIS — N39.41 URGE INCONTINENCE: Primary | ICD-10-CM

## 2025-08-28 PROCEDURE — 87086 URINE CULTURE/COLONY COUNT: CPT | Performed by: NURSE PRACTITIONER

## 2025-08-28 PROCEDURE — 99499 UNLISTED E&M SERVICE: CPT | Mod: 25,S$PBB,, | Performed by: NURSE PRACTITIONER

## 2025-08-29 LAB — BACTERIA UR CULT: ABNORMAL

## 2025-09-05 ENCOUNTER — TELEPHONE (OUTPATIENT)
Dept: UROGYNECOLOGY | Facility: CLINIC | Age: 60
End: 2025-09-05
Payer: MEDICAID

## 2025-09-05 DIAGNOSIS — N30.00 ACUTE CYSTITIS WITHOUT HEMATURIA: Primary | ICD-10-CM

## 2025-09-05 RX ORDER — CIPROFLOXACIN 250 MG/1
500 TABLET, FILM COATED ORAL 2 TIMES DAILY
Qty: 14 TABLET | Refills: 0 | Status: SHIPPED | OUTPATIENT
Start: 2025-09-05 | End: 2025-09-12

## (undated) DEVICE — GUIDE WIRE MOTION .035 X 150CM

## (undated) DEVICE — CATH URTRL OPEN END STR TIP 5F

## (undated) DEVICE — BASKET STONE RETRV 1.9FRX120CM

## (undated) DEVICE — GLOVE BIOGEL SKINSENSE PI 8.0

## (undated) DEVICE — TIP YANKAUERS BULB NO VENT

## (undated) DEVICE — SET IRR URLGY 2LINE UNIV SPIKE

## (undated) DEVICE — SPONGE COTTON TRAY 4X4IN

## (undated) DEVICE — CONTAINER MULTIPURPOSE/SPECIME

## (undated) DEVICE — JELLY LUBRICANT STERILE 4 OZ

## (undated) DEVICE — SEAL UROLOGY

## (undated) DEVICE — DRAPE T CYSTOSCOPY STERILE

## (undated) DEVICE — ELECTRODE REM PLYHSV RETURN 9

## (undated) DEVICE — SEE MEDLINE ITEM 154981

## (undated) DEVICE — SYR ONLY LUER LOCK 20CC

## (undated) DEVICE — BAG LINGEMAN DRAIN UROLOGY

## (undated) DEVICE — PENCIL ROCKER SWITCH 10FT CORD

## (undated) DEVICE — GOWN POLY REINF BRTH SLV XL

## (undated) DEVICE — LUBRICANT SURGILUBE 2 OZ

## (undated) DEVICE — DRAPE LAP T SHT W/ INSTR PAD

## (undated) DEVICE — PANTIES FEMININE NAPKIN LG/XLG

## (undated) DEVICE — CATH POLLACK OPEN-END FLEXI-TI

## (undated) DEVICE — COVER TABLE HVY DTY 60X90IN

## (undated) DEVICE — ELECTRODE MEGADYNE RETURN DUAL

## (undated) DEVICE — TOWEL OR XRAY BLUE 17X26IN

## (undated) DEVICE — TOWEL OR DISP STRL BLUE 4/PK

## (undated) DEVICE — SYR 10CC LUER LOCK

## (undated) DEVICE — BAG PRESSURE INFUSER 3000CC

## (undated) DEVICE — BOWL STERILE LARGE 32OZ

## (undated) DEVICE — TRAY SKIN SCRUB WET PREMIUM

## (undated) DEVICE — TAPE SILK 3IN

## (undated) DEVICE — FIBER MOSES 200 DFL

## (undated) DEVICE — CATH URETERAL DUAL LUMEN 10FR

## (undated) DEVICE — TRAY MINOR GEN SURG OMC

## (undated) DEVICE — SUPPORT ULNA NERVE PROTECTOR

## (undated) DEVICE — URETERO-RENOSCOPE FLEX-X POS

## (undated) DEVICE — SOL IRR NACL .9% 3000ML

## (undated) DEVICE — GUIDEWIRE NITINOL HYBRID 150CM